# Patient Record
Sex: MALE | Race: BLACK OR AFRICAN AMERICAN | NOT HISPANIC OR LATINO
[De-identification: names, ages, dates, MRNs, and addresses within clinical notes are randomized per-mention and may not be internally consistent; named-entity substitution may affect disease eponyms.]

---

## 2022-01-01 ENCOUNTER — NON-APPOINTMENT (OUTPATIENT)
Age: 72
End: 2022-01-01

## 2022-01-01 ENCOUNTER — TRANSCRIPTION ENCOUNTER (OUTPATIENT)
Age: 72
End: 2022-01-01

## 2022-01-01 ENCOUNTER — APPOINTMENT (OUTPATIENT)
Dept: HEMATOLOGY ONCOLOGY | Facility: CLINIC | Age: 72
End: 2022-01-01

## 2022-01-01 ENCOUNTER — INPATIENT (INPATIENT)
Facility: HOSPITAL | Age: 72
LOS: 1 days | Discharge: HOME | DRG: 812 | End: 2022-11-30
Attending: INTERNAL MEDICINE | Admitting: HOSPITALIST
Payer: COMMERCIAL

## 2022-01-01 ENCOUNTER — RESULT REVIEW (OUTPATIENT)
Age: 72
End: 2022-01-01

## 2022-01-01 ENCOUNTER — APPOINTMENT (OUTPATIENT)
Dept: INFUSION THERAPY | Facility: HOSPITAL | Age: 72
End: 2022-01-01

## 2022-01-01 ENCOUNTER — APPOINTMENT (OUTPATIENT)
Dept: HEMATOLOGY ONCOLOGY | Facility: CLINIC | Age: 72
End: 2022-01-01
Payer: MEDICARE

## 2022-01-01 ENCOUNTER — OUTPATIENT (OUTPATIENT)
Dept: OUTPATIENT SERVICES | Facility: HOSPITAL | Age: 72
LOS: 1 days | End: 2022-01-01
Payer: COMMERCIAL

## 2022-01-01 ENCOUNTER — OUTPATIENT (OUTPATIENT)
Dept: OUTPATIENT SERVICES | Facility: HOSPITAL | Age: 72
LOS: 1 days | Discharge: HOME | End: 2022-01-01

## 2022-01-01 ENCOUNTER — INPATIENT (INPATIENT)
Facility: HOSPITAL | Age: 72
LOS: 5 days | Discharge: HOME | DRG: 846 | End: 2022-12-22
Attending: INTERNAL MEDICINE | Admitting: INTERNAL MEDICINE
Payer: COMMERCIAL

## 2022-01-01 VITALS
OXYGEN SATURATION: 100 % | DIASTOLIC BLOOD PRESSURE: 68 MMHG | SYSTOLIC BLOOD PRESSURE: 124 MMHG | WEIGHT: 181.22 LBS | BODY MASS INDEX: 27.15 KG/M2 | RESPIRATION RATE: 16 BRPM | HEART RATE: 112 BPM | TEMPERATURE: 97.7 F | HEIGHT: 68.5 IN

## 2022-01-01 VITALS
TEMPERATURE: 99 F | RESPIRATION RATE: 17 BRPM | DIASTOLIC BLOOD PRESSURE: 73 MMHG | HEIGHT: 68.11 IN | HEART RATE: 99 BPM | OXYGEN SATURATION: 100 % | SYSTOLIC BLOOD PRESSURE: 121 MMHG | WEIGHT: 181.44 LBS

## 2022-01-01 VITALS
OXYGEN SATURATION: 98 % | TEMPERATURE: 99 F | DIASTOLIC BLOOD PRESSURE: 65 MMHG | SYSTOLIC BLOOD PRESSURE: 102 MMHG | RESPIRATION RATE: 16 BRPM | HEART RATE: 83 BPM

## 2022-01-01 VITALS
OXYGEN SATURATION: 95 % | HEART RATE: 102 BPM | TEMPERATURE: 98.7 F | SYSTOLIC BLOOD PRESSURE: 131 MMHG | DIASTOLIC BLOOD PRESSURE: 84 MMHG

## 2022-01-01 VITALS
SYSTOLIC BLOOD PRESSURE: 118 MMHG | HEART RATE: 92 BPM | TEMPERATURE: 98.3 F | OXYGEN SATURATION: 98 % | RESPIRATION RATE: 18 BRPM | DIASTOLIC BLOOD PRESSURE: 81 MMHG

## 2022-01-01 VITALS
SYSTOLIC BLOOD PRESSURE: 129 MMHG | TEMPERATURE: 98 F | DIASTOLIC BLOOD PRESSURE: 80 MMHG | RESPIRATION RATE: 18 BRPM | HEART RATE: 78 BPM | OXYGEN SATURATION: 99 %

## 2022-01-01 VITALS
DIASTOLIC BLOOD PRESSURE: 82 MMHG | HEART RATE: 90 BPM | OXYGEN SATURATION: 99 % | HEIGHT: 70 IN | TEMPERATURE: 98 F | SYSTOLIC BLOOD PRESSURE: 131 MMHG | WEIGHT: 186.95 LBS | RESPIRATION RATE: 20 BRPM

## 2022-01-01 VITALS
TEMPERATURE: 98.4 F | SYSTOLIC BLOOD PRESSURE: 126 MMHG | HEART RATE: 90 BPM | RESPIRATION RATE: 18 BRPM | DIASTOLIC BLOOD PRESSURE: 81 MMHG | OXYGEN SATURATION: 98 %

## 2022-01-01 DIAGNOSIS — Z98.890 OTHER SPECIFIED POSTPROCEDURAL STATES: Chronic | ICD-10-CM

## 2022-01-01 DIAGNOSIS — E78.5 HYPERLIPIDEMIA, UNSPECIFIED: ICD-10-CM

## 2022-01-01 DIAGNOSIS — D46.9 MYELODYSPLASTIC SYNDROME, UNSPECIFIED: ICD-10-CM

## 2022-01-01 DIAGNOSIS — D61.818 OTHER PANCYTOPENIA: ICD-10-CM

## 2022-01-01 DIAGNOSIS — Z78.9 OTHER SPECIFIED HEALTH STATUS: ICD-10-CM

## 2022-01-01 DIAGNOSIS — Z29.9 ENCOUNTER FOR PROPHYLACTIC MEASURES, UNSPECIFIED: ICD-10-CM

## 2022-01-01 DIAGNOSIS — E11.9 TYPE 2 DIABETES MELLITUS WITHOUT COMPLICATIONS: ICD-10-CM

## 2022-01-01 DIAGNOSIS — R09.89 OTHER SPECIFIED SYMPTOMS AND SIGNS INVOLVING THE CIRCULATORY AND RESPIRATORY SYSTEMS: ICD-10-CM

## 2022-01-01 DIAGNOSIS — Z86.39 PERSONAL HISTORY OF OTHER ENDOCRINE, NUTRITIONAL AND METABOLIC DISEASE: ICD-10-CM

## 2022-01-01 DIAGNOSIS — I10 ESSENTIAL (PRIMARY) HYPERTENSION: ICD-10-CM

## 2022-01-01 DIAGNOSIS — D64.9 ANEMIA, UNSPECIFIED: ICD-10-CM

## 2022-01-01 DIAGNOSIS — R17 UNSPECIFIED JAUNDICE: ICD-10-CM

## 2022-01-01 DIAGNOSIS — Z51.89 ENCOUNTER FOR OTHER SPECIFIED AFTERCARE: ICD-10-CM

## 2022-01-01 DIAGNOSIS — Z82.49 FAMILY HISTORY OF ISCHEMIC HEART DISEASE AND OTHER DISEASES OF THE CIRCULATORY SYSTEM: ICD-10-CM

## 2022-01-01 DIAGNOSIS — R06.09 OTHER FORMS OF DYSPNEA: ICD-10-CM

## 2022-01-01 DIAGNOSIS — B99.9 UNSPECIFIED INFECTIOUS DISEASE: ICD-10-CM

## 2022-01-01 DIAGNOSIS — R11.2 NAUSEA WITH VOMITING, UNSPECIFIED: ICD-10-CM

## 2022-01-01 LAB
A1C WITH ESTIMATED AVERAGE GLUCOSE RESULT: 7.4 % — HIGH (ref 4–5.6)
ALBUMIN SERPL ELPH-MCNC: 3.5 G/DL — SIGNIFICANT CHANGE UP (ref 3.3–5)
ALBUMIN SERPL ELPH-MCNC: 3.7 G/DL — SIGNIFICANT CHANGE UP (ref 3.3–5)
ALBUMIN SERPL ELPH-MCNC: 3.9 G/DL — SIGNIFICANT CHANGE UP (ref 3.3–5)
ALBUMIN SERPL ELPH-MCNC: 4 G/DL — SIGNIFICANT CHANGE UP (ref 3.3–5)
ALBUMIN SERPL ELPH-MCNC: 4.1 G/DL — SIGNIFICANT CHANGE UP (ref 3.3–5)
ALBUMIN SERPL ELPH-MCNC: 4.2 G/DL — SIGNIFICANT CHANGE UP (ref 3.3–5)
ALBUMIN SERPL ELPH-MCNC: 4.3 G/DL
ALBUMIN SERPL ELPH-MCNC: 4.4 G/DL — SIGNIFICANT CHANGE UP (ref 3.3–5)
ALBUMIN SERPL ELPH-MCNC: 4.6 G/DL — SIGNIFICANT CHANGE UP (ref 3.3–5)
ALBUMIN SERPL ELPH-MCNC: 4.9 G/DL — SIGNIFICANT CHANGE UP (ref 3.3–5)
ALP BLD-CCNC: 82 U/L
ALP SERPL-CCNC: 104 U/L — SIGNIFICANT CHANGE UP (ref 40–120)
ALP SERPL-CCNC: 74 U/L — SIGNIFICANT CHANGE UP (ref 40–120)
ALP SERPL-CCNC: 74 U/L — SIGNIFICANT CHANGE UP (ref 40–120)
ALP SERPL-CCNC: 75 U/L — SIGNIFICANT CHANGE UP (ref 40–120)
ALP SERPL-CCNC: 75 U/L — SIGNIFICANT CHANGE UP (ref 40–120)
ALP SERPL-CCNC: 76 U/L — SIGNIFICANT CHANGE UP (ref 40–120)
ALP SERPL-CCNC: 80 U/L — SIGNIFICANT CHANGE UP (ref 40–120)
ALP SERPL-CCNC: 80 U/L — SIGNIFICANT CHANGE UP (ref 40–120)
ALP SERPL-CCNC: 86 U/L — SIGNIFICANT CHANGE UP (ref 40–120)
ALP SERPL-CCNC: 90 U/L — SIGNIFICANT CHANGE UP (ref 40–120)
ALP SERPL-CCNC: 95 U/L — SIGNIFICANT CHANGE UP (ref 40–120)
ALT FLD-CCNC: 23 U/L — SIGNIFICANT CHANGE UP (ref 10–45)
ALT FLD-CCNC: 27 U/L — SIGNIFICANT CHANGE UP (ref 10–45)
ALT FLD-CCNC: 30 U/L — SIGNIFICANT CHANGE UP (ref 10–45)
ALT FLD-CCNC: 34 U/L — SIGNIFICANT CHANGE UP (ref 10–45)
ALT FLD-CCNC: 36 U/L — SIGNIFICANT CHANGE UP (ref 10–45)
ALT FLD-CCNC: 38 U/L — SIGNIFICANT CHANGE UP (ref 10–45)
ALT FLD-CCNC: 39 U/L — SIGNIFICANT CHANGE UP (ref 10–45)
ALT FLD-CCNC: 40 U/L — SIGNIFICANT CHANGE UP (ref 10–45)
ALT FLD-CCNC: 40 U/L — SIGNIFICANT CHANGE UP (ref 10–45)
ALT FLD-CCNC: 42 U/L — SIGNIFICANT CHANGE UP (ref 10–45)
ALT FLD-CCNC: 43 U/L — SIGNIFICANT CHANGE UP (ref 10–45)
ALT SERPL-CCNC: 32 U/L
ANION GAP SERPL CALC-SCNC: 10 MMOL/L — SIGNIFICANT CHANGE UP (ref 5–17)
ANION GAP SERPL CALC-SCNC: 11 MMOL/L — SIGNIFICANT CHANGE UP (ref 5–17)
ANION GAP SERPL CALC-SCNC: 11 MMOL/L — SIGNIFICANT CHANGE UP (ref 5–17)
ANION GAP SERPL CALC-SCNC: 12 MMOL/L — SIGNIFICANT CHANGE UP (ref 5–17)
ANION GAP SERPL CALC-SCNC: 14 MMOL/L
ANION GAP SERPL CALC-SCNC: 14 MMOL/L — SIGNIFICANT CHANGE UP (ref 5–17)
ANION GAP SERPL CALC-SCNC: 15 MMOL/L — SIGNIFICANT CHANGE UP (ref 5–17)
ANION GAP SERPL CALC-SCNC: 7 MMOL/L — SIGNIFICANT CHANGE UP (ref 5–17)
ANION GAP SERPL CALC-SCNC: 8 MMOL/L — SIGNIFICANT CHANGE UP (ref 5–17)
ANION GAP SERPL CALC-SCNC: 9 MMOL/L — SIGNIFICANT CHANGE UP (ref 5–17)
ANISOCYTOSIS BLD QL: SLIGHT — SIGNIFICANT CHANGE UP
APPEARANCE UR: CLEAR — SIGNIFICANT CHANGE UP
APTT BLD: 26.4 SEC — LOW (ref 27.5–35.5)
APTT BLD: 29.2 SEC — SIGNIFICANT CHANGE UP (ref 27.5–35.5)
AST SERPL-CCNC: 17 U/L — SIGNIFICANT CHANGE UP (ref 10–40)
AST SERPL-CCNC: 20 U/L — SIGNIFICANT CHANGE UP (ref 10–40)
AST SERPL-CCNC: 22 U/L — SIGNIFICANT CHANGE UP (ref 10–40)
AST SERPL-CCNC: 25 U/L — SIGNIFICANT CHANGE UP (ref 10–40)
AST SERPL-CCNC: 26 U/L — SIGNIFICANT CHANGE UP (ref 10–40)
AST SERPL-CCNC: 26 U/L — SIGNIFICANT CHANGE UP (ref 10–40)
AST SERPL-CCNC: 27 U/L
AST SERPL-CCNC: 27 U/L — SIGNIFICANT CHANGE UP (ref 10–40)
AST SERPL-CCNC: 27 U/L — SIGNIFICANT CHANGE UP (ref 10–40)
AST SERPL-CCNC: 29 U/L — SIGNIFICANT CHANGE UP (ref 10–40)
BACTERIA # UR AUTO: NEGATIVE — SIGNIFICANT CHANGE UP
BASOPHILS # BLD AUTO: 0 K/UL — SIGNIFICANT CHANGE UP (ref 0–0.2)
BASOPHILS # BLD AUTO: 0.01 K/UL — SIGNIFICANT CHANGE UP (ref 0–0.2)
BASOPHILS NFR BLD AUTO: 0 % — SIGNIFICANT CHANGE UP (ref 0–2)
BASOPHILS NFR BLD AUTO: 0.2 % — SIGNIFICANT CHANGE UP (ref 0–2)
BILIRUB DIRECT SERPL-MCNC: 0.3 MG/DL — SIGNIFICANT CHANGE UP (ref 0–0.3)
BILIRUB SERPL-MCNC: 0.6 MG/DL — SIGNIFICANT CHANGE UP (ref 0.2–1.2)
BILIRUB SERPL-MCNC: 0.8 MG/DL — SIGNIFICANT CHANGE UP (ref 0.2–1.2)
BILIRUB SERPL-MCNC: 0.8 MG/DL — SIGNIFICANT CHANGE UP (ref 0.2–1.2)
BILIRUB SERPL-MCNC: 0.9 MG/DL — SIGNIFICANT CHANGE UP (ref 0.2–1.2)
BILIRUB SERPL-MCNC: 1.1 MG/DL — SIGNIFICANT CHANGE UP (ref 0.2–1.2)
BILIRUB SERPL-MCNC: 1.1 MG/DL — SIGNIFICANT CHANGE UP (ref 0.2–1.2)
BILIRUB SERPL-MCNC: 1.2 MG/DL
BILIRUB SERPL-MCNC: 1.3 MG/DL — HIGH (ref 0.2–1.2)
BILIRUB SERPL-MCNC: 1.5 MG/DL — HIGH (ref 0.2–1.2)
BILIRUB UR-MCNC: NEGATIVE — SIGNIFICANT CHANGE UP
BLASTS # FLD: 1 % — HIGH (ref 0–0)
BLD GP AB SCN SERPL QL: NEGATIVE — SIGNIFICANT CHANGE UP
BLD GP AB SCN SERPL QL: POSITIVE — SIGNIFICANT CHANGE UP
BLD GP AB SCN SERPL QL: POSITIVE — SIGNIFICANT CHANGE UP
BUN SERPL-MCNC: 13 MG/DL — SIGNIFICANT CHANGE UP (ref 7–23)
BUN SERPL-MCNC: 13 MG/DL — SIGNIFICANT CHANGE UP (ref 7–23)
BUN SERPL-MCNC: 14 MG/DL — SIGNIFICANT CHANGE UP (ref 7–23)
BUN SERPL-MCNC: 15 MG/DL
BUN SERPL-MCNC: 15 MG/DL — SIGNIFICANT CHANGE UP (ref 7–23)
BUN SERPL-MCNC: 16 MG/DL — SIGNIFICANT CHANGE UP (ref 7–23)
BUN SERPL-MCNC: 17 MG/DL — SIGNIFICANT CHANGE UP (ref 7–23)
CALCIUM SERPL-MCNC: 10 MG/DL — SIGNIFICANT CHANGE UP (ref 8.4–10.5)
CALCIUM SERPL-MCNC: 9 MG/DL — SIGNIFICANT CHANGE UP (ref 8.4–10.5)
CALCIUM SERPL-MCNC: 9.2 MG/DL
CALCIUM SERPL-MCNC: 9.2 MG/DL — SIGNIFICANT CHANGE UP (ref 8.4–10.5)
CALCIUM SERPL-MCNC: 9.3 MG/DL — SIGNIFICANT CHANGE UP (ref 8.4–10.5)
CALCIUM SERPL-MCNC: 9.3 MG/DL — SIGNIFICANT CHANGE UP (ref 8.4–10.5)
CALCIUM SERPL-MCNC: 9.5 MG/DL — SIGNIFICANT CHANGE UP (ref 8.4–10.5)
CALCIUM SERPL-MCNC: 9.5 MG/DL — SIGNIFICANT CHANGE UP (ref 8.4–10.5)
CALCIUM SERPL-MCNC: 9.6 MG/DL — SIGNIFICANT CHANGE UP (ref 8.4–10.5)
CALCIUM SERPL-MCNC: 9.8 MG/DL — SIGNIFICANT CHANGE UP (ref 8.4–10.5)
CHLORIDE SERPL-SCNC: 100 MMOL/L — SIGNIFICANT CHANGE UP (ref 96–108)
CHLORIDE SERPL-SCNC: 101 MMOL/L — SIGNIFICANT CHANGE UP (ref 96–108)
CHLORIDE SERPL-SCNC: 103 MMOL/L — SIGNIFICANT CHANGE UP (ref 96–108)
CHLORIDE SERPL-SCNC: 104 MMOL/L — SIGNIFICANT CHANGE UP (ref 96–108)
CHLORIDE SERPL-SCNC: 105 MMOL/L — SIGNIFICANT CHANGE UP (ref 96–108)
CHLORIDE SERPL-SCNC: 105 MMOL/L — SIGNIFICANT CHANGE UP (ref 96–108)
CHLORIDE SERPL-SCNC: 98 MMOL/L
CHLORIDE SERPL-SCNC: 98 MMOL/L — SIGNIFICANT CHANGE UP (ref 96–108)
CHLORIDE SERPL-SCNC: 98 MMOL/L — SIGNIFICANT CHANGE UP (ref 96–108)
CHLORIDE SERPL-SCNC: 99 MMOL/L — SIGNIFICANT CHANGE UP (ref 96–108)
CHROM ANALY INTERPHASE BLD FISH-IMP: SIGNIFICANT CHANGE UP
CHROM ANALY OVERALL INTERP SPEC-IMP: SIGNIFICANT CHANGE UP
CO2 SERPL-SCNC: 24 MMOL/L — SIGNIFICANT CHANGE UP (ref 22–31)
CO2 SERPL-SCNC: 24 MMOL/L — SIGNIFICANT CHANGE UP (ref 22–31)
CO2 SERPL-SCNC: 25 MMOL/L — SIGNIFICANT CHANGE UP (ref 22–31)
CO2 SERPL-SCNC: 26 MMOL/L
CO2 SERPL-SCNC: 26 MMOL/L — SIGNIFICANT CHANGE UP (ref 22–31)
CO2 SERPL-SCNC: 26 MMOL/L — SIGNIFICANT CHANGE UP (ref 22–31)
CO2 SERPL-SCNC: 27 MMOL/L — SIGNIFICANT CHANGE UP (ref 22–31)
COLOR SPEC: YELLOW — SIGNIFICANT CHANGE UP
CREAT SERPL-MCNC: 0.94 MG/DL — SIGNIFICANT CHANGE UP (ref 0.5–1.3)
CREAT SERPL-MCNC: 0.97 MG/DL — SIGNIFICANT CHANGE UP (ref 0.5–1.3)
CREAT SERPL-MCNC: 1.05 MG/DL — SIGNIFICANT CHANGE UP (ref 0.5–1.3)
CREAT SERPL-MCNC: 1.08 MG/DL — SIGNIFICANT CHANGE UP (ref 0.5–1.3)
CREAT SERPL-MCNC: 1.09 MG/DL — SIGNIFICANT CHANGE UP (ref 0.5–1.3)
CREAT SERPL-MCNC: 1.13 MG/DL — SIGNIFICANT CHANGE UP (ref 0.5–1.3)
CREAT SERPL-MCNC: 1.15 MG/DL
CREAT SERPL-MCNC: 1.16 MG/DL — SIGNIFICANT CHANGE UP (ref 0.5–1.3)
CREAT SERPL-MCNC: 1.17 MG/DL — SIGNIFICANT CHANGE UP (ref 0.5–1.3)
CREAT SERPL-MCNC: 1.21 MG/DL — SIGNIFICANT CHANGE UP (ref 0.5–1.3)
CREAT SERPL-MCNC: 1.21 MG/DL — SIGNIFICANT CHANGE UP (ref 0.5–1.3)
CREAT SERPL-MCNC: 1.27 MG/DL — SIGNIFICANT CHANGE UP (ref 0.5–1.3)
DACRYOCYTES BLD QL SMEAR: SLIGHT — SIGNIFICANT CHANGE UP
DIFF PNL FLD: NEGATIVE — SIGNIFICANT CHANGE UP
EGFR: 60 ML/MIN/1.73M2 — SIGNIFICANT CHANGE UP
EGFR: 64 ML/MIN/1.73M2 — SIGNIFICANT CHANGE UP
EGFR: 64 ML/MIN/1.73M2 — SIGNIFICANT CHANGE UP
EGFR: 66 ML/MIN/1.73M2 — SIGNIFICANT CHANGE UP
EGFR: 67 ML/MIN/1.73M2 — SIGNIFICANT CHANGE UP
EGFR: 68 ML/MIN/1.73M2
EGFR: 69 ML/MIN/1.73M2 — SIGNIFICANT CHANGE UP
EGFR: 72 ML/MIN/1.73M2 — SIGNIFICANT CHANGE UP
EGFR: 73 ML/MIN/1.73M2 — SIGNIFICANT CHANGE UP
EGFR: 75 ML/MIN/1.73M2 — SIGNIFICANT CHANGE UP
EGFR: 83 ML/MIN/1.73M2 — SIGNIFICANT CHANGE UP
EGFR: 86 ML/MIN/1.73M2 — SIGNIFICANT CHANGE UP
ELLIPTOCYTES BLD QL SMEAR: SLIGHT — SIGNIFICANT CHANGE UP
EOSINOPHIL # BLD AUTO: 0 K/UL — SIGNIFICANT CHANGE UP (ref 0–0.5)
EOSINOPHIL # BLD AUTO: 0.01 K/UL — SIGNIFICANT CHANGE UP (ref 0–0.5)
EOSINOPHIL # BLD AUTO: 0.02 K/UL — SIGNIFICANT CHANGE UP (ref 0–0.5)
EOSINOPHIL # BLD AUTO: 0.03 K/UL — SIGNIFICANT CHANGE UP (ref 0–0.5)
EOSINOPHIL # BLD AUTO: 0.04 K/UL — SIGNIFICANT CHANGE UP (ref 0–0.5)
EOSINOPHIL # BLD AUTO: 0.1 K/UL — SIGNIFICANT CHANGE UP (ref 0–0.5)
EOSINOPHIL NFR BLD AUTO: 0 % — SIGNIFICANT CHANGE UP (ref 0–6)
EOSINOPHIL NFR BLD AUTO: 0.2 % — SIGNIFICANT CHANGE UP (ref 0–6)
EOSINOPHIL NFR BLD AUTO: 0.9 % — SIGNIFICANT CHANGE UP (ref 0–6)
EOSINOPHIL NFR BLD AUTO: 1 % — SIGNIFICANT CHANGE UP (ref 0–6)
EOSINOPHIL NFR BLD AUTO: 1 % — SIGNIFICANT CHANGE UP (ref 0–6)
EOSINOPHIL NFR BLD AUTO: 2 % — SIGNIFICANT CHANGE UP (ref 0–6)
EPI CELLS # UR: 0 /HPF — SIGNIFICANT CHANGE UP
ESTIMATED AVERAGE GLUCOSE: 166 MG/DL — HIGH (ref 68–114)
FERRITIN SERPL-MCNC: 1218 NG/ML — HIGH (ref 30–400)
FLUAV AG NPH QL: SIGNIFICANT CHANGE UP
FLUBV AG NPH QL: SIGNIFICANT CHANGE UP
FOLATE SERPL-MCNC: >20 NG/ML — SIGNIFICANT CHANGE UP
FOLATE SERPL-MCNC: >20 NG/ML — SIGNIFICANT CHANGE UP
G6PD RBC-CCNC: 8.9 U/G HGB — SIGNIFICANT CHANGE UP (ref 7–20.5)
GLUCOSE BLDC GLUCOMTR-MCNC: 106 MG/DL — HIGH (ref 70–99)
GLUCOSE BLDC GLUCOMTR-MCNC: 115 MG/DL — HIGH (ref 70–99)
GLUCOSE BLDC GLUCOMTR-MCNC: 119 MG/DL — HIGH (ref 70–99)
GLUCOSE BLDC GLUCOMTR-MCNC: 124 MG/DL — HIGH (ref 70–99)
GLUCOSE BLDC GLUCOMTR-MCNC: 129 MG/DL — HIGH (ref 70–99)
GLUCOSE BLDC GLUCOMTR-MCNC: 130 MG/DL — HIGH (ref 70–99)
GLUCOSE BLDC GLUCOMTR-MCNC: 133 MG/DL — HIGH (ref 70–99)
GLUCOSE BLDC GLUCOMTR-MCNC: 143 MG/DL — HIGH (ref 70–99)
GLUCOSE BLDC GLUCOMTR-MCNC: 144 MG/DL — HIGH (ref 70–99)
GLUCOSE BLDC GLUCOMTR-MCNC: 145 MG/DL — HIGH (ref 70–99)
GLUCOSE BLDC GLUCOMTR-MCNC: 150 MG/DL — HIGH (ref 70–99)
GLUCOSE BLDC GLUCOMTR-MCNC: 152 MG/DL — HIGH (ref 70–99)
GLUCOSE BLDC GLUCOMTR-MCNC: 154 MG/DL — HIGH (ref 70–99)
GLUCOSE BLDC GLUCOMTR-MCNC: 154 MG/DL — HIGH (ref 70–99)
GLUCOSE BLDC GLUCOMTR-MCNC: 155 MG/DL — HIGH (ref 70–99)
GLUCOSE BLDC GLUCOMTR-MCNC: 159 MG/DL — HIGH (ref 70–99)
GLUCOSE BLDC GLUCOMTR-MCNC: 163 MG/DL — HIGH (ref 70–99)
GLUCOSE BLDC GLUCOMTR-MCNC: 173 MG/DL — HIGH (ref 70–99)
GLUCOSE BLDC GLUCOMTR-MCNC: 175 MG/DL — HIGH (ref 70–99)
GLUCOSE BLDC GLUCOMTR-MCNC: 176 MG/DL — HIGH (ref 70–99)
GLUCOSE BLDC GLUCOMTR-MCNC: 179 MG/DL — HIGH (ref 70–99)
GLUCOSE BLDC GLUCOMTR-MCNC: 187 MG/DL — HIGH (ref 70–99)
GLUCOSE BLDC GLUCOMTR-MCNC: 190 MG/DL — HIGH (ref 70–99)
GLUCOSE BLDC GLUCOMTR-MCNC: 191 MG/DL — HIGH (ref 70–99)
GLUCOSE BLDC GLUCOMTR-MCNC: 196 MG/DL — HIGH (ref 70–99)
GLUCOSE BLDC GLUCOMTR-MCNC: 239 MG/DL — HIGH (ref 70–99)
GLUCOSE BLDC GLUCOMTR-MCNC: 258 MG/DL — HIGH (ref 70–99)
GLUCOSE SERPL-MCNC: 104 MG/DL — HIGH (ref 70–99)
GLUCOSE SERPL-MCNC: 118 MG/DL — HIGH (ref 70–99)
GLUCOSE SERPL-MCNC: 119 MG/DL — HIGH (ref 70–99)
GLUCOSE SERPL-MCNC: 127 MG/DL — HIGH (ref 70–99)
GLUCOSE SERPL-MCNC: 146 MG/DL — HIGH (ref 70–99)
GLUCOSE SERPL-MCNC: 149 MG/DL — HIGH (ref 70–99)
GLUCOSE SERPL-MCNC: 151 MG/DL — HIGH (ref 70–99)
GLUCOSE SERPL-MCNC: 151 MG/DL — HIGH (ref 70–99)
GLUCOSE SERPL-MCNC: 162 MG/DL — HIGH (ref 70–99)
GLUCOSE SERPL-MCNC: 168 MG/DL — HIGH (ref 70–99)
GLUCOSE SERPL-MCNC: 205 MG/DL
GLUCOSE SERPL-MCNC: 261 MG/DL — HIGH (ref 70–99)
GLUCOSE UR QL: NEGATIVE — SIGNIFICANT CHANGE UP
HAPTOGLOB SERPL-MCNC: 109 MG/DL — SIGNIFICANT CHANGE UP (ref 34–200)
HAPTOGLOB SERPL-MCNC: 109 MG/DL — SIGNIFICANT CHANGE UP (ref 34–200)
HAV IGM SER-ACNC: SIGNIFICANT CHANGE UP
HBV CORE AB SER-ACNC: SIGNIFICANT CHANGE UP
HBV CORE IGM SER-ACNC: SIGNIFICANT CHANGE UP
HBV SURFACE AB SER-ACNC: <3 MIU/ML — LOW
HBV SURFACE AG SER-ACNC: SIGNIFICANT CHANGE UP
HCT VFR BLD CALC: 20.4 % — CRITICAL LOW (ref 39–50)
HCT VFR BLD CALC: 21 % — CRITICAL LOW (ref 39–50)
HCT VFR BLD CALC: 21.1 % — LOW (ref 39–50)
HCT VFR BLD CALC: 22.2 % — LOW (ref 39–50)
HCT VFR BLD CALC: 22.8 % — LOW (ref 39–50)
HCT VFR BLD CALC: 23.1 % — LOW (ref 39–50)
HCT VFR BLD CALC: 23.4 % — LOW (ref 39–50)
HCT VFR BLD CALC: 23.5 % — LOW (ref 39–50)
HCT VFR BLD CALC: 23.6 % — LOW (ref 39–50)
HCT VFR BLD CALC: 24.1 % — LOW (ref 39–50)
HCT VFR BLD CALC: 24.7 % — LOW (ref 39–50)
HCT VFR BLD CALC: 25.6 % — LOW (ref 39–50)
HCT VFR BLD CALC: 26.3 % — LOW (ref 39–50)
HCT VFR BLD CALC: 27.2 % — LOW (ref 39–50)
HCT VFR BLD CALC: 30.1 % — LOW (ref 39–50)
HCT VFR BLD CALC: 31.6 % — LOW (ref 39–50)
HCV AB S/CO SERPL IA: 0.1 S/CO — SIGNIFICANT CHANGE UP (ref 0–0.99)
HCV AB S/CO SERPL IA: 0.11 S/CO — SIGNIFICANT CHANGE UP (ref 0–0.99)
HCV AB SERPL-IMP: SIGNIFICANT CHANGE UP
HCV AB SERPL-IMP: SIGNIFICANT CHANGE UP
HEMATOPATHOLOGY REPORT: SIGNIFICANT CHANGE UP
HGB BLD-MCNC: 10.4 G/DL — LOW (ref 13–17)
HGB BLD-MCNC: 6.7 G/DL — CRITICAL LOW (ref 13–17)
HGB BLD-MCNC: 6.8 G/DL — CRITICAL LOW (ref 13–17)
HGB BLD-MCNC: 6.8 G/DL — CRITICAL LOW (ref 13–17)
HGB BLD-MCNC: 7.1 G/DL — LOW (ref 13–17)
HGB BLD-MCNC: 7.4 G/DL — LOW (ref 13–17)
HGB BLD-MCNC: 7.6 G/DL — LOW (ref 13–17)
HGB BLD-MCNC: 7.7 G/DL — LOW (ref 13–17)
HGB BLD-MCNC: 7.9 G/DL — LOW (ref 13–17)
HGB BLD-MCNC: 7.9 G/DL — LOW (ref 13–17)
HGB BLD-MCNC: 8.1 G/DL — LOW (ref 13–17)
HGB BLD-MCNC: 8.3 G/DL — LOW (ref 13–17)
HGB BLD-MCNC: 8.3 G/DL — LOW (ref 13–17)
HGB BLD-MCNC: 8.7 G/DL — LOW (ref 13–17)
HGB BLD-MCNC: 9.4 G/DL — LOW (ref 13–17)
HGB BLD-MCNC: 9.9 G/DL — LOW (ref 13–17)
HIV 1+2 AB+HIV1 P24 AG SERPL QL IA: SIGNIFICANT CHANGE UP
HYALINE CASTS # UR AUTO: 1 /LPF — SIGNIFICANT CHANGE UP (ref 0–2)
HYPOCHROMIA BLD QL: SLIGHT — SIGNIFICANT CHANGE UP
IMM GRANULOCYTES NFR BLD AUTO: 1.6 % — HIGH (ref 0–0.9)
INR BLD: 1.23 RATIO — HIGH (ref 0.88–1.16)
INR BLD: 1.26 RATIO — HIGH (ref 0.88–1.16)
IRON SATN MFR SERPL: 149 UG/DL — SIGNIFICANT CHANGE UP (ref 45–165)
IRON SATN MFR SERPL: 46 % — SIGNIFICANT CHANGE UP (ref 16–55)
KETONES UR-MCNC: NEGATIVE — SIGNIFICANT CHANGE UP
LDH SERPL L TO P-CCNC: 177 U/L — SIGNIFICANT CHANGE UP (ref 50–242)
LDH SERPL L TO P-CCNC: 327 U/L — HIGH (ref 50–242)
LDH SERPL L TO P-CCNC: 348 U/L — HIGH (ref 50–242)
LDH SERPL L TO P-CCNC: 365 U/L — HIGH (ref 50–242)
LDH SERPL L TO P-CCNC: 368 U/L — HIGH (ref 50–242)
LDH SERPL L TO P-CCNC: 392 U/L — HIGH (ref 50–242)
LDH SERPL L TO P-CCNC: 421 U/L — HIGH (ref 50–242)
LDH SERPL-CCNC: 321 U/L
LEUKOCYTE ESTERASE UR-ACNC: NEGATIVE — SIGNIFICANT CHANGE UP
LYMPHOCYTES # BLD AUTO: 0.4 K/UL — LOW (ref 1–3.3)
LYMPHOCYTES # BLD AUTO: 0.41 K/UL — LOW (ref 1–3.3)
LYMPHOCYTES # BLD AUTO: 0.43 K/UL — LOW (ref 1–3.3)
LYMPHOCYTES # BLD AUTO: 0.47 K/UL — LOW (ref 1–3.3)
LYMPHOCYTES # BLD AUTO: 0.51 K/UL — LOW (ref 1–3.3)
LYMPHOCYTES # BLD AUTO: 0.57 K/UL — LOW (ref 1–3.3)
LYMPHOCYTES # BLD AUTO: 0.58 K/UL — LOW (ref 1–3.3)
LYMPHOCYTES # BLD AUTO: 0.64 K/UL — LOW (ref 1–3.3)
LYMPHOCYTES # BLD AUTO: 0.66 K/UL — LOW (ref 1–3.3)
LYMPHOCYTES # BLD AUTO: 0.66 K/UL — LOW (ref 1–3.3)
LYMPHOCYTES # BLD AUTO: 0.68 K/UL — LOW (ref 1–3.3)
LYMPHOCYTES # BLD AUTO: 0.71 K/UL — LOW (ref 1–3.3)
LYMPHOCYTES # BLD AUTO: 0.72 K/UL — LOW (ref 1–3.3)
LYMPHOCYTES # BLD AUTO: 0.73 K/UL — LOW (ref 1–3.3)
LYMPHOCYTES # BLD AUTO: 0.94 K/UL — LOW (ref 1–3.3)
LYMPHOCYTES # BLD AUTO: 11 % — LOW (ref 13–44)
LYMPHOCYTES # BLD AUTO: 11.2 % — LOW (ref 13–44)
LYMPHOCYTES # BLD AUTO: 12.5 % — LOW (ref 13–44)
LYMPHOCYTES # BLD AUTO: 12.5 % — LOW (ref 13–44)
LYMPHOCYTES # BLD AUTO: 13.4 % — SIGNIFICANT CHANGE UP (ref 13–44)
LYMPHOCYTES # BLD AUTO: 14 % — SIGNIFICANT CHANGE UP (ref 13–44)
LYMPHOCYTES # BLD AUTO: 14.8 % — SIGNIFICANT CHANGE UP (ref 13–44)
LYMPHOCYTES # BLD AUTO: 15.5 % — SIGNIFICANT CHANGE UP (ref 13–44)
LYMPHOCYTES # BLD AUTO: 16.7 % — SIGNIFICANT CHANGE UP (ref 13–44)
LYMPHOCYTES # BLD AUTO: 17 % — SIGNIFICANT CHANGE UP (ref 13–44)
LYMPHOCYTES # BLD AUTO: 17.4 % — SIGNIFICANT CHANGE UP (ref 13–44)
LYMPHOCYTES # BLD AUTO: 20 % — SIGNIFICANT CHANGE UP (ref 13–44)
LYMPHOCYTES # BLD AUTO: 23.7 % — SIGNIFICANT CHANGE UP (ref 13–44)
LYMPHOCYTES # BLD AUTO: 24 % — SIGNIFICANT CHANGE UP (ref 13–44)
LYMPHOCYTES # BLD AUTO: 28 % — SIGNIFICANT CHANGE UP (ref 13–44)
MAGNESIUM SERPL-MCNC: 1.4 MG/DL — LOW (ref 1.6–2.6)
MAGNESIUM SERPL-MCNC: 1.5 MG/DL — LOW (ref 1.6–2.6)
MAGNESIUM SERPL-MCNC: 1.6 MG/DL — SIGNIFICANT CHANGE UP (ref 1.6–2.6)
MAGNESIUM SERPL-MCNC: 1.7 MG/DL — SIGNIFICANT CHANGE UP (ref 1.6–2.6)
MAGNESIUM SERPL-MCNC: 1.7 MG/DL — SIGNIFICANT CHANGE UP (ref 1.6–2.6)
MAGNESIUM SERPL-MCNC: 1.8 MG/DL — SIGNIFICANT CHANGE UP (ref 1.6–2.6)
MANUAL SMEAR VERIFICATION: SIGNIFICANT CHANGE UP
MCHC RBC-ENTMCNC: 28.3 PG — SIGNIFICANT CHANGE UP (ref 27–34)
MCHC RBC-ENTMCNC: 28.5 PG — SIGNIFICANT CHANGE UP (ref 27–34)
MCHC RBC-ENTMCNC: 28.6 PG — SIGNIFICANT CHANGE UP (ref 27–34)
MCHC RBC-ENTMCNC: 28.8 PG — SIGNIFICANT CHANGE UP (ref 27–34)
MCHC RBC-ENTMCNC: 28.8 PG — SIGNIFICANT CHANGE UP (ref 27–34)
MCHC RBC-ENTMCNC: 28.9 PG — SIGNIFICANT CHANGE UP (ref 27–34)
MCHC RBC-ENTMCNC: 28.9 PG — SIGNIFICANT CHANGE UP (ref 27–34)
MCHC RBC-ENTMCNC: 29 PG — SIGNIFICANT CHANGE UP (ref 27–34)
MCHC RBC-ENTMCNC: 29 PG — SIGNIFICANT CHANGE UP (ref 27–34)
MCHC RBC-ENTMCNC: 29.2 PG — SIGNIFICANT CHANGE UP (ref 27–34)
MCHC RBC-ENTMCNC: 29.2 PG — SIGNIFICANT CHANGE UP (ref 27–34)
MCHC RBC-ENTMCNC: 29.4 PG — SIGNIFICANT CHANGE UP (ref 27–34)
MCHC RBC-ENTMCNC: 29.8 PG — SIGNIFICANT CHANGE UP (ref 27–34)
MCHC RBC-ENTMCNC: 32 GM/DL — SIGNIFICANT CHANGE UP (ref 32–36)
MCHC RBC-ENTMCNC: 32.2 GM/DL — SIGNIFICANT CHANGE UP (ref 32–36)
MCHC RBC-ENTMCNC: 32.4 G/DL — SIGNIFICANT CHANGE UP (ref 32–36)
MCHC RBC-ENTMCNC: 32.4 GM/DL — SIGNIFICANT CHANGE UP (ref 32–36)
MCHC RBC-ENTMCNC: 32.5 GM/DL — SIGNIFICANT CHANGE UP (ref 32–36)
MCHC RBC-ENTMCNC: 32.6 GM/DL — SIGNIFICANT CHANGE UP (ref 32–36)
MCHC RBC-ENTMCNC: 32.8 GM/DL — SIGNIFICANT CHANGE UP (ref 32–36)
MCHC RBC-ENTMCNC: 32.9 G/DL — SIGNIFICANT CHANGE UP (ref 32–36)
MCHC RBC-ENTMCNC: 32.9 GM/DL — SIGNIFICANT CHANGE UP (ref 32–36)
MCHC RBC-ENTMCNC: 33 G/DL — SIGNIFICANT CHANGE UP (ref 32–36)
MCHC RBC-ENTMCNC: 33.1 GM/DL — SIGNIFICANT CHANGE UP (ref 32–36)
MCHC RBC-ENTMCNC: 33.6 G/DL — SIGNIFICANT CHANGE UP (ref 32–36)
MCHC RBC-ENTMCNC: 33.6 GM/DL — SIGNIFICANT CHANGE UP (ref 32–36)
MCHC RBC-ENTMCNC: 33.6 GM/DL — SIGNIFICANT CHANGE UP (ref 32–36)
MCHC RBC-ENTMCNC: 33.8 G/DL — SIGNIFICANT CHANGE UP (ref 32–36)
MCHC RBC-ENTMCNC: 34.6 G/DL — SIGNIFICANT CHANGE UP (ref 32–36)
MCV RBC AUTO: 82.4 FL — SIGNIFICANT CHANGE UP (ref 80–100)
MCV RBC AUTO: 84.9 FL — SIGNIFICANT CHANGE UP (ref 80–100)
MCV RBC AUTO: 86 FL — SIGNIFICANT CHANGE UP (ref 80–100)
MCV RBC AUTO: 86.4 FL — SIGNIFICANT CHANGE UP (ref 80–100)
MCV RBC AUTO: 86.7 FL — SIGNIFICANT CHANGE UP (ref 80–100)
MCV RBC AUTO: 87.3 FL — SIGNIFICANT CHANGE UP (ref 80–100)
MCV RBC AUTO: 87.9 FL — SIGNIFICANT CHANGE UP (ref 80–100)
MCV RBC AUTO: 87.9 FL — SIGNIFICANT CHANGE UP (ref 80–100)
MCV RBC AUTO: 88.3 FL — SIGNIFICANT CHANGE UP (ref 80–100)
MCV RBC AUTO: 88.4 FL — SIGNIFICANT CHANGE UP (ref 80–100)
MCV RBC AUTO: 88.7 FL — SIGNIFICANT CHANGE UP (ref 80–100)
MCV RBC AUTO: 88.8 FL — SIGNIFICANT CHANGE UP (ref 80–100)
MCV RBC AUTO: 88.9 FL — SIGNIFICANT CHANGE UP (ref 80–100)
MCV RBC AUTO: 89.1 FL — SIGNIFICANT CHANGE UP (ref 80–100)
MCV RBC AUTO: 89.4 FL — SIGNIFICANT CHANGE UP (ref 80–100)
MCV RBC AUTO: 89.4 FL — SIGNIFICANT CHANGE UP (ref 80–100)
METAMYELOCYTES # FLD: 0.9 % — HIGH (ref 0–0)
METAMYELOCYTES # FLD: 1 % — HIGH (ref 0–0)
METAMYELOCYTES # FLD: 1.7 % — HIGH (ref 0–0)
METAMYELOCYTES # FLD: 1.8 % — HIGH (ref 0–0)
METAMYELOCYTES # FLD: 2.5 % — HIGH (ref 0–0)
METAMYELOCYTES # FLD: 2.8 % — HIGH (ref 0–0)
MISCELLANEOUS TEST NAME: SIGNIFICANT CHANGE UP
MONOCYTES # BLD AUTO: 0 K/UL — SIGNIFICANT CHANGE UP (ref 0–0.9)
MONOCYTES # BLD AUTO: 0 K/UL — SIGNIFICANT CHANGE UP (ref 0–0.9)
MONOCYTES # BLD AUTO: 0.04 K/UL — SIGNIFICANT CHANGE UP (ref 0–0.9)
MONOCYTES # BLD AUTO: 0.08 K/UL — SIGNIFICANT CHANGE UP (ref 0–0.9)
MONOCYTES # BLD AUTO: 0.1 K/UL — SIGNIFICANT CHANGE UP (ref 0–0.9)
MONOCYTES # BLD AUTO: 0.12 K/UL — SIGNIFICANT CHANGE UP (ref 0–0.9)
MONOCYTES # BLD AUTO: 0.13 K/UL — SIGNIFICANT CHANGE UP (ref 0–0.9)
MONOCYTES # BLD AUTO: 0.13 K/UL — SIGNIFICANT CHANGE UP (ref 0–0.9)
MONOCYTES # BLD AUTO: 0.14 K/UL — SIGNIFICANT CHANGE UP (ref 0–0.9)
MONOCYTES # BLD AUTO: 0.17 K/UL — SIGNIFICANT CHANGE UP (ref 0–0.9)
MONOCYTES # BLD AUTO: 0.18 K/UL — SIGNIFICANT CHANGE UP (ref 0–0.9)
MONOCYTES # BLD AUTO: 0.19 K/UL — SIGNIFICANT CHANGE UP (ref 0–0.9)
MONOCYTES # BLD AUTO: 0.19 K/UL — SIGNIFICANT CHANGE UP (ref 0–0.9)
MONOCYTES # BLD AUTO: 0.21 K/UL — SIGNIFICANT CHANGE UP (ref 0–0.9)
MONOCYTES # BLD AUTO: 0.24 K/UL — SIGNIFICANT CHANGE UP (ref 0–0.9)
MONOCYTES NFR BLD AUTO: 0 % — LOW (ref 2–14)
MONOCYTES NFR BLD AUTO: 0 % — LOW (ref 2–14)
MONOCYTES NFR BLD AUTO: 1 % — LOW (ref 2–14)
MONOCYTES NFR BLD AUTO: 2.6 % — SIGNIFICANT CHANGE UP (ref 2–14)
MONOCYTES NFR BLD AUTO: 2.7 % — SIGNIFICANT CHANGE UP (ref 2–14)
MONOCYTES NFR BLD AUTO: 3.5 % — SIGNIFICANT CHANGE UP (ref 2–14)
MONOCYTES NFR BLD AUTO: 4 % — SIGNIFICANT CHANGE UP (ref 2–14)
MONOCYTES NFR BLD AUTO: 4.1 % — SIGNIFICANT CHANGE UP (ref 2–14)
MONOCYTES NFR BLD AUTO: 4.4 % — SIGNIFICANT CHANGE UP (ref 2–14)
MONOCYTES NFR BLD AUTO: 4.6 % — SIGNIFICANT CHANGE UP (ref 2–14)
MONOCYTES NFR BLD AUTO: 5.3 % — SIGNIFICANT CHANGE UP (ref 2–14)
MONOCYTES NFR BLD AUTO: 9 % — SIGNIFICANT CHANGE UP (ref 2–14)
MYELOCYTES NFR BLD: 0.9 % — HIGH (ref 0–0)
MYELOCYTES NFR BLD: 1 % — HIGH (ref 0–0)
MYELOCYTES NFR BLD: 3 % — HIGH (ref 0–0)
MYELOCYTES NFR BLD: 3.5 % — HIGH (ref 0–0)
MYELOCYTES NFR BLD: 3.5 % — HIGH (ref 0–0)
MYELOCYTES NFR BLD: 4.3 % — HIGH (ref 0–0)
MYELOCYTES NFR BLD: 4.6 % — HIGH (ref 0–0)
MYELOCYTES NFR BLD: 5.4 % — HIGH (ref 0–0)
NEUTROPHILS # BLD AUTO: 1.4 K/UL — LOW (ref 1.8–7.4)
NEUTROPHILS # BLD AUTO: 1.49 K/UL — LOW (ref 1.8–7.4)
NEUTROPHILS # BLD AUTO: 2.15 K/UL — SIGNIFICANT CHANGE UP (ref 1.8–7.4)
NEUTROPHILS # BLD AUTO: 2.29 K/UL — SIGNIFICANT CHANGE UP (ref 1.8–7.4)
NEUTROPHILS # BLD AUTO: 2.68 K/UL — SIGNIFICANT CHANGE UP (ref 1.8–7.4)
NEUTROPHILS # BLD AUTO: 2.87 K/UL — SIGNIFICANT CHANGE UP (ref 1.8–7.4)
NEUTROPHILS # BLD AUTO: 3.05 K/UL — SIGNIFICANT CHANGE UP (ref 1.8–7.4)
NEUTROPHILS # BLD AUTO: 3.05 K/UL — SIGNIFICANT CHANGE UP (ref 1.8–7.4)
NEUTROPHILS # BLD AUTO: 3.07 K/UL — SIGNIFICANT CHANGE UP (ref 1.8–7.4)
NEUTROPHILS # BLD AUTO: 3.32 K/UL — SIGNIFICANT CHANGE UP (ref 1.8–7.4)
NEUTROPHILS # BLD AUTO: 3.41 K/UL — SIGNIFICANT CHANGE UP (ref 1.8–7.4)
NEUTROPHILS # BLD AUTO: 3.44 K/UL — SIGNIFICANT CHANGE UP (ref 1.8–7.4)
NEUTROPHILS # BLD AUTO: 3.69 K/UL — SIGNIFICANT CHANGE UP (ref 1.8–7.4)
NEUTROPHILS # BLD AUTO: 3.76 K/UL — SIGNIFICANT CHANGE UP (ref 1.8–7.4)
NEUTROPHILS # BLD AUTO: 3.96 K/UL — SIGNIFICANT CHANGE UP (ref 1.8–7.4)
NEUTROPHILS NFR BLD AUTO: 68 % — SIGNIFICANT CHANGE UP (ref 43–77)
NEUTROPHILS NFR BLD AUTO: 68.4 % — SIGNIFICANT CHANGE UP (ref 43–77)
NEUTROPHILS NFR BLD AUTO: 68.8 % — SIGNIFICANT CHANGE UP (ref 43–77)
NEUTROPHILS NFR BLD AUTO: 69 % — SIGNIFICANT CHANGE UP (ref 43–77)
NEUTROPHILS NFR BLD AUTO: 70.5 % — SIGNIFICANT CHANGE UP (ref 43–77)
NEUTROPHILS NFR BLD AUTO: 72 % — SIGNIFICANT CHANGE UP (ref 43–77)
NEUTROPHILS NFR BLD AUTO: 75 % — SIGNIFICANT CHANGE UP (ref 43–77)
NEUTROPHILS NFR BLD AUTO: 78 % — HIGH (ref 43–77)
NEUTROPHILS NFR BLD AUTO: 78.2 % — HIGH (ref 43–77)
NEUTROPHILS NFR BLD AUTO: 78.4 % — HIGH (ref 43–77)
NEUTROPHILS NFR BLD AUTO: 78.9 % — HIGH (ref 43–77)
NEUTROPHILS NFR BLD AUTO: 80.7 % — HIGH (ref 43–77)
NEUTROPHILS NFR BLD AUTO: 81 % — HIGH (ref 43–77)
NEUTROPHILS NFR BLD AUTO: 83 % — HIGH (ref 43–77)
NEUTROPHILS NFR BLD AUTO: 83 % — HIGH (ref 43–77)
NEUTS BAND # BLD: 0.5 % — SIGNIFICANT CHANGE UP (ref 0–8)
NEUTS BAND # BLD: 0.9 % — SIGNIFICANT CHANGE UP (ref 0–8)
NEUTS BAND # BLD: 0.9 % — SIGNIFICANT CHANGE UP (ref 0–8)
NEUTS BAND # BLD: 1.7 % — SIGNIFICANT CHANGE UP (ref 0–8)
NEUTS BAND # BLD: 1.8 % — SIGNIFICANT CHANGE UP (ref 0–8)
NEUTS BAND # BLD: 5.3 % — SIGNIFICANT CHANGE UP (ref 0–8)
NITRITE UR-MCNC: NEGATIVE — SIGNIFICANT CHANGE UP
NRBC # BLD: 0 /100 WBCS — SIGNIFICANT CHANGE UP (ref 0–0)
NRBC # BLD: 0 /100 — SIGNIFICANT CHANGE UP (ref 0–0)
NRBC # BLD: 1 /100 WBCS — HIGH (ref 0–0)
NRBC # BLD: 1 /100 — HIGH (ref 0–0)
NRBC # BLD: 2 /100 — HIGH (ref 0–0)
NRBC # BLD: 3 /100 — HIGH (ref 0–0)
NRBC # BLD: 4 /100 — HIGH (ref 0–0)
NRBC # BLD: 4 /100 — HIGH (ref 0–0)
NRBC # BLD: 9 /100 — HIGH (ref 0–0)
NRBC # BLD: SIGNIFICANT CHANGE UP /100 WBCS (ref 0–0)
NT-PROBNP SERPL-SCNC: 29 PG/ML — SIGNIFICANT CHANGE UP (ref 0–300)
OB PNL STL: NEGATIVE — SIGNIFICANT CHANGE UP
ONKOSIGHT MYELOID SEQUENCE: SIGNIFICANT CHANGE UP
OVALOCYTES BLD QL SMEAR: SLIGHT — SIGNIFICANT CHANGE UP
PH UR: 6 — SIGNIFICANT CHANGE UP (ref 5–8)
PHOSPHATE SERPL-MCNC: 3 MG/DL — SIGNIFICANT CHANGE UP (ref 2.5–4.5)
PHOSPHATE SERPL-MCNC: 3.2 MG/DL — SIGNIFICANT CHANGE UP (ref 2.5–4.5)
PHOSPHATE SERPL-MCNC: 3.3 MG/DL — SIGNIFICANT CHANGE UP (ref 2.5–4.5)
PHOSPHATE SERPL-MCNC: 3.4 MG/DL — SIGNIFICANT CHANGE UP (ref 2.5–4.5)
PHOSPHATE SERPL-MCNC: 3.5 MG/DL — SIGNIFICANT CHANGE UP (ref 2.5–4.5)
PHOSPHATE SERPL-MCNC: 3.5 MG/DL — SIGNIFICANT CHANGE UP (ref 2.5–4.5)
PHOSPHATE SERPL-MCNC: 3.8 MG/DL — SIGNIFICANT CHANGE UP (ref 2.5–4.5)
PHOSPHATE SERPL-MCNC: 4.1 MG/DL — SIGNIFICANT CHANGE UP (ref 2.5–4.5)
PLAT MORPH BLD: ABNORMAL
PLAT MORPH BLD: ABNORMAL
PLAT MORPH BLD: NORMAL — SIGNIFICANT CHANGE UP
PLATELET # BLD AUTO: 41 K/UL — LOW (ref 150–400)
PLATELET # BLD AUTO: 42 K/UL — LOW (ref 150–400)
PLATELET # BLD AUTO: 43 K/UL — LOW (ref 150–400)
PLATELET # BLD AUTO: 47 K/UL — LOW (ref 150–400)
PLATELET # BLD AUTO: 47 K/UL — LOW (ref 150–400)
PLATELET # BLD AUTO: 49 K/UL — LOW (ref 150–400)
PLATELET # BLD AUTO: 50 K/UL — LOW (ref 150–400)
PLATELET # BLD AUTO: 53 K/UL — LOW (ref 150–400)
PLATELET # BLD AUTO: 54 K/UL — LOW (ref 150–400)
PLATELET # BLD AUTO: 55 K/UL — LOW (ref 150–400)
PLATELET # BLD AUTO: 55 K/UL — LOW (ref 150–400)
PLATELET # BLD AUTO: 57 K/UL — LOW (ref 150–400)
PLATELET # BLD AUTO: 59 K/UL — LOW (ref 150–400)
PLATELET # BLD AUTO: 61 K/UL — LOW (ref 150–400)
PLATELET # BLD AUTO: 62 K/UL — LOW (ref 150–400)
PLATELET # BLD AUTO: 82 K/UL — LOW (ref 150–400)
POIKILOCYTOSIS BLD QL AUTO: SLIGHT — SIGNIFICANT CHANGE UP
POLYCHROMASIA BLD QL SMEAR: SLIGHT — SIGNIFICANT CHANGE UP
POTASSIUM SERPL-MCNC: 3.9 MMOL/L — SIGNIFICANT CHANGE UP (ref 3.5–5.3)
POTASSIUM SERPL-MCNC: 4.1 MMOL/L — SIGNIFICANT CHANGE UP (ref 3.5–5.3)
POTASSIUM SERPL-MCNC: 4.2 MMOL/L — SIGNIFICANT CHANGE UP (ref 3.5–5.3)
POTASSIUM SERPL-MCNC: 4.2 MMOL/L — SIGNIFICANT CHANGE UP (ref 3.5–5.3)
POTASSIUM SERPL-MCNC: 4.3 MMOL/L — SIGNIFICANT CHANGE UP (ref 3.5–5.3)
POTASSIUM SERPL-MCNC: 4.3 MMOL/L — SIGNIFICANT CHANGE UP (ref 3.5–5.3)
POTASSIUM SERPL-MCNC: 4.4 MMOL/L — SIGNIFICANT CHANGE UP (ref 3.5–5.3)
POTASSIUM SERPL-MCNC: 4.6 MMOL/L — SIGNIFICANT CHANGE UP (ref 3.5–5.3)
POTASSIUM SERPL-MCNC: 4.6 MMOL/L — SIGNIFICANT CHANGE UP (ref 3.5–5.3)
POTASSIUM SERPL-SCNC: 3.9 MMOL/L — SIGNIFICANT CHANGE UP (ref 3.5–5.3)
POTASSIUM SERPL-SCNC: 4.1 MMOL/L — SIGNIFICANT CHANGE UP (ref 3.5–5.3)
POTASSIUM SERPL-SCNC: 4.2 MMOL/L — SIGNIFICANT CHANGE UP (ref 3.5–5.3)
POTASSIUM SERPL-SCNC: 4.2 MMOL/L — SIGNIFICANT CHANGE UP (ref 3.5–5.3)
POTASSIUM SERPL-SCNC: 4.3 MMOL/L — SIGNIFICANT CHANGE UP (ref 3.5–5.3)
POTASSIUM SERPL-SCNC: 4.3 MMOL/L — SIGNIFICANT CHANGE UP (ref 3.5–5.3)
POTASSIUM SERPL-SCNC: 4.4 MMOL/L — SIGNIFICANT CHANGE UP (ref 3.5–5.3)
POTASSIUM SERPL-SCNC: 4.5 MMOL/L
POTASSIUM SERPL-SCNC: 4.6 MMOL/L — SIGNIFICANT CHANGE UP (ref 3.5–5.3)
POTASSIUM SERPL-SCNC: 4.6 MMOL/L — SIGNIFICANT CHANGE UP (ref 3.5–5.3)
PROMYELOCYTES # FLD: 0.9 % — HIGH (ref 0–0)
PROMYELOCYTES # FLD: 1.8 % — HIGH (ref 0–0)
PROT SERPL-MCNC: 6.6 G/DL — SIGNIFICANT CHANGE UP (ref 6–8.3)
PROT SERPL-MCNC: 6.8 G/DL — SIGNIFICANT CHANGE UP (ref 6–8.3)
PROT SERPL-MCNC: 7 G/DL — SIGNIFICANT CHANGE UP (ref 6–8.3)
PROT SERPL-MCNC: 7.1 G/DL
PROT SERPL-MCNC: 7.1 G/DL — SIGNIFICANT CHANGE UP (ref 6–8.3)
PROT SERPL-MCNC: 7.2 G/DL — SIGNIFICANT CHANGE UP (ref 6–8.3)
PROT SERPL-MCNC: 7.3 G/DL — SIGNIFICANT CHANGE UP (ref 6–8.3)
PROT SERPL-MCNC: 7.3 G/DL — SIGNIFICANT CHANGE UP (ref 6–8.3)
PROT SERPL-MCNC: 7.7 G/DL — SIGNIFICANT CHANGE UP (ref 6–8.3)
PROT SERPL-MCNC: 7.9 G/DL — SIGNIFICANT CHANGE UP (ref 6–8.3)
PROT SERPL-MCNC: 8 G/DL — SIGNIFICANT CHANGE UP (ref 6–8.3)
PROT SERPL-MCNC: 8.1 G/DL — SIGNIFICANT CHANGE UP (ref 6–8.3)
PROT UR-MCNC: SIGNIFICANT CHANGE UP
PROTHROM AB SERPL-ACNC: 14.2 SEC — HIGH (ref 10.5–13.4)
PROTHROM AB SERPL-ACNC: 14.6 SEC — HIGH (ref 10.5–13.4)
RBC # BLD: 2.32 M/UL — LOW (ref 4.2–5.8)
RBC # BLD: 2.36 M/UL — LOW (ref 4.2–5.8)
RBC # BLD: 2.39 M/UL — LOW (ref 4.2–5.8)
RBC # BLD: 2.47 M/UL — LOW (ref 4.2–5.8)
RBC # BLD: 2.51 M/UL — LOW (ref 4.2–5.8)
RBC # BLD: 2.56 M/UL — LOW (ref 4.2–5.8)
RBC # BLD: 2.6 M/UL — LOW (ref 4.2–5.8)
RBC # BLD: 2.64 M/UL — LOW (ref 4.2–5.8)
RBC # BLD: 2.65 M/UL — LOW (ref 4.2–5.8)
RBC # BLD: 2.72 M/UL — LOW (ref 4.2–5.8)
RBC # BLD: 2.84 M/UL — LOW (ref 4.2–5.8)
RBC # BLD: 2.86 M/UL — LOW (ref 4.2–5.8)
RBC # BLD: 2.9 M/UL — LOW (ref 4.2–5.8)
RBC # BLD: 2.96 M/UL — LOW (ref 4.2–5.8)
RBC # BLD: 3.3 M/UL — LOW (ref 4.2–5.8)
RBC # BLD: 3.47 M/UL — LOW (ref 4.2–5.8)
RBC # BLD: 3.61 M/UL — LOW (ref 4.2–5.8)
RBC # FLD: 12.3 % — SIGNIFICANT CHANGE UP (ref 10.3–14.5)
RBC # FLD: 12.6 % — SIGNIFICANT CHANGE UP (ref 10.3–14.5)
RBC # FLD: 12.7 % — SIGNIFICANT CHANGE UP (ref 10.3–14.5)
RBC # FLD: 12.8 % — SIGNIFICANT CHANGE UP (ref 10.3–14.5)
RBC # FLD: 12.9 % — SIGNIFICANT CHANGE UP (ref 10.3–14.5)
RBC # FLD: 13.1 % — SIGNIFICANT CHANGE UP (ref 10.3–14.5)
RBC # FLD: 13.1 % — SIGNIFICANT CHANGE UP (ref 10.3–14.5)
RBC # FLD: 13.2 % — SIGNIFICANT CHANGE UP (ref 10.3–14.5)
RBC # FLD: 13.3 % — SIGNIFICANT CHANGE UP (ref 10.3–14.5)
RBC BLD AUTO: ABNORMAL
RBC BLD AUTO: SIGNIFICANT CHANGE UP
RBC CASTS # UR COMP ASSIST: 2 /HPF — SIGNIFICANT CHANGE UP (ref 0–4)
RETICS #: 44 K/UL — SIGNIFICANT CHANGE UP (ref 25–125)
RETICS/RBC NFR: 1.8 % — SIGNIFICANT CHANGE UP (ref 0.5–2.5)
RH IG SCN BLD-IMP: POSITIVE — SIGNIFICANT CHANGE UP
RSV RNA NPH QL NAA+NON-PROBE: SIGNIFICANT CHANGE UP
SARS-COV-2 RNA SPEC QL NAA+PROBE: SIGNIFICANT CHANGE UP
SCHISTOCYTES BLD QL AUTO: SLIGHT — SIGNIFICANT CHANGE UP
SCHISTOCYTES BLD QL AUTO: SLIGHT — SIGNIFICANT CHANGE UP
SODIUM SERPL-SCNC: 136 MMOL/L — SIGNIFICANT CHANGE UP (ref 135–145)
SODIUM SERPL-SCNC: 137 MMOL/L — SIGNIFICANT CHANGE UP (ref 135–145)
SODIUM SERPL-SCNC: 138 MMOL/L
SODIUM SERPL-SCNC: 138 MMOL/L — SIGNIFICANT CHANGE UP (ref 135–145)
SODIUM SERPL-SCNC: 139 MMOL/L — SIGNIFICANT CHANGE UP (ref 135–145)
SODIUM SERPL-SCNC: 140 MMOL/L — SIGNIFICANT CHANGE UP (ref 135–145)
SODIUM SERPL-SCNC: 141 MMOL/L — SIGNIFICANT CHANGE UP (ref 135–145)
SP GR SPEC: 1.03 — HIGH (ref 1.01–1.02)
TIBC SERPL-MCNC: 325 UG/DL — SIGNIFICANT CHANGE UP (ref 220–430)
TM INTERPRETATION: SIGNIFICANT CHANGE UP
TROPONIN T, HIGH SENSITIVITY RESULT: 10 NG/L — SIGNIFICANT CHANGE UP (ref 0–51)
TROPONIN T, HIGH SENSITIVITY RESULT: 10 NG/L — SIGNIFICANT CHANGE UP (ref 0–51)
UIBC SERPL-MCNC: 176 UG/DL — SIGNIFICANT CHANGE UP (ref 110–370)
URATE SERPL-MCNC: 3.2 MG/DL — LOW (ref 3.4–8.8)
URATE SERPL-MCNC: 3.4 MG/DL — SIGNIFICANT CHANGE UP (ref 3.4–8.8)
URATE SERPL-MCNC: 3.4 MG/DL — SIGNIFICANT CHANGE UP (ref 3.4–8.8)
URATE SERPL-MCNC: 3.5 MG/DL — SIGNIFICANT CHANGE UP (ref 3.4–8.8)
UROBILINOGEN FLD QL: ABNORMAL
VIT B12 SERPL-MCNC: 1518 PG/ML — HIGH (ref 232–1245)
VIT B12 SERPL-MCNC: 1623 PG/ML — HIGH (ref 232–1245)
WBC # BLD: 1.94 K/UL — LOW (ref 3.8–10.5)
WBC # BLD: 2.16 K/UL — LOW (ref 3.8–10.5)
WBC # BLD: 3.1 K/UL — LOW (ref 3.8–10.5)
WBC # BLD: 3.37 K/UL — LOW (ref 3.8–10.5)
WBC # BLD: 3.4 K/UL — LOW (ref 3.8–10.5)
WBC # BLD: 3.67 K/UL — LOW (ref 3.8–10.5)
WBC # BLD: 3.69 K/UL — LOW (ref 3.8–10.5)
WBC # BLD: 3.92 K/UL — SIGNIFICANT CHANGE UP (ref 3.8–10.5)
WBC # BLD: 4.07 K/UL — SIGNIFICANT CHANGE UP (ref 3.8–10.5)
WBC # BLD: 4.07 K/UL — SIGNIFICANT CHANGE UP (ref 3.8–10.5)
WBC # BLD: 4.26 K/UL — SIGNIFICANT CHANGE UP (ref 3.8–10.5)
WBC # BLD: 4.39 K/UL — SIGNIFICANT CHANGE UP (ref 3.8–10.5)
WBC # BLD: 4.57 K/UL — SIGNIFICANT CHANGE UP (ref 3.8–10.5)
WBC # BLD: 4.96 K/UL — SIGNIFICANT CHANGE UP (ref 3.8–10.5)
WBC # BLD: 5.24 K/UL — SIGNIFICANT CHANGE UP (ref 3.8–10.5)
WBC # BLD: 6.51 K/UL — SIGNIFICANT CHANGE UP (ref 3.8–10.5)
WBC # FLD AUTO: 1.94 K/UL — LOW (ref 3.8–10.5)
WBC # FLD AUTO: 2.16 K/UL — LOW (ref 3.8–10.5)
WBC # FLD AUTO: 3.1 K/UL — LOW (ref 3.8–10.5)
WBC # FLD AUTO: 3.37 K/UL — LOW (ref 3.8–10.5)
WBC # FLD AUTO: 3.4 K/UL — LOW (ref 3.8–10.5)
WBC # FLD AUTO: 3.67 K/UL — LOW (ref 3.8–10.5)
WBC # FLD AUTO: 3.69 K/UL — LOW (ref 3.8–10.5)
WBC # FLD AUTO: 3.92 K/UL — SIGNIFICANT CHANGE UP (ref 3.8–10.5)
WBC # FLD AUTO: 4.07 K/UL — SIGNIFICANT CHANGE UP (ref 3.8–10.5)
WBC # FLD AUTO: 4.07 K/UL — SIGNIFICANT CHANGE UP (ref 3.8–10.5)
WBC # FLD AUTO: 4.26 K/UL — SIGNIFICANT CHANGE UP (ref 3.8–10.5)
WBC # FLD AUTO: 4.39 K/UL — SIGNIFICANT CHANGE UP (ref 3.8–10.5)
WBC # FLD AUTO: 4.57 K/UL — SIGNIFICANT CHANGE UP (ref 3.8–10.5)
WBC # FLD AUTO: 4.96 K/UL — SIGNIFICANT CHANGE UP (ref 3.8–10.5)
WBC # FLD AUTO: 5.24 K/UL — SIGNIFICANT CHANGE UP (ref 3.8–10.5)
WBC # FLD AUTO: 6.51 K/UL — SIGNIFICANT CHANGE UP (ref 3.8–10.5)
WBC UR QL: 1 /HPF — SIGNIFICANT CHANGE UP (ref 0–5)

## 2022-01-01 PROCEDURE — 99220: CPT

## 2022-01-01 PROCEDURE — P9016: CPT

## 2022-01-01 PROCEDURE — 82746 ASSAY OF FOLIC ACID SERUM: CPT

## 2022-01-01 PROCEDURE — 86900 BLOOD TYPING SEROLOGIC ABO: CPT

## 2022-01-01 PROCEDURE — 83880 ASSAY OF NATRIURETIC PEPTIDE: CPT

## 2022-01-01 PROCEDURE — P9040: CPT

## 2022-01-01 PROCEDURE — 82955 ASSAY OF G6PD ENZYME: CPT

## 2022-01-01 PROCEDURE — 94640 AIRWAY INHALATION TREATMENT: CPT

## 2022-01-01 PROCEDURE — 88342 IMHCHEM/IMCYTCHM 1ST ANTB: CPT | Mod: 26,59

## 2022-01-01 PROCEDURE — 99239 HOSP IP/OBS DSCHRG MGMT >30: CPT

## 2022-01-01 PROCEDURE — 99285 EMERGENCY DEPT VISIT HI MDM: CPT

## 2022-01-01 PROCEDURE — 80074 ACUTE HEPATITIS PANEL: CPT

## 2022-01-01 PROCEDURE — 86905 BLOOD TYPING RBC ANTIGENS: CPT

## 2022-01-01 PROCEDURE — 93005 ELECTROCARDIOGRAM TRACING: CPT

## 2022-01-01 PROCEDURE — 82607 VITAMIN B-12: CPT

## 2022-01-01 PROCEDURE — 86077 PHYS BLOOD BANK SERV XMATCH: CPT

## 2022-01-01 PROCEDURE — P9011: CPT

## 2022-01-01 PROCEDURE — 85045 AUTOMATED RETICULOCYTE COUNT: CPT

## 2022-01-01 PROCEDURE — 88313 SPECIAL STAINS GROUP 2: CPT | Mod: 26

## 2022-01-01 PROCEDURE — 99238 HOSP IP/OBS DSCHRG MGMT 30/<: CPT | Mod: GC

## 2022-01-01 PROCEDURE — 88271 CYTOGENETICS DNA PROBE: CPT

## 2022-01-01 PROCEDURE — 99233 SBSQ HOSP IP/OBS HIGH 50: CPT

## 2022-01-01 PROCEDURE — 93306 TTE W/DOPPLER COMPLETE: CPT

## 2022-01-01 PROCEDURE — 88305 TISSUE EXAM BY PATHOLOGIST: CPT | Mod: 26

## 2022-01-01 PROCEDURE — 86985 SPLIT BLOOD OR PRODUCTS: CPT

## 2022-01-01 PROCEDURE — 87637 SARSCOV2&INF A&B&RSV AMP PRB: CPT

## 2022-01-01 PROCEDURE — 82962 GLUCOSE BLOOD TEST: CPT

## 2022-01-01 PROCEDURE — 86902 BLOOD TYPE ANTIGEN DONOR EA: CPT

## 2022-01-01 PROCEDURE — 84550 ASSAY OF BLOOD/URIC ACID: CPT

## 2022-01-01 PROCEDURE — 86870 RBC ANTIBODY IDENTIFICATION: CPT

## 2022-01-01 PROCEDURE — 85610 PROTHROMBIN TIME: CPT

## 2022-01-01 PROCEDURE — 84484 ASSAY OF TROPONIN QUANT: CPT

## 2022-01-01 PROCEDURE — 83615 LACTATE (LD) (LDH) ENZYME: CPT

## 2022-01-01 PROCEDURE — 86923 COMPATIBILITY TEST ELECTRIC: CPT

## 2022-01-01 PROCEDURE — 83735 ASSAY OF MAGNESIUM: CPT

## 2022-01-01 PROCEDURE — 85027 COMPLETE CBC AUTOMATED: CPT

## 2022-01-01 PROCEDURE — 88264 CHROMOSOME ANALYSIS 20-25: CPT

## 2022-01-01 PROCEDURE — 71045 X-RAY EXAM CHEST 1 VIEW: CPT

## 2022-01-01 PROCEDURE — 99215 OFFICE O/P EST HI 40 MIN: CPT

## 2022-01-01 PROCEDURE — 83010 ASSAY OF HAPTOGLOBIN QUANT: CPT

## 2022-01-01 PROCEDURE — 83540 ASSAY OF IRON: CPT

## 2022-01-01 PROCEDURE — 87205 SMEAR GRAM STAIN: CPT

## 2022-01-01 PROCEDURE — 86922 COMPATIBILITY TEST ANTIGLOB: CPT

## 2022-01-01 PROCEDURE — 82247 BILIRUBIN TOTAL: CPT

## 2022-01-01 PROCEDURE — 88291 CYTO/MOLECULAR REPORT: CPT

## 2022-01-01 PROCEDURE — 83036 HEMOGLOBIN GLYCOSYLATED A1C: CPT

## 2022-01-01 PROCEDURE — 86706 HEP B SURFACE ANTIBODY: CPT

## 2022-01-01 PROCEDURE — 82272 OCCULT BLD FECES 1-3 TESTS: CPT

## 2022-01-01 PROCEDURE — 87389 HIV-1 AG W/HIV-1&-2 AB AG IA: CPT

## 2022-01-01 PROCEDURE — 93010 ELECTROCARDIOGRAM REPORT: CPT

## 2022-01-01 PROCEDURE — 36415 COLL VENOUS BLD VENIPUNCTURE: CPT

## 2022-01-01 PROCEDURE — 99223 1ST HOSP IP/OBS HIGH 75: CPT | Mod: GC

## 2022-01-01 PROCEDURE — 80053 COMPREHEN METABOLIC PANEL: CPT

## 2022-01-01 PROCEDURE — 86850 RBC ANTIBODY SCREEN: CPT

## 2022-01-01 PROCEDURE — 12345: CPT | Mod: NC,GC

## 2022-01-01 PROCEDURE — 88341 IMHCHEM/IMCYTCHM EA ADD ANTB: CPT

## 2022-01-01 PROCEDURE — 97161 PT EVAL LOW COMPLEX 20 MIN: CPT

## 2022-01-01 PROCEDURE — 81450 HL NEO GSAP 5-50DNA/DNA&RNA: CPT

## 2022-01-01 PROCEDURE — 76700 US EXAM ABDOM COMPLETE: CPT | Mod: 26

## 2022-01-01 PROCEDURE — 83550 IRON BINDING TEST: CPT

## 2022-01-01 PROCEDURE — 86901 BLOOD TYPING SEROLOGIC RH(D): CPT

## 2022-01-01 PROCEDURE — 82248 BILIRUBIN DIRECT: CPT

## 2022-01-01 PROCEDURE — 88280 CHROMOSOME KARYOTYPE STUDY: CPT

## 2022-01-01 PROCEDURE — 86803 HEPATITIS C AB TEST: CPT

## 2022-01-01 PROCEDURE — 86704 HEP B CORE ANTIBODY TOTAL: CPT

## 2022-01-01 PROCEDURE — 93306 TTE W/DOPPLER COMPLETE: CPT | Mod: 26

## 2022-01-01 PROCEDURE — 86880 COOMBS TEST DIRECT: CPT

## 2022-01-01 PROCEDURE — 88313 SPECIAL STAINS GROUP 2: CPT

## 2022-01-01 PROCEDURE — 76700 US EXAM ABDOM COMPLETE: CPT

## 2022-01-01 PROCEDURE — 99223 1ST HOSP IP/OBS HIGH 75: CPT

## 2022-01-01 PROCEDURE — 85384 FIBRINOGEN ACTIVITY: CPT

## 2022-01-01 PROCEDURE — 84100 ASSAY OF PHOSPHORUS: CPT

## 2022-01-01 PROCEDURE — 36430 TRANSFUSION BLD/BLD COMPNT: CPT

## 2022-01-01 PROCEDURE — 81382 HLA II TYPING 1 LOC HR: CPT

## 2022-01-01 PROCEDURE — 71045 X-RAY EXAM CHEST 1 VIEW: CPT | Mod: 26

## 2022-01-01 PROCEDURE — 85097 BONE MARROW INTERPRETATION: CPT

## 2022-01-01 PROCEDURE — 88185 FLOWCYTOMETRY/TC ADD-ON: CPT

## 2022-01-01 PROCEDURE — 81001 URINALYSIS AUTO W/SCOPE: CPT

## 2022-01-01 PROCEDURE — 82728 ASSAY OF FERRITIN: CPT

## 2022-01-01 PROCEDURE — 88305 TISSUE EXAM BY PATHOLOGIST: CPT

## 2022-01-01 PROCEDURE — 88275 CYTOGENETICS 100-300: CPT

## 2022-01-01 PROCEDURE — 86078 PHYS BLOOD BANK SERV REACTJ: CPT

## 2022-01-01 PROCEDURE — 99217: CPT

## 2022-01-01 PROCEDURE — 85730 THROMBOPLASTIN TIME PARTIAL: CPT

## 2022-01-01 PROCEDURE — G0378: CPT

## 2022-01-01 PROCEDURE — 81378 HLA I & II TYPING HR: CPT

## 2022-01-01 PROCEDURE — 88189 FLOWCYTOMETRY/READ 16 & >: CPT

## 2022-01-01 PROCEDURE — 88342 IMHCHEM/IMCYTCHM 1ST ANTB: CPT

## 2022-01-01 PROCEDURE — 96374 THER/PROPH/DIAG INJ IV PUSH: CPT

## 2022-01-01 PROCEDURE — 85025 COMPLETE CBC W/AUTO DIFF WBC: CPT

## 2022-01-01 PROCEDURE — 88237 TISSUE CULTURE BONE MARROW: CPT

## 2022-01-01 PROCEDURE — 88341 IMHCHEM/IMCYTCHM EA ADD ANTB: CPT | Mod: 26,59

## 2022-01-01 RX ORDER — VENETOCLAX 100 MG/1
200 TABLET, FILM COATED ORAL ONCE
Refills: 0 | Status: COMPLETED | OUTPATIENT
Start: 2022-01-01 | End: 2022-01-01

## 2022-01-01 RX ORDER — INSULIN LISPRO 100/ML
VIAL (ML) SUBCUTANEOUS
Refills: 0 | Status: DISCONTINUED | OUTPATIENT
Start: 2022-01-01 | End: 2022-01-01

## 2022-01-01 RX ORDER — VENETOCLAX 100 MG/1
100 TABLET, FILM COATED ORAL ONCE
Refills: 0 | Status: COMPLETED | OUTPATIENT
Start: 2022-01-01 | End: 2022-01-01

## 2022-01-01 RX ORDER — GLUCAGON INJECTION, SOLUTION 0.5 MG/.1ML
1 INJECTION, SOLUTION SUBCUTANEOUS ONCE
Refills: 0 | Status: DISCONTINUED | OUTPATIENT
Start: 2022-01-01 | End: 2022-01-01

## 2022-01-01 RX ORDER — ACETAMINOPHEN 500 MG
650 TABLET ORAL EVERY 6 HOURS
Refills: 0 | Status: DISCONTINUED | OUTPATIENT
Start: 2022-01-01 | End: 2022-01-01

## 2022-01-01 RX ORDER — ALLOPURINOL 300 MG
300 TABLET ORAL DAILY
Refills: 0 | Status: DISCONTINUED | OUTPATIENT
Start: 2022-01-01 | End: 2022-01-01

## 2022-01-01 RX ORDER — IPRATROPIUM/ALBUTEROL SULFATE 18-103MCG
3 AEROSOL WITH ADAPTER (GRAM) INHALATION ONCE
Refills: 0 | Status: COMPLETED | OUTPATIENT
Start: 2022-01-01 | End: 2022-01-01

## 2022-01-01 RX ORDER — DEXTROSE 50 % IN WATER 50 %
25 SYRINGE (ML) INTRAVENOUS ONCE
Refills: 0 | Status: DISCONTINUED | OUTPATIENT
Start: 2022-01-01 | End: 2022-01-01

## 2022-01-01 RX ORDER — MAGNESIUM SULFATE 500 MG/ML
2 VIAL (ML) INJECTION ONCE
Refills: 0 | Status: COMPLETED | OUTPATIENT
Start: 2022-01-01 | End: 2022-01-01

## 2022-01-01 RX ORDER — AZACITIDINE FOR 100 MG/1
49.33 INJECTION, POWDER, LYOPHILIZED, FOR SOLUTION INTRAVENOUS; SUBCUTANEOUS EVERY 24 HOURS
Refills: 0 | Status: COMPLETED | OUTPATIENT
Start: 2022-01-01 | End: 2022-01-01

## 2022-01-01 RX ORDER — VALACYCLOVIR 500 MG/1
1 TABLET, FILM COATED ORAL
Qty: 60 | Refills: 2
Start: 2022-01-01 | End: 2023-01-01

## 2022-01-01 RX ORDER — BLOOD SUGAR DIAGNOSTIC
STRIP MISCELLANEOUS
Qty: 100 | Refills: 0 | Status: ACTIVE | COMMUNITY
Start: 2022-01-01

## 2022-01-01 RX ORDER — POSACONAZOLE 100 MG/1
3 TABLET, DELAYED RELEASE ORAL
Qty: 90 | Refills: 0
Start: 2022-01-01 | End: 2023-01-01

## 2022-01-01 RX ORDER — DEXTROSE 50 % IN WATER 50 %
15 SYRINGE (ML) INTRAVENOUS ONCE
Refills: 0 | Status: DISCONTINUED | OUTPATIENT
Start: 2022-01-01 | End: 2022-01-01

## 2022-01-01 RX ORDER — METFORMIN ER 500 MG 500 MG/1
500 TABLET ORAL
Qty: 360 | Refills: 0 | Status: ACTIVE | COMMUNITY
Start: 2022-01-01

## 2022-01-01 RX ORDER — HEPARIN SODIUM 5000 [USP'U]/ML
5000 INJECTION INTRAVENOUS; SUBCUTANEOUS ONCE
Refills: 0 | Status: COMPLETED | OUTPATIENT
Start: 2022-01-01 | End: 2022-01-01

## 2022-01-01 RX ORDER — SODIUM CHLORIDE 9 MG/ML
3 INJECTION INTRAMUSCULAR; INTRAVENOUS; SUBCUTANEOUS EVERY 8 HOURS
Refills: 0 | Status: DISCONTINUED | OUTPATIENT
Start: 2022-01-01 | End: 2022-01-01

## 2022-01-01 RX ORDER — MAGNESIUM SULFATE 500 MG/ML
2 VIAL (ML) INJECTION ONCE
Refills: 0 | Status: DISCONTINUED | OUTPATIENT
Start: 2022-01-01 | End: 2022-01-01

## 2022-01-01 RX ORDER — INSULIN LISPRO 100/ML
VIAL (ML) SUBCUTANEOUS AT BEDTIME
Refills: 0 | Status: DISCONTINUED | OUTPATIENT
Start: 2022-01-01 | End: 2022-01-01

## 2022-01-01 RX ORDER — DICLOFENAC SODIUM 75 MG/1
75 TABLET, DELAYED RELEASE ORAL
Qty: 30 | Refills: 0 | Status: ACTIVE | COMMUNITY
Start: 2022-01-01

## 2022-01-01 RX ORDER — SODIUM CHLORIDE 9 MG/ML
1000 INJECTION INTRAMUSCULAR; INTRAVENOUS; SUBCUTANEOUS
Refills: 0 | Status: DISCONTINUED | OUTPATIENT
Start: 2022-01-01 | End: 2022-01-01

## 2022-01-01 RX ORDER — VENETOCLAX 100 MG/1
400 TABLET, FILM COATED ORAL
Refills: 0 | Status: DISCONTINUED | OUTPATIENT
Start: 2022-01-01 | End: 2022-01-01

## 2022-01-01 RX ORDER — LIDOCAINE HCL 20 MG/ML
20 VIAL (ML) INJECTION ONCE
Refills: 0 | Status: COMPLETED | OUTPATIENT
Start: 2022-01-01 | End: 2022-01-01

## 2022-01-01 RX ORDER — LANOLIN ALCOHOL/MO/W.PET/CERES
3 CREAM (GRAM) TOPICAL AT BEDTIME
Refills: 0 | Status: DISCONTINUED | OUTPATIENT
Start: 2022-01-01 | End: 2022-01-01

## 2022-01-01 RX ORDER — BACLOFEN 100 %
5 POWDER (GRAM) MISCELLANEOUS EVERY 6 HOURS
Refills: 0 | Status: DISCONTINUED | OUTPATIENT
Start: 2022-01-01 | End: 2022-01-01

## 2022-01-01 RX ORDER — LEVOFLOXACIN 5 MG/ML
1 INJECTION, SOLUTION INTRAVENOUS
Qty: 14 | Refills: 1
Start: 2022-01-01 | End: 2023-01-01

## 2022-01-01 RX ORDER — SODIUM CHLORIDE 9 MG/ML
1000 INJECTION, SOLUTION INTRAVENOUS
Refills: 0 | Status: DISCONTINUED | OUTPATIENT
Start: 2022-01-01 | End: 2022-01-01

## 2022-01-01 RX ORDER — ONDANSETRON 8 MG/1
8 TABLET, FILM COATED ORAL EVERY 24 HOURS
Refills: 0 | Status: COMPLETED | OUTPATIENT
Start: 2022-01-01 | End: 2022-01-01

## 2022-01-01 RX ORDER — SALIVA SUBSTITUTE COMB NO.11 351 MG
15 POWDER IN PACKET (EA) MUCOUS MEMBRANE THREE TIMES A DAY
Refills: 0 | Status: DISCONTINUED | OUTPATIENT
Start: 2022-01-01 | End: 2022-01-01

## 2022-01-01 RX ORDER — DIPHENHYDRAMINE HYDROCHLORIDE AND LIDOCAINE HYDROCHLORIDE AND ALUMINUM HYDROXIDE AND MAGNESIUM HYDRO
30 KIT
Refills: 0 | Status: DISCONTINUED | OUTPATIENT
Start: 2022-01-01 | End: 2022-01-01

## 2022-01-01 RX ORDER — VENETOCLAX 100 MG/1
4 TABLET, FILM COATED ORAL
Qty: 0 | Refills: 0 | DISCHARGE
Start: 2022-01-01

## 2022-01-01 RX ORDER — CHLORHEXIDINE GLUCONATE 213 G/1000ML
1 SOLUTION TOPICAL DAILY
Refills: 0 | Status: DISCONTINUED | OUTPATIENT
Start: 2022-01-01 | End: 2022-01-01

## 2022-01-01 RX ORDER — VENETOCLAX 100 MG/1
100 TABLET, FILM COATED ORAL
Qty: 120 | Refills: 5 | Status: ACTIVE | COMMUNITY
Start: 2022-01-01 | End: 1900-01-01

## 2022-01-01 RX ORDER — IRON SUCROSE 20 MG/ML
300 INJECTION, SOLUTION INTRAVENOUS ONCE
Refills: 0 | Status: COMPLETED | OUTPATIENT
Start: 2022-01-01 | End: 2022-01-01

## 2022-01-01 RX ORDER — FERROUS SULFATE TAB EC 325 MG (65 MG FE EQUIVALENT) 325 (65 FE) MG
325 (65 FE) TABLET DELAYED RESPONSE ORAL
Qty: 30 | Refills: 0 | Status: ACTIVE | COMMUNITY
Start: 2022-01-01

## 2022-01-01 RX ORDER — DEXTROSE 50 % IN WATER 50 %
12.5 SYRINGE (ML) INTRAVENOUS ONCE
Refills: 0 | Status: DISCONTINUED | OUTPATIENT
Start: 2022-01-01 | End: 2022-01-01

## 2022-01-01 RX ORDER — ATORVASTATIN CALCIUM 80 MG/1
40 TABLET, FILM COATED ORAL AT BEDTIME
Refills: 0 | Status: DISCONTINUED | OUTPATIENT
Start: 2022-01-01 | End: 2022-01-01

## 2022-01-01 RX ADMIN — Medication 2: at 08:08

## 2022-01-01 RX ADMIN — Medication 15 MILLILITER(S): at 06:33

## 2022-01-01 RX ADMIN — DIPHENHYDRAMINE HYDROCHLORIDE AND LIDOCAINE HYDROCHLORIDE AND ALUMINUM HYDROXIDE AND MAGNESIUM HYDRO 30 MILLILITER(S): KIT at 17:29

## 2022-01-01 RX ADMIN — Medication 300 MILLIGRAM(S): at 11:09

## 2022-01-01 RX ADMIN — VENETOCLAX 200 MILLIGRAM(S): 100 TABLET, FILM COATED ORAL at 17:28

## 2022-01-01 RX ADMIN — Medication 6: at 17:04

## 2022-01-01 RX ADMIN — Medication 15 MILLILITER(S): at 14:43

## 2022-01-01 RX ADMIN — SODIUM CHLORIDE 3 MILLILITER(S): 9 INJECTION INTRAMUSCULAR; INTRAVENOUS; SUBCUTANEOUS at 14:00

## 2022-01-01 RX ADMIN — VENETOCLAX 400 MILLIGRAM(S): 100 TABLET, FILM COATED ORAL at 17:06

## 2022-01-01 RX ADMIN — ONDANSETRON 8 MILLIGRAM(S): 8 TABLET, FILM COATED ORAL at 16:32

## 2022-01-01 RX ADMIN — Medication 15 MILLILITER(S): at 21:52

## 2022-01-01 RX ADMIN — SODIUM CHLORIDE 3 MILLILITER(S): 9 INJECTION INTRAMUSCULAR; INTRAVENOUS; SUBCUTANEOUS at 14:33

## 2022-01-01 RX ADMIN — Medication 2: at 11:45

## 2022-01-01 RX ADMIN — CHLORHEXIDINE GLUCONATE 1 APPLICATION(S): 213 SOLUTION TOPICAL at 17:20

## 2022-01-01 RX ADMIN — Medication 15 MILLILITER(S): at 15:01

## 2022-01-01 RX ADMIN — CHLORHEXIDINE GLUCONATE 1 APPLICATION(S): 213 SOLUTION TOPICAL at 12:19

## 2022-01-01 RX ADMIN — SODIUM CHLORIDE 3 MILLILITER(S): 9 INJECTION INTRAMUSCULAR; INTRAVENOUS; SUBCUTANEOUS at 05:43

## 2022-01-01 RX ADMIN — ONDANSETRON 8 MILLIGRAM(S): 8 TABLET, FILM COATED ORAL at 15:37

## 2022-01-01 RX ADMIN — Medication 650 MILLIGRAM(S): at 09:30

## 2022-01-01 RX ADMIN — Medication 300 MILLIGRAM(S): at 12:09

## 2022-01-01 RX ADMIN — SODIUM CHLORIDE 75 MILLILITER(S): 9 INJECTION INTRAMUSCULAR; INTRAVENOUS; SUBCUTANEOUS at 21:52

## 2022-01-01 RX ADMIN — DIPHENHYDRAMINE HYDROCHLORIDE AND LIDOCAINE HYDROCHLORIDE AND ALUMINUM HYDROXIDE AND MAGNESIUM HYDRO 30 MILLILITER(S): KIT at 17:19

## 2022-01-01 RX ADMIN — AZACITIDINE FOR 49.33 MILLIGRAM(S): 100 INJECTION, POWDER, LYOPHILIZED, FOR SOLUTION INTRAVENOUS; SUBCUTANEOUS at 15:51

## 2022-01-01 RX ADMIN — AZACITIDINE FOR 49.33 MILLIGRAM(S): 100 INJECTION, POWDER, LYOPHILIZED, FOR SOLUTION INTRAVENOUS; SUBCUTANEOUS at 16:01

## 2022-01-01 RX ADMIN — Medication 20 MILLIGRAM(S): at 17:30

## 2022-01-01 RX ADMIN — ATORVASTATIN CALCIUM 40 MILLIGRAM(S): 80 TABLET, FILM COATED ORAL at 23:11

## 2022-01-01 RX ADMIN — Medication 650 MILLIGRAM(S): at 10:42

## 2022-01-01 RX ADMIN — Medication 15 MILLILITER(S): at 14:35

## 2022-01-01 RX ADMIN — VENETOCLAX 400 MILLIGRAM(S): 100 TABLET, FILM COATED ORAL at 17:36

## 2022-01-01 RX ADMIN — DIPHENHYDRAMINE HYDROCHLORIDE AND LIDOCAINE HYDROCHLORIDE AND ALUMINUM HYDROXIDE AND MAGNESIUM HYDRO 30 MILLILITER(S): KIT at 06:17

## 2022-01-01 RX ADMIN — CHLORHEXIDINE GLUCONATE 1 APPLICATION(S): 213 SOLUTION TOPICAL at 12:04

## 2022-01-01 RX ADMIN — Medication 15 MILLILITER(S): at 05:17

## 2022-01-01 RX ADMIN — SODIUM CHLORIDE 75 MILLILITER(S): 9 INJECTION INTRAMUSCULAR; INTRAVENOUS; SUBCUTANEOUS at 17:29

## 2022-01-01 RX ADMIN — AZACITIDINE FOR 49.33 MILLIGRAM(S): 100 INJECTION, POWDER, LYOPHILIZED, FOR SOLUTION INTRAVENOUS; SUBCUTANEOUS at 16:16

## 2022-01-01 RX ADMIN — ONDANSETRON 8 MILLIGRAM(S): 8 TABLET, FILM COATED ORAL at 14:34

## 2022-01-01 RX ADMIN — Medication 15 MILLILITER(S): at 21:19

## 2022-01-01 RX ADMIN — CHLORHEXIDINE GLUCONATE 1 APPLICATION(S): 213 SOLUTION TOPICAL at 12:16

## 2022-01-01 RX ADMIN — Medication 300 MILLIGRAM(S): at 12:13

## 2022-01-01 RX ADMIN — Medication 15 MILLILITER(S): at 05:49

## 2022-01-01 RX ADMIN — VENETOCLAX 400 MILLIGRAM(S): 100 TABLET, FILM COATED ORAL at 17:45

## 2022-01-01 RX ADMIN — Medication 650 MILLIGRAM(S): at 17:49

## 2022-01-01 RX ADMIN — DIPHENHYDRAMINE HYDROCHLORIDE AND LIDOCAINE HYDROCHLORIDE AND ALUMINUM HYDROXIDE AND MAGNESIUM HYDRO 30 MILLILITER(S): KIT at 06:33

## 2022-01-01 RX ADMIN — Medication 2: at 12:13

## 2022-01-01 RX ADMIN — DIPHENHYDRAMINE HYDROCHLORIDE AND LIDOCAINE HYDROCHLORIDE AND ALUMINUM HYDROXIDE AND MAGNESIUM HYDRO 30 MILLILITER(S): KIT at 00:27

## 2022-01-01 RX ADMIN — DIPHENHYDRAMINE HYDROCHLORIDE AND LIDOCAINE HYDROCHLORIDE AND ALUMINUM HYDROXIDE AND MAGNESIUM HYDRO 30 MILLILITER(S): KIT at 17:07

## 2022-01-01 RX ADMIN — Medication 15 MILLILITER(S): at 15:06

## 2022-01-01 RX ADMIN — Medication 2: at 12:09

## 2022-01-01 RX ADMIN — Medication 3 MILLILITER(S): at 19:02

## 2022-01-01 RX ADMIN — Medication 300 MILLIGRAM(S): at 11:06

## 2022-01-01 RX ADMIN — Medication 2: at 17:12

## 2022-01-01 RX ADMIN — DIPHENHYDRAMINE HYDROCHLORIDE AND LIDOCAINE HYDROCHLORIDE AND ALUMINUM HYDROXIDE AND MAGNESIUM HYDRO 30 MILLILITER(S): KIT at 12:18

## 2022-01-01 RX ADMIN — SODIUM CHLORIDE 75 MILLILITER(S): 9 INJECTION INTRAMUSCULAR; INTRAVENOUS; SUBCUTANEOUS at 06:17

## 2022-01-01 RX ADMIN — Medication 15 MILLILITER(S): at 21:51

## 2022-01-01 RX ADMIN — VENETOCLAX 100 MILLIGRAM(S): 100 TABLET, FILM COATED ORAL at 17:29

## 2022-01-01 RX ADMIN — SODIUM CHLORIDE 75 MILLILITER(S): 9 INJECTION INTRAMUSCULAR; INTRAVENOUS; SUBCUTANEOUS at 05:54

## 2022-01-01 RX ADMIN — ONDANSETRON 8 MILLIGRAM(S): 8 TABLET, FILM COATED ORAL at 15:59

## 2022-01-01 RX ADMIN — ONDANSETRON 8 MILLIGRAM(S): 8 TABLET, FILM COATED ORAL at 15:01

## 2022-01-01 RX ADMIN — SODIUM CHLORIDE 3 MILLILITER(S): 9 INJECTION INTRAMUSCULAR; INTRAVENOUS; SUBCUTANEOUS at 21:36

## 2022-01-01 RX ADMIN — DIPHENHYDRAMINE HYDROCHLORIDE AND LIDOCAINE HYDROCHLORIDE AND ALUMINUM HYDROXIDE AND MAGNESIUM HYDRO 30 MILLILITER(S): KIT at 12:10

## 2022-01-01 RX ADMIN — DIPHENHYDRAMINE HYDROCHLORIDE AND LIDOCAINE HYDROCHLORIDE AND ALUMINUM HYDROXIDE AND MAGNESIUM HYDRO 30 MILLILITER(S): KIT at 06:21

## 2022-01-01 RX ADMIN — SODIUM CHLORIDE 3 MILLILITER(S): 9 INJECTION INTRAMUSCULAR; INTRAVENOUS; SUBCUTANEOUS at 13:50

## 2022-01-01 RX ADMIN — Medication 25 GRAM(S): at 08:09

## 2022-01-01 RX ADMIN — VENETOCLAX 400 MILLIGRAM(S): 100 TABLET, FILM COATED ORAL at 17:17

## 2022-01-01 RX ADMIN — AZACITIDINE FOR 49.33 MILLIGRAM(S): 100 INJECTION, POWDER, LYOPHILIZED, FOR SOLUTION INTRAVENOUS; SUBCUTANEOUS at 15:28

## 2022-01-01 RX ADMIN — Medication 2: at 07:57

## 2022-01-01 RX ADMIN — SODIUM CHLORIDE 75 MILLILITER(S): 9 INJECTION INTRAMUSCULAR; INTRAVENOUS; SUBCUTANEOUS at 21:44

## 2022-01-01 RX ADMIN — Medication 20 MILLILITER(S): at 16:50

## 2022-01-01 RX ADMIN — Medication 15 MILLILITER(S): at 14:00

## 2022-01-01 RX ADMIN — HEPARIN SODIUM 5000 UNIT(S): 5000 INJECTION INTRAVENOUS; SUBCUTANEOUS at 16:49

## 2022-01-01 RX ADMIN — DIPHENHYDRAMINE HYDROCHLORIDE AND LIDOCAINE HYDROCHLORIDE AND ALUMINUM HYDROXIDE AND MAGNESIUM HYDRO 30 MILLILITER(S): KIT at 05:54

## 2022-01-01 RX ADMIN — Medication 300 MILLIGRAM(S): at 12:04

## 2022-01-01 RX ADMIN — Medication 20 MILLIGRAM(S): at 06:21

## 2022-01-01 RX ADMIN — DIPHENHYDRAMINE HYDROCHLORIDE AND LIDOCAINE HYDROCHLORIDE AND ALUMINUM HYDROXIDE AND MAGNESIUM HYDRO 30 MILLILITER(S): KIT at 12:04

## 2022-01-01 RX ADMIN — Medication 1: at 09:02

## 2022-01-01 RX ADMIN — Medication 2: at 08:28

## 2022-01-01 RX ADMIN — AZACITIDINE FOR 49.33 MILLIGRAM(S): 100 INJECTION, POWDER, LYOPHILIZED, FOR SOLUTION INTRAVENOUS; SUBCUTANEOUS at 15:52

## 2022-01-01 RX ADMIN — AZACITIDINE FOR 49.33 MILLIGRAM(S): 100 INJECTION, POWDER, LYOPHILIZED, FOR SOLUTION INTRAVENOUS; SUBCUTANEOUS at 16:35

## 2022-01-01 RX ADMIN — SODIUM CHLORIDE 75 MILLILITER(S): 9 INJECTION INTRAMUSCULAR; INTRAVENOUS; SUBCUTANEOUS at 13:30

## 2022-01-01 RX ADMIN — AZACITIDINE FOR 49.33 MILLIGRAM(S): 100 INJECTION, POWDER, LYOPHILIZED, FOR SOLUTION INTRAVENOUS; SUBCUTANEOUS at 16:34

## 2022-01-01 RX ADMIN — AZACITIDINE FOR 49.33 MILLIGRAM(S): 100 INJECTION, POWDER, LYOPHILIZED, FOR SOLUTION INTRAVENOUS; SUBCUTANEOUS at 15:57

## 2022-01-01 RX ADMIN — Medication 2: at 12:17

## 2022-01-01 RX ADMIN — SODIUM CHLORIDE 3 MILLILITER(S): 9 INJECTION INTRAMUSCULAR; INTRAVENOUS; SUBCUTANEOUS at 21:50

## 2022-01-01 RX ADMIN — DIPHENHYDRAMINE HYDROCHLORIDE AND LIDOCAINE HYDROCHLORIDE AND ALUMINUM HYDROXIDE AND MAGNESIUM HYDRO 30 MILLILITER(S): KIT at 11:06

## 2022-01-01 RX ADMIN — DIPHENHYDRAMINE HYDROCHLORIDE AND LIDOCAINE HYDROCHLORIDE AND ALUMINUM HYDROXIDE AND MAGNESIUM HYDRO 30 MILLILITER(S): KIT at 17:14

## 2022-01-01 RX ADMIN — Medication 20 MILLIGRAM(S): at 05:17

## 2022-01-01 RX ADMIN — ONDANSETRON 8 MILLIGRAM(S): 8 TABLET, FILM COATED ORAL at 15:49

## 2022-01-01 RX ADMIN — AZACITIDINE FOR 49.33 MILLIGRAM(S): 100 INJECTION, POWDER, LYOPHILIZED, FOR SOLUTION INTRAVENOUS; SUBCUTANEOUS at 16:36

## 2022-01-01 RX ADMIN — AZACITIDINE FOR 49.33 MILLIGRAM(S): 100 INJECTION, POWDER, LYOPHILIZED, FOR SOLUTION INTRAVENOUS; SUBCUTANEOUS at 15:50

## 2022-01-01 RX ADMIN — Medication 300 MILLIGRAM(S): at 12:18

## 2022-01-01 RX ADMIN — Medication 20 MILLIGRAM(S): at 06:04

## 2022-01-01 RX ADMIN — DIPHENHYDRAMINE HYDROCHLORIDE AND LIDOCAINE HYDROCHLORIDE AND ALUMINUM HYDROXIDE AND MAGNESIUM HYDRO 30 MILLILITER(S): KIT at 05:49

## 2022-01-01 RX ADMIN — Medication 20 MILLIGRAM(S): at 09:23

## 2022-01-01 RX ADMIN — Medication 650 MILLIGRAM(S): at 08:33

## 2022-01-01 RX ADMIN — DIPHENHYDRAMINE HYDROCHLORIDE AND LIDOCAINE HYDROCHLORIDE AND ALUMINUM HYDROXIDE AND MAGNESIUM HYDRO 30 MILLILITER(S): KIT at 12:14

## 2022-01-01 RX ADMIN — VENETOCLAX 400 MILLIGRAM(S): 100 TABLET, FILM COATED ORAL at 17:12

## 2022-01-01 RX ADMIN — DIPHENHYDRAMINE HYDROCHLORIDE AND LIDOCAINE HYDROCHLORIDE AND ALUMINUM HYDROXIDE AND MAGNESIUM HYDRO 30 MILLILITER(S): KIT at 11:50

## 2022-01-01 RX ADMIN — SODIUM CHLORIDE 3 MILLILITER(S): 9 INJECTION INTRAMUSCULAR; INTRAVENOUS; SUBCUTANEOUS at 06:32

## 2022-01-01 RX ADMIN — Medication 2: at 07:59

## 2022-01-01 RX ADMIN — ONDANSETRON 8 MILLIGRAM(S): 8 TABLET, FILM COATED ORAL at 15:44

## 2022-01-01 RX ADMIN — DIPHENHYDRAMINE HYDROCHLORIDE AND LIDOCAINE HYDROCHLORIDE AND ALUMINUM HYDROXIDE AND MAGNESIUM HYDRO 30 MILLILITER(S): KIT at 23:44

## 2022-01-01 RX ADMIN — Medication 15 MILLILITER(S): at 21:27

## 2022-01-01 RX ADMIN — Medication 2: at 17:35

## 2022-01-01 RX ADMIN — Medication 1: at 13:34

## 2022-01-01 RX ADMIN — Medication 15 MILLILITER(S): at 22:13

## 2022-01-01 RX ADMIN — Medication 300 MILLIGRAM(S): at 11:49

## 2022-01-01 RX ADMIN — SODIUM CHLORIDE 3 MILLILITER(S): 9 INJECTION INTRAMUSCULAR; INTRAVENOUS; SUBCUTANEOUS at 21:25

## 2022-01-01 RX ADMIN — Medication 15 MILLILITER(S): at 06:52

## 2022-01-01 RX ADMIN — AZACITIDINE FOR 49.33 MILLIGRAM(S): 100 INJECTION, POWDER, LYOPHILIZED, FOR SOLUTION INTRAVENOUS; SUBCUTANEOUS at 15:47

## 2022-01-01 RX ADMIN — Medication 15 MILLILITER(S): at 14:11

## 2022-01-01 RX ADMIN — DIPHENHYDRAMINE HYDROCHLORIDE AND LIDOCAINE HYDROCHLORIDE AND ALUMINUM HYDROXIDE AND MAGNESIUM HYDRO 30 MILLILITER(S): KIT at 11:09

## 2022-01-01 RX ADMIN — AZACITIDINE FOR 49.33 MILLIGRAM(S): 100 INJECTION, POWDER, LYOPHILIZED, FOR SOLUTION INTRAVENOUS; SUBCUTANEOUS at 15:45

## 2022-01-01 RX ADMIN — Medication 15 MILLILITER(S): at 21:44

## 2022-01-01 RX ADMIN — Medication 15 MILLILITER(S): at 13:27

## 2022-01-01 RX ADMIN — AZACITIDINE FOR 49.33 MILLIGRAM(S): 100 INJECTION, POWDER, LYOPHILIZED, FOR SOLUTION INTRAVENOUS; SUBCUTANEOUS at 16:15

## 2022-01-01 RX ADMIN — CHLORHEXIDINE GLUCONATE 1 APPLICATION(S): 213 SOLUTION TOPICAL at 12:10

## 2022-01-01 RX ADMIN — IRON SUCROSE 265 MILLIGRAM(S): 20 INJECTION, SOLUTION INTRAVENOUS at 19:51

## 2022-01-01 RX ADMIN — SODIUM CHLORIDE 3 MILLILITER(S): 9 INJECTION INTRAMUSCULAR; INTRAVENOUS; SUBCUTANEOUS at 06:52

## 2022-01-01 RX ADMIN — SODIUM CHLORIDE 75 MILLILITER(S): 9 INJECTION INTRAMUSCULAR; INTRAVENOUS; SUBCUTANEOUS at 05:49

## 2022-01-01 RX ADMIN — AZACITIDINE FOR 49.33 MILLIGRAM(S): 100 INJECTION, POWDER, LYOPHILIZED, FOR SOLUTION INTRAVENOUS; SUBCUTANEOUS at 15:48

## 2022-01-01 RX ADMIN — DIPHENHYDRAMINE HYDROCHLORIDE AND LIDOCAINE HYDROCHLORIDE AND ALUMINUM HYDROXIDE AND MAGNESIUM HYDRO 30 MILLILITER(S): KIT at 17:36

## 2022-01-01 RX ADMIN — Medication 15 MILLILITER(S): at 06:21

## 2022-01-01 RX ADMIN — AZACITIDINE FOR 49.33 MILLIGRAM(S): 100 INJECTION, POWDER, LYOPHILIZED, FOR SOLUTION INTRAVENOUS; SUBCUTANEOUS at 16:00

## 2022-01-01 RX ADMIN — AZACITIDINE FOR 49.33 MILLIGRAM(S): 100 INJECTION, POWDER, LYOPHILIZED, FOR SOLUTION INTRAVENOUS; SUBCUTANEOUS at 15:53

## 2022-01-01 RX ADMIN — Medication 25 GRAM(S): at 08:18

## 2022-01-01 RX ADMIN — AZACITIDINE FOR 49.33 MILLIGRAM(S): 100 INJECTION, POWDER, LYOPHILIZED, FOR SOLUTION INTRAVENOUS; SUBCUTANEOUS at 15:27

## 2022-01-01 RX ADMIN — Medication 650 MILLIGRAM(S): at 16:48

## 2022-01-01 RX ADMIN — SODIUM CHLORIDE 75 MILLILITER(S): 9 INJECTION INTRAMUSCULAR; INTRAVENOUS; SUBCUTANEOUS at 05:19

## 2022-01-01 RX ADMIN — Medication 2: at 12:15

## 2022-01-01 RX ADMIN — DIPHENHYDRAMINE HYDROCHLORIDE AND LIDOCAINE HYDROCHLORIDE AND ALUMINUM HYDROXIDE AND MAGNESIUM HYDRO 30 MILLILITER(S): KIT at 05:16

## 2022-01-01 RX ADMIN — Medication 2: at 17:29

## 2022-01-01 RX ADMIN — Medication 15 MILLILITER(S): at 06:16

## 2022-01-01 RX ADMIN — DIPHENHYDRAMINE HYDROCHLORIDE AND LIDOCAINE HYDROCHLORIDE AND ALUMINUM HYDROXIDE AND MAGNESIUM HYDRO 30 MILLILITER(S): KIT at 23:50

## 2022-01-01 RX ADMIN — SODIUM CHLORIDE 3 MILLILITER(S): 9 INJECTION INTRAMUSCULAR; INTRAVENOUS; SUBCUTANEOUS at 06:14

## 2022-01-01 RX ADMIN — SODIUM CHLORIDE 3 MILLILITER(S): 9 INJECTION INTRAMUSCULAR; INTRAVENOUS; SUBCUTANEOUS at 21:41

## 2022-11-28 NOTE — ED PROVIDER NOTE - NSICDXPASTMEDICALHX_GEN_ALL_CORE_FT
PAST MEDICAL HISTORY:  DM (diabetes mellitus)     HLD (hyperlipidemia)     HTN (hypertension)     Iron deficiency anemia

## 2022-11-28 NOTE — ED PROVIDER NOTE - OBJECTIVE STATEMENT
72 year old male with PMH DM, HTN, HLD, iron deficiency anemia presents with 3 weeks of dyspnea on exertion, fatigue, loss of appetite. Denies chest pain, shortness of breath at rest, fever, chills, cough, congestion, abdominal pain, nausea, vomiting, diarrhea, blood in stools. Had colonoscopy 1 year ago that was normal. Recent trip to [facility]. Has been taking iron supplements prescribed by PCP. Last hemoglobin 1 month ago was 8.0 at PCP office.

## 2022-11-28 NOTE — ED CDU PROVIDER INITIAL DAY NOTE - NSCAREINITIATED _GEN_ER
Alert-The patient is alert, awake and responds to voice. The patient is oriented to time, place, and person. The triage nurse is able to obtain subjective information.
Zoran Tillman(Attending)

## 2022-11-28 NOTE — ED PROVIDER NOTE - ATTENDING CONTRIBUTION TO CARE
------------ATTENDING NOTE------------  pt c/o 2 wks of gradually increasing dyspnea on exertion, describing increased fatigue, sob, occasional mild dull diffuse chest ache w/ exertion, no cough, has asthma but no wheezing, no recent fevers/illness, no edema/calf tenderness, comfortable at rest, will trial duoneb, plan CDU for cardiac monitoring, TTE, close reassessments, optimize medical mgmt and outpt needs w/ pt's primary team.  - Kaveh Grande MD   -------------------------------------------------

## 2022-11-28 NOTE — ED PROVIDER NOTE - CLINICAL SUMMARY MEDICAL DECISION MAKING FREE TEXT BOX
Magy Nassar DO PGY-2  72 year old male with PMH DM, HTN, HLD, iron deficiency anemia presents with 3 weeks of dyspnea on exertion, fatigue, loss of appetite. Had colonoscopy 1 year ago that was normal. Recent trip to [facility]. Has been taking iron supplements prescribed by PCP. Last hemoglobin 1 month ago was 8.0 at PCP office. Will obtain labs, CXR, EKG, and re-evaluate for dispo. Magy Nassar DO PGY-2  72 year old male with PMH DM, HTN, HLD, iron deficiency anemia presents with 3 weeks of dyspnea on exertion, fatigue, loss of appetite. Had colonoscopy 1 year ago that was normal. Recent trip to [facility]. Has been taking iron supplements prescribed by PCP. Last hemoglobin 1 month ago was 8.0 at PCP office. Concern for infection, ACS, hematologic/electrolyte abnormalities. Will obtain labs, CXR, EKG, and re-evaluate for dispo.

## 2022-11-28 NOTE — ED PROVIDER NOTE - PHYSICAL EXAMINATION
PHYSICAL EXAM:  CONSTITUTIONAL: Well appearing, awake, alert, oriented to person, place, time/situation and in no apparent distress.  HEAD: Atraumatic  EYES: Clear bilaterally, pupils equal, round and reactive to light.  ENMT: Airway patent, Nasal mucosa clear. Mouth with normal mucosa. Uvula is midline.   CARDIAC: Normal rate, regular rhythm. +S1/S2. No murmurs, rubs or gallops.  RESPIRATORY: Breathing unlabored. Breath sounds clear and equal bilaterally.  ABDOMEN:  Soft, nontender, nondistended. No rebound tenderness or guarding.  NEUROLOGICAL: Alert and oriented, no focal deficits, no motor or sensory deficits.   MSK: No clubbing, cyanosis, or edema. Full range of motion of all extremities. No tenderness to palpation. No midline or paraspinal tenderness. No spinal step-offs.  SKIN: Skin warm and dry. No evidence of rashes or lesions.

## 2022-11-28 NOTE — ED CDU PROVIDER INITIAL DAY NOTE - PROGRESS NOTE DETAILS
CDU PROGRESS NOTE PALLAVI VICTOR: c/d/w Hematology fellow, recommend additional labs for anemia workup, team will see patient for formal consult.

## 2022-11-28 NOTE — ED CDU PROVIDER INITIAL DAY NOTE - DETAILS
72 year old male with PMH DM, HTN, HLD, iron deficiency anemia presents with 3 weeks of dyspnea on exertion, fatigue. Pt found to be anemic and thrombocytopenic on lab  Patient was placed in Observation for their abnormal labs.   Treatment plan   Patient reassessments and vitals q 4hrs   Medical management of co-morbidities using home medications   for continued monitoring and treatment of anemia/thrombocytopenia, Iron infusion, Hematology consult

## 2022-11-28 NOTE — ED CDU PROVIDER DISPOSITION NOTE - NS ED MD DISPO ADMITTING SERVICE
LAYLA 2/2 ATN 2/2 polymyxin and amikacin.  Renal function improving  Continue IVF NS @100ml/hr for now. LAYLA 2/2 ATN 2/2 polymyxin and amikacin.  Renal function improving.  Continue IVF NS @100ml/hr for now. MED

## 2022-11-28 NOTE — ED CDU PROVIDER DISPOSITION NOTE - CLINICAL COURSE
72 year old male with PMH DM, HTN, HLD, iron deficiency anemia presents with 3 weeks of dyspnea on exertion, fatigue, loss of appetite. Denies chest pain, shortness of breath at rest, fever, chills, cough, congestion, abdominal pain, nausea, vomiting, diarrhea, blood in stools. Had colonoscopy 1 year ago that was normal. Recent trip to [facility]. Has been taking iron supplements prescribed by PCP.  In ED, patient had laboratory significant for anemia (h/h 7.9/23.5) and thrombocytopenia (plt 57). Outpatient labs 08/2022 h/h 12.3/35.6 and plt 216. Outpatient labs 10/2022 h/h 10.7/31.2 and plt 193. Pt was started on Iron infusion by ED and sent to CDU for continued monitoring and treatment of anemia/thrombocytopenia, Iron infusion, Hematology consult. 72 year old male with PMH DM, HTN, HLD, iron deficiency anemia presents with 3 weeks of dyspnea on exertion, fatigue, loss of appetite. Denies chest pain, shortness of breath at rest, fever, chills, cough, congestion, abdominal pain, nausea, vomiting, diarrhea, blood in stools. Had colonoscopy 1 year ago that was normal. Recent trip to [facility]. Has been taking iron supplements prescribed by PCP.  In ED, patient had laboratory significant for anemia (h/h 7.9/23.5) and thrombocytopenia (plt 57). Outpatient labs 08/2022 h/h 12.3/35.6 and plt 216. Outpatient labs 10/2022 h/h 10.7/31.2 and plt 193. Pt was started on Iron infusion by ED and sent to CDU for continued monitoring and treatment of anemia/thrombocytopenia, Iron infusion, Hematology consult.  In CDU, patient w/ downtrending H/H from 7.9/23.5 to 6.7/20.4. c/d/w Dr. Brito, Pt consented for one unit blood transfusion. Plan to admit to Medicine for further evaluation of unspecified anemia/thrombocytopenia and Hematology recs.

## 2022-11-28 NOTE — ED PROVIDER NOTE - RAPID ASSESSMENT
Attending (Zoran Tillman D.O.):   72M hx of iron def anemia, here for 3 weeks of ayon, fatigue, loss of appetite. Came back from [facility] 3 days ago (was there for 10 days). Denies infectious sxs. Denies abd pain, chest pain, shortness of breath. Not on antiplt or anticoags. Denies bright red or dark stools. Denies weight gain or leg swelling.    *** I, Zoran Tillman D.O., performed an initial face to face bedside interview with this patient regarding history of present illness and determined that the patient should be evaluated in the main ED. A physical exam was not performed due to private space availability. This patient's evaluation is NOT COMPLETE and only preliminary. The full assessment, management, and reassessment of this patient, including but not limited to the follow up of ordered laboratory and radiologic testing, is deferred to the main ED provider. ***

## 2022-11-28 NOTE — ED PROVIDER NOTE - CARE PLAN
1 Principal Discharge DX:	Fatigue   Principal Discharge DX:	Dyspnea on exertion   Principal Discharge DX:	Dyspnea on exertion  Secondary Diagnosis:	Chest pain of uncertain etiology

## 2022-11-28 NOTE — ED ADULT TRIAGE NOTE - CHIEF COMPLAINT QUOTE
sob, fatigue 1 week. Known having low hemoglobin. Currently on Iron supplements. Denies signs of bleeding, fever, dizziness.

## 2022-11-28 NOTE — ED CDU PROVIDER DISPOSITION NOTE - NSFOLLOWUPINSTRUCTIONS_ED_ALL_ED_FT
(1) You will need to follow-up with your PMD () in 2-3 days for your anemia. A copy of your results were given to you to bring to your appt.  (2) Read attached discharge paperwork.  (3) Drink plenty of fluids to stay hydrated.  (4) Return to ER for lightheaded/dizziness, chest pain, palpitations, shortness of breath, active bleeding, or any other concerns.

## 2022-11-28 NOTE — ED CDU PROVIDER INITIAL DAY NOTE - OBJECTIVE STATEMENT
72 year old male with PMH DM, HTN, HLD, iron deficiency anemia presents with 3 weeks of dyspnea on exertion, fatigue, loss of appetite. Denies chest pain, shortness of breath at rest, fever, chills, cough, congestion, abdominal pain, nausea, vomiting, diarrhea, blood in stools. Had colonoscopy 1 year ago that was normal. Recent trip to [facility]. Has been taking iron supplements prescribed by PCP.  In ED, patient had laboratory significant for anemia (h/h 7.9/23.5) and thrombocytopenia (plt 57). Outpatient labs 08/2022 h/h 12.3/35.6 and plt 216. Outpatient labs 10/2022 h/h 10.7/31.2 and plt 193. Pt was started on Iron infusion by ED and sent to CDU for continued monitoring and treatment of anemia/thrombocytopenia, Iron infusion, Hematology consult.

## 2022-11-28 NOTE — CHART NOTE - NSCHARTNOTEFT_GEN_A_CORE
Called by ED given anemia and thrombocytopenia.  Patient reportedly presenting with 3 weeks of dyspnea on exertion, not dyspneic at rest.  Per ED, last colonoscopy was about a year ago, and last fecal occult about 2-3 months ago was negative.  Patient is on PO iron supplementation.  Per ED, in August 2022, H/H was 12.3/35.6 and platelet was 216 --> October 2022 H/H was 10.7/31.2 and plt 193 --> now H/H 7.9/23.5 with plt 57.  LDH is normal, along with non-elevated reticulocyte count (in fact, inappropriately normal) and normal bilirubin. Haptoglobin pending. Therefore, low suspicion of hemolysis, including TTP. No fever, renal failure, or mental status changes. Minimally elevated INR/PT with normal fibrinogen, also lower suspicion of DIC.  However, sending peripheral smear and will review first thing in AM.  Also checking repeat CBC tonight to follow trend.  Patient being given IV iron x1, but checking iron studies, including ferritin.  Check B12 and folate.  Also discussed with ED to check Dax.  Would check abdominal ultrasound to better evaluate for hepatic disease and hepatosplenomegaly.  Will write full consult note in AM.      Aryles Hedjar, MD, PGY-5  Hematology/Oncology Fellow  [facility]  Pager: [phone]  After 5PM and on weekends and holidays, please call the inpatient fellow on call.

## 2022-11-28 NOTE — ED PROVIDER NOTE - DISPOSITION TYPE
Impression: Type 2 diabetes mellitus with mild nonproliferative diabetic retinopathy with macular edema, bilateral: V91.1835. Plan: Exam/photos/OCT show DBH c/w mild/mod NPDR with non-central CSDME OU. FA with sweeps 6/28/21 show staining of macular MAs with late leakage, good peripheral flow without ischemia/NVE. Discussed the Dx and NHx of NPDR and DME at length. Reviewed options of observation, anti-VEGF, steroids, and laser; recommend focal laser OD/OS. The importance of blood sugar, blood pressure, and cholesterol control and their relationship to progression of diabetic retinopathy were reviewed. 

1-2 weeks, OCT/focal laser OD; then 1-2 weeks, OCT/focal laser OS (PHX-B) OBSERVATION

## 2022-11-28 NOTE — ED ADULT NURSE NOTE - OBJECTIVE STATEMENT
71y/o M coming to the ED c.o SOB. Pt states that over the past x2 weeks hes been feeling more fatigued, decreased appetite, & dizziness when walking. Pt states had blood work on x1 month ago and was told hemoglobin was low, never needed blood transfusion. 71y/o M coming to the ED c.o SOB. Pt states that over the past x2 weeks hes been feeling more fatigued, decreased appetite, & dizziness when walking. Pt states had blood work on x1 month ago and was told hemoglobin was low, never needed blood transfusion. Pt denies any blood in urine/stool. On exam, pt is breathing spontaneously, able to speak full sentences w/o difficulty, saturating 100% RA. ABdomen soft, nontender, nondistended. IV placed by QDOC RN. JIMI.

## 2022-11-29 NOTE — H&P ADULT - PROBLEM SELECTOR PLAN 1
Presenting with fatigue, poor po intake and exertional dyspnea found to be grossly pancytopenic. Hemolysis labs are neg(retic wnl, LDH wnl, pending retic, but tbili wnl as well). Normal fibrinogen. Dax neg    -US abd revealed no indications for hepatic dz  -f/u outpatient records from pcp  -heme following recs appreciated  -transfuse hemoglobin<7 and platelets<10(febrile<20, bleeding<50)  -pending blood smear  -may need imaging  -f/u Ua regarding hematuria Presenting with fatigue, poor po intake and exertional dyspnea found to be grossly pancytopenic. Hemolysis labs are neg(retic wnl, LDH wnl, pending retic, but tbili wnl as well). Normal fibrinogen. Dax neg    -long standing etoh(2 beers per day use) with elevated coags and low platelets but normal albumin  -recent travel to [facility]   -US abd revealed no indications for hepatic dz  -f/u outpatient records from pcp  -heme following recs appreciated  -transfuse hemoglobin<7 and platelets<10(febrile<20, bleeding<50)  -pending blood smear  -may need imaging  -f/u Ua regarding hematuria Presenting with fatigue, poor po intake and exertional dyspnea found to be grossly pancytopenic. Hemolysis labs are neg(retic wnl, LDH wnl, pending retic, but tbili wnl as well). Normal fibrinogen. Dax neg    -long standing etoh(2 beers per day use) with elevated coags and low platelets but normal albumin  -recent travel to [facility]   -no new drugs, no recent infections, no LAD  -US abd revealed no indications for hepatic dz  -f/u outpatient records from pcp  -heme following recs appreciated  -transfuse hemoglobin<7 and platelets<10(febrile<20, bleeding<50)  -pending blood smear  -may need imaging  -f/u Ua regarding hematuria, folate, b12, and HIV Presenting with fatigue, poor po intake and exertional dyspnea found to be grossly pancytopenic. Hemolysis labs are neg(retic wnl, LDH wnl, pending retic, but tbili wnl as well). Normal fibrinogen. Dax neg. May be 2/2 decr production    -long standing etoh(2 beers per day use) with elevated coags and low platelets but normal albumin  -recent travel to [facility]   -no new drugs, no recent infections, no LAD  -US abd revealed no indications for hepatic dz  -f/u outpatient records from pcp  -heme following recs appreciated  -transfuse hemoglobin<7 and platelets<10(febrile<20, bleeding<50)  -pending blood smear  -f/u Ua regarding hematuria, folate, b12, and HIV Presenting with fatigue, poor po intake and exertional dyspnea found to be grossly pancytopenic. Hemolysis labs are neg(retic wnl, LDH wnl, pending retic, but tbili wnl as well). Normal fibrinogen. Dax neg    -long standing etoh(2 beers per day use) with elevated coags and low platelets but normal albumin  -recent travel to [facility]   -no new drugs, no recent infections, no LAD  -US abd revealed no indications for hepatic dz  -heme following recs appreciated  -transfuse hemoglobin<7 and platelets<10 (febrile<20, bleeding<50)  -pending blood smear  -f/u Ua regarding hematuria, folate, b12, and HIV

## 2022-11-29 NOTE — H&P ADULT - PROBLEM SELECTOR PLAN 3
at home on enalapril 20  -hold for now given ok pressures at home on enalapril 20, continue with home medications

## 2022-11-29 NOTE — ED CDU PROVIDER SUBSEQUENT DAY NOTE - HISTORY
CDU PROGRESS NOTE PALLAVI VICTOR: Pt placed in CDU for further workup and management of anemia/thrombocytopenia. In CDU, patient w/ downtrending H/H from 7.9/23.5 to 6.7/20.4. c/d/w Dr. Brito, Pt consented for one unit blood transfusion. Plan to admit to Medicine for further evaluation of unspecified anemia/thrombocytopenia and Hematology recs. CDU PROGRESS NOTE PALLAVI VICTOR: Pt placed in CDU for further workup and management of anemia/thrombocytopenia. Pt was transfused Iron, but heme labs were sent prior to infusion. In CDU, patient w/ downtrending H/H from 7.9/23.5 to 6.7/20.4. c/d/w Dr. Brito, Pt consented for one unit blood transfusion. Plan to admit to Medicine for further evaluation of unspecified anemia/thrombocytopenia and Hematology recs.

## 2022-11-29 NOTE — PROCEDURE NOTE - ADDITIONAL PROCEDURE DETAILS
Sterile preparation of site with iodine, draped to expose aspirate/biopsy area, local anesthesia with 2% lidocaine (approximately 10ml), frequent pressure application on incision to maintain hemostasis.      Tissue obtained: bone marrow aspirate and biopsy were successfully obtained in sterile manner.

## 2022-11-29 NOTE — H&P ADULT - ASSESSMENT
72M w/PMHx of T2DM, HTN, HLD presenting with 3 weeks of fatigue, poor po intake and exertional dyspnea found to be pancytopenic 72M w/PMHx of T2DM, HTN, HLD, iron deficiency anemia  presenting with 3 weeks of fatigue, poor po intake and exertional dyspnea found to be pancytopenic

## 2022-11-29 NOTE — PROGRESS NOTE ADULT - PROBLEM SELECTOR PLAN 1
Presenting with fatigue, poor po intake and exertional dyspnea found to be grossly pancytopenic. Hemolysis labs are neg(retic wnl, LDH wnl, pending retic, but tbili wnl as well). Normal fibrinogen. Dax neg  - long standing etoh(2 beers per day use) with elevated coags and low platelets but normal albumin  - recent travel to [facility], no new meds, no recent infections  - US abd revealed no hepatic or splenic pathology  - transfuse hemoglobin<7 and platelets<10 (febrile<20, bleeding<50)  - s/p 1u pRBC overnight for Hb 6.7  - f/u peripheral smear  - f/u UA, folate, b12, HIV  - heme following Presenting with fatigue, poor po intake and exertional dyspnea found to be grossly pancytopenic. Hemolysis labs are neg(retic wnl, LDH wnl, pending retic, but tbili wnl as well). Normal fibrinogen. Dax neg  - long standing etoh(2 beers per day use) with elevated coags and low platelets but normal albumin  - recent travel to [facility], no new meds, no recent infections  - US abd revealed no hepatic or splenic pathology  - transfuse hemoglobin<7 and platelets<10 (febrile<20, bleeding<50)  - s/p 1u pRBC overnight for Hb 6.7 -> 8.7  - f/u peripheral smear  - f/u UA, folate, b12, HIV  - heme following

## 2022-11-29 NOTE — CONSULT NOTE ADULT - ASSESSMENT
71 yo M with a PMH of DM2, HTN, and HLD who presents with 3 weeks of fatigue, poor PO intake and exertional dyspnea, found to have bi-cytopenia.    #Bi-Cytopenia  - Patient developing ZAPATA and new anemia/thrombocytopenia over the past couple months  - Hb 12.3 (08/2022) --> 10.7 (10/2022) --> 7.9 (admission)  - Plts 216 (08/2022) --> 193 (10/2022) --> 57 (admission)  - Patient has been on PO iron outpatient and received iron sucrose in ED  - Iron studies show normal iron with TIBC (pre-transfusion) but ferritin markedly elevated at 1218  - B12 and folate normal, and labs not c/w hemolysis. Reticulocyte count inappropriately normal  - Patient uses alcohol but no evidence of liver disease, s/p US showing no HSM  - Peripheral smear personally reviewed and interpreted, showing slightly microcytic and hypochromic cells, occasional teardrop cells, rare platelet clumps, but no schistocytes  - S/p 1U PRBCs in ED  - Will plan for bone marrow biopsy today to r/o intrinsic marrow process. Patient is in agreement  - Transfuse to Hb > 7 (8 if bleeding) and platelets > 10 (20 if febrile and 50 if bleeding)    Aryles Hedjar, MD, PGY-5  Hematology/Oncology Fellow  [facility]  Pager: [phone]  After 5PM and on weekends and holidays, please call the inpatient fellow on call.

## 2022-11-29 NOTE — PROGRESS NOTE ADULT - PROBLEM SELECTOR PLAN 2
Recent exertional dyspnea likely 2/2 anemia but will r/o other etiologies   -trops neg, EKG NSR w/ no ST or T wave abnormalities  -CXR appear clear  -f/u TTE Recent exertional dyspnea likely 2/2 anemia but will r/o other etiologies   - trops neg, EKG NSR w/ no ST or T wave abnormalities  - CXR appear clear  - f/u TTE

## 2022-11-29 NOTE — ED ADULT NURSE REASSESSMENT NOTE - NS ED NURSE REASSESS COMMENT FT1
1 unit PRBC initiated. Consent in chart. Risks and benefits explained to patient. Patient verbalized understanding of risks and benefits. Patient aware of possible side effects, call bell within reach. Vital signs stable. Second RN at bedside for confirmation.

## 2022-11-29 NOTE — H&P ADULT - NSHPLABSRESULTS_GEN_ALL_CORE
6.7    3.40  )-----------( 61       ( 29 Nov 2022 00:53 )             20.4   11-28    139  |  99  |  17  ----------------------------<  118<H>  4.6   |  25  |  0.97    Ca    9.6      28 Nov 2022 15:43  Mg     1.5     11-28    TPro  7.9  /  Alb  4.4  /  TBili  0.8  /  DBili  x   /  AST  22  /  ALT  30  /  AlkPhos  86  11-28 Personally reviewed labs, imaging and EKG    6.7    3.40  )-----------( 61       ( 29 Nov 2022 00:53 )             20.4   11-28    139  |  99  |  17  ----------------------------<  118<H>  4.6   |  25  |  0.97    Ca    9.6      28 Nov 2022 15:43  Mg     1.5     11-28    TPro  7.9  /  Alb  4.4  /  TBili  0.8  /  DBili  x   /  AST  22  /  ALT  30  /  AlkPhos  86  11-28

## 2022-11-29 NOTE — ED CDU PROVIDER SUBSEQUENT DAY NOTE - PROGRESS NOTE DETAILS
CDU PROGRESS NOTE PALLAVI VICTOR: Pt returned from US abdomen. Admitted to Medicine. Hematology was paged again to discuss repeat CBC, but no call back. Medicine to f/u.

## 2022-11-29 NOTE — ED CDU PROVIDER SUBSEQUENT DAY NOTE - MEDICAL DECISION MAKING DETAILS
Dr. Brito (Attending Physician)  Pt. pw new anemia dropped h/h while in CDU. Will give 1 unit RBC and admit for anemia work-up. Iron studies sent.

## 2022-11-29 NOTE — PROGRESS NOTE ADULT - PROBLEM SELECTOR PLAN 5
-at home on metformin 1g bid  -hold metformin  -ISS  -f/u a1c - at home on metformin 1g bid  - hold metformin  - ISS  - f/u a1c

## 2022-11-29 NOTE — H&P ADULT - ATTENDING COMMENTS
71yo M presents w/ dyspnea and fatigue, found to be pancytopenic, according to prior notes pt Hg/Hct and Plt have been downtrending since 8/2022, unclear etiology at this point, he has known Fe deficiency anemia but iron studies today were within normal limits, other lab work obtained so far has been unrevealing, appreciate hematology recommendations, obtain fecal occult and additional lab work, abdominal ultrasound was unremarkable, if no obvious cause identified may need to consider bone marrow biopsy if recommended by hematology, rest of plan as outlined above

## 2022-11-29 NOTE — H&P ADULT - NSHPPHYSICALEXAM_GEN_ALL_CORE
T(C): 37.1 (11-29-22 @ 03:15), Max: 37.2 (11-28-22 @ 20:03)  T(F): 98.8 (11-29-22 @ 03:15), Max: 99 (11-28-22 @ 20:03)  HR: 90 (11-29-22 @ 03:15) (77 - 100)  BP: 135/83 (11-29-22 @ 03:15) (112/69 - 135/83)  RR: 19 (11-29-22 @ 03:15) (17 - 20)  SpO2: 100% (11-29-22 @ 03:15) (99% - 100%)  Wt(kg): --    GENERAL: NAD, well-developed  HEENT:  Atraumatic, Normocephalic, EOMI, PERRLA, conjunctiva and sclera clear, oral mucosa moist, clear w/o any exudate   NECK: Supple, No JVD, no LAD  CHEST/LUNG: Clear to auscultation bilaterally; No wheeze  HEART: RRR; No murmurs, rubs, or gallops  ABDOMEN: Soft, Nontender, Nondistended; Bowel sounds present  EXTREMITIES:  2+ Peripheral Pulses, No clubbing, cyanosis, or edema  PSYCH: AAOx3, normal affect   NEUROLOGY: non-focal, moving all extremities  SKIN: No rashes or lesions T(C): 37.1 (11-29-22 @ 03:15), Max: 37.2 (11-28-22 @ 20:03)  T(F): 98.8 (11-29-22 @ 03:15), Max: 99 (11-28-22 @ 20:03)  HR: 90 (11-29-22 @ 03:15) (77 - 100)  BP: 135/83 (11-29-22 @ 03:15) (112/69 - 135/83)  RR: 19 (11-29-22 @ 03:15) (17 - 20)  SpO2: 100% (11-29-22 @ 03:15) (99% - 100%)  Wt(kg): --    constitutional: NAD, well-developed  Eyes: EOMI, PERRLA, conjunctiva and sclera clear   ENMT: oral mucosa moist, clear w/o any exudate   NECK: Supple, No JVD, no LAD  resp: Clear to auscultation bilaterally; No wheeze  cv: RRR; No murmurs, rubs, or gallops  gi: Soft, Nontender, Nondistended; Bowel sounds present  EXTREMITIES:  2+ Peripheral Pulses, No clubbing, cyanosis, or edema  PSYCH: AAOx3, normal affect   NEUROLOGY: non-focal, moving all extremities  SKIN: No rashes or lesions

## 2022-11-29 NOTE — H&P ADULT - NSHPSOCIALHISTORY_GEN_ALL_CORE
LIves at home w/niece. NO smoke or recreational drug use hx. Drank 2 beers a day until 2 weeks ago. Lives at home w/niece. NO smoke or recreational drug use hx. Drank 2 beers a day until 2 weeks ago.

## 2022-11-29 NOTE — H&P ADULT - HISTORY OF PRESENT ILLNESS
72M w/PMHx of T2DM, HTN, HLD presenting with 3 weeks of fatigue, poor po intake and exertional dyspnea. Per patient the symptoms presented all of a sudden and the dyspnea has worsened. Otherwise no f/c, cough/congestion, cp, abd pain, diarrhea, burning w/urination/dysuria. Does not endorse having any nose bleeds, vomiting blood, hematuria or blood in stool.     ED: got 300mg iron sucrose, duoneb 1x and 1u pRBC 72M w/PMHx of T2DM, HTN, HLD, iron deficiency anemia presenting with 3 weeks of fatigue, poor po intake and exertional dyspnea. Per patient the symptoms presented all of a sudden and the dyspnea has worsened. Otherwise no f/c, cough/congestion, cp, abd pain, diarrhea, burning w/urination/dysuria. Does not endorse having any nose bleeds, vomiting blood, hematuria or blood in stool.     ED: got 300mg iron sucrose, duoneb 1x and 1u pRBC

## 2022-11-29 NOTE — CONSULT NOTE ADULT - SUBJECTIVE AND OBJECTIVE BOX
71 yo M with a PMH of DM2, HTN, and HLD who presents with 3 weeks of fatigue, poor PO intake and exertional dyspnea. Per patient the symptoms presented all of a sudden and the dyspnea has worsened. He denies symptoms at rest, CP, fevers/chills, cough/congestion, abdominal pain, diarrhea, dysuria, edema, or rash. He denies bleeding from the nose, stool, or urine. He denies a prior history of anemia or personal or family history of blood disorders. He acknowledges losing weight over the past few weeks but denies recent illness.        Allergies    No Known Allergies    Intolerances        MEDICATIONS  (STANDING):  atorvastatin 40 milliGRAM(s) Oral at bedtime  dextrose 5%. 1000 milliLiter(s) (50 mL/Hr) IV Continuous <Continuous>  dextrose 50% Injectable 25 Gram(s) IV Push once  enalapril 20 milliGRAM(s) Oral daily  glucagon  Injectable 1 milliGRAM(s) IntraMuscular once  insulin lispro (ADMELOG) corrective regimen sliding scale   SubCutaneous three times a day before meals    MEDICATIONS  (PRN):  acetaminophen     Tablet .. 650 milliGRAM(s) Oral every 6 hours PRN Temp greater or equal to 38C (100.4F), Mild Pain (1 - 3)  dextrose Oral Gel 15 Gram(s) Oral once PRN Blood Glucose LESS THAN 70 milliGRAM(s)/deciliter  melatonin 3 milliGRAM(s) Oral at bedtime PRN Insomnia      PAST MEDICAL & SURGICAL HISTORY:  DM (diabetes mellitus)      HTN (hypertension)      HLD (hyperlipidemia)      Iron deficiency anemia      History of hernia repair          FAMILY HISTORY:  FH: heart disease (Father)        SOCIAL HISTORY: No tobacco or drug use. he does drink 2 beers a day.    REVIEW OF SYSTEMS:  CONSTITUTIONAL: no fever  EYES/ENT: No visual changes; no throat pain  NECK: No pain or stiffness  RESPIRATORY: +ZAPATA. No cough  CARDIOVASCULAR: No chest pain or palpitations  GASTROINTESTINAL: No abdominal pain. No N/V/D/C  GENITOURINARY: No dysuria, change in frequency, or hematuria  NEUROLOGICAL: No numbness or focal weakness  SKIN: No itching, burning, rashes, or lesions  Psych: No depression  MSK: no joint pain  Allergy: no urticaria    Height (cm): 177.8 (11-29 @ 05:20)  Weight (kg): 84.1 (11-29 @ 05:20)  BMI (kg/m2): 26.6 (11-29 @ 05:20)  BSA (m2): 2.02 (11-29 @ 05:20)    T(F): 98.3 (11-29-22 @ 05:20), Max: 99 (11-28-22 @ 20:03)  HR: 88 (11-29-22 @ 05:20)  BP: 112/71 (11-29-22 @ 05:20)  RR: 18 (11-29-22 @ 05:20)  SpO2: 98% (11-29-22 @ 05:20)  Wt(kg): --    GENERAL: NAD  HEENT: EOMI, MMM, no oropharyngeal lesions or erythema appreciated  Pulm: no increased WOB, CTAB/L  CV: RRR, S1, S2, no m/g/r  ABDOMEN: soft, NT, ND, no masses felt, no HSM  MSK: nl ROM  EXTREMITIES: no appreciable edema in b/l LE  Neuro: A&Ox3, no focal deficits  SKIN: warm and dry, no visible rash                          8.7    6.51  )-----------( 62       ( 29 Nov 2022 10:47 )             26.3       11-29    138  |  100  |  17  ----------------------------<  151<H>  4.6   |  24  |  1.05    Ca    9.3      29 Nov 2022 10:47  Phos  3.5     11-29  Mg     1.6     11-29    TPro  7.7  /  Alb  4.2  /  TBili  1.3<H>  /  DBili  x   /  AST  20  /  ALT  27  /  AlkPhos  90  11-29      Magnesium, Serum: 1.6 mg/dL (11-29 @ 10:47)  Phosphorus Level, Serum: 3.5 mg/dL (11-29 @ 10:47)  Lactate Dehydrogenase, Serum: 177 U/L (11-28 @ 22:24)  Magnesium, Serum: 1.5 mg/dL (11-28 @ 15:43)

## 2022-11-29 NOTE — H&P ADULT - NSHPREVIEWOFSYSTEMS_GEN_ALL_CORE
REVIEW OF SYSTEMS:  CONSTITUTIONAL: (-) weakness, (-) fevers (-) chills  EYES/ENT: (- ) visual changes;  (-) vertigo (-) throat pain   NECK: (- )pain (-) stiffness  RESPIRATORY: (-)cough,  (-) wheezing, (-) hemoptysis; (-) shortness of breath  CARDIOVASCULAR: (-) chest pain (-) palpitations  GASTROINTESTINAL: (-) abdominal (-) epigastric pain. (-) nausea, vomiting, or hematemesis; (-) diarrhea or constipation.   GENITOURINARY: (-) dysuria, frequency or hematuria  NEUROLOGICAL: (-) numbness or weakness  SKIN: (-) itching, rashes  [x] neg except as above REVIEW OF SYSTEMS:  CONSTITUTIONAL: (+) fatigue (-) weakness, (-) fevers (-) chills  EYES: (-) visual changes;  (-) vertigo   ENMT: (-) throat pain (-) congestion    NECK: (- ) pain (-) stiffness  RESPIRATORY: (+) dyspnea (-)cough, (-) wheezing, (-) hemoptysis  CARDIOVASCULAR: (-) chest pain (-) palpitations  GASTROINTESTINAL: (-) abdominal (-) epigastric pain. (-) nausea, vomiting, or hematemesis; (-) diarrhea or constipation.   GENITOURINARY: (-) dysuria, frequency or hematuria  NEUROLOGICAL: (-) numbness or weakness  SKIN: (-) itching, rashes  PSYCH: (-) anxiety or depression   [x] neg except as above

## 2022-11-29 NOTE — PROGRESS NOTE ADULT - SUBJECTIVE AND OBJECTIVE BOX
DATE OF SERVICE: 11-29-22 @ 08:59    Patient is a 72y old  Male who presents with a chief complaint of fatigue and exertional dyspnea (29 Nov 2022 04:21)      SUBJECTIVE / OVERNIGHT EVENTS:  Transfused 1 unit pRBC overnight for Hb 6.7.  Afebrile, hemodynamically stable, sating well on RA. No tachycardia  ***    MEDICATIONS  (STANDING):  atorvastatin 40 milliGRAM(s) Oral at bedtime  dextrose 5%. 1000 milliLiter(s) (50 mL/Hr) IV Continuous <Continuous>  dextrose 50% Injectable 25 Gram(s) IV Push once  enalapril 20 milliGRAM(s) Oral daily  glucagon  Injectable 1 milliGRAM(s) IntraMuscular once  insulin lispro (ADMELOG) corrective regimen sliding scale   SubCutaneous three times a day before meals    MEDICATIONS  (PRN):  acetaminophen     Tablet .. 650 milliGRAM(s) Oral every 6 hours PRN Temp greater or equal to 38C (100.4F), Mild Pain (1 - 3)  dextrose Oral Gel 15 Gram(s) Oral once PRN Blood Glucose LESS THAN 70 milliGRAM(s)/deciliter  melatonin 3 milliGRAM(s) Oral at bedtime PRN Insomnia      Vital Signs Last 24 Hrs  T(C): 36.8 (29 Nov 2022 05:20), Max: 37.2 (28 Nov 2022 20:03)  T(F): 98.3 (29 Nov 2022 05:20), Max: 99 (28 Nov 2022 20:03)  HR: 88 (29 Nov 2022 05:20) (77 - 100)  BP: 112/71 (29 Nov 2022 05:20) (112/69 - 135/83)  BP(mean): 85 (28 Nov 2022 20:03) (85 - 85)  RR: 18 (29 Nov 2022 05:20) (17 - 20)  SpO2: 98% (29 Nov 2022 05:20) (98% - 100%)    Parameters below as of 29 Nov 2022 05:20  Patient On (Oxygen Delivery Method): room air      CAPILLARY BLOOD GLUCOSE      POCT Blood Glucose.: 163 mg/dL (29 Nov 2022 08:20)  POCT Blood Glucose.: 124 mg/dL (29 Nov 2022 00:33)    I&O's Summary    28 Nov 2022 07:01  -  29 Nov 2022 07:00  --------------------------------------------------------  IN: 350 mL / OUT: 0 mL / NET: 350 mL        PHYSICAL EXAM:  ***  GENERAL: NAD, well-developed  HEAD:  Atraumatic, Normocephalic  EYES: EOMI, PERRLA, conjunctiva and sclera clear  NECK: Supple, No JVD  CHEST/LUNG: Clear to auscultation bilaterally; No wheeze  HEART: Regular rate and rhythm; No murmurs, rubs, or gallops  ABDOMEN: Soft, Nontender, Nondistended; Bowel sounds present  EXTREMITIES:  2+ Peripheral Pulses, No clubbing, cyanosis, or edema  PSYCH: AAOx3  NEUROLOGY: non-focal  SKIN: No rashes or lesions    LABS:             ***          RADIOLOGY & ADDITIONAL TESTS:  Reviewed    < from: US Abdomen Complete (US Abdomen Complete .) (11.29.22 @ 02:38) >  FINDINGS:  Liver: Normal echotexture, and smooth hepatic capsule. Right hepatic lobe   measures 16.4 cm in long axis, within normal limits.  Bile ducts: Normal caliber. Common bile duct measures 2 mm.  Gallbladder: Within normal limits. Negative sonographic Portillo sign. No   wall thickening.  Pancreas: Visualized portions are sonographically unremarkable.  Spleen: 12.0 cm in length. Normal echotexture  Right kidney: 10.9 cm. No hydronephrosis. Normal echotexture.  Left kidney: 9.7 cm. No hydronephrosis. Normal echotexture  Ascites: None.  Aorta and IVC: Visualized portions are within normal limits.    IMPRESSION:  Normal abdominal ultrasound.    < end of copied text >      < from: Xray Chest 1 View- PORTABLE-Urgent (11.28.22 @ 15:25) >  FINDINGS:  The lungs are clear.  There is no pleural effusion or pneumothorax.  The heart is normal in size  The visualized osseous structures demonstrate no acute pathology.    IMPRESSION:  Clear lungs.    < end of copied text >   DATE OF SERVICE: 11-29-22 @ 08:59    Patient is a 72y old  Male who presents with a chief complaint of fatigue and exertional dyspnea (29 Nov 2022 04:21)      SUBJECTIVE / OVERNIGHT EVENTS:  Transfused 1 unit pRBC overnight for Hb 6.7.  Afebrile, hemodynamically stable, sating well on RA. No tachycardia  This am, denies chest pain, shortness of breath, dizziness, palpitations, LE swelling, joint pains, rash.    MEDICATIONS  (STANDING):  atorvastatin 40 milliGRAM(s) Oral at bedtime  dextrose 5%. 1000 milliLiter(s) (50 mL/Hr) IV Continuous <Continuous>  dextrose 50% Injectable 25 Gram(s) IV Push once  enalapril 20 milliGRAM(s) Oral daily  glucagon  Injectable 1 milliGRAM(s) IntraMuscular once  insulin lispro (ADMELOG) corrective regimen sliding scale   SubCutaneous three times a day before meals    MEDICATIONS  (PRN):  acetaminophen     Tablet .. 650 milliGRAM(s) Oral every 6 hours PRN Temp greater or equal to 38C (100.4F), Mild Pain (1 - 3)  dextrose Oral Gel 15 Gram(s) Oral once PRN Blood Glucose LESS THAN 70 milliGRAM(s)/deciliter  melatonin 3 milliGRAM(s) Oral at bedtime PRN Insomnia      Vital Signs Last 24 Hrs  T(C): 36.8 (29 Nov 2022 05:20), Max: 37.2 (28 Nov 2022 20:03)  T(F): 98.3 (29 Nov 2022 05:20), Max: 99 (28 Nov 2022 20:03)  HR: 88 (29 Nov 2022 05:20) (77 - 100)  BP: 112/71 (29 Nov 2022 05:20) (112/69 - 135/83)  BP(mean): 85 (28 Nov 2022 20:03) (85 - 85)  RR: 18 (29 Nov 2022 05:20) (17 - 20)  SpO2: 98% (29 Nov 2022 05:20) (98% - 100%)    Parameters below as of 29 Nov 2022 05:20  Patient On (Oxygen Delivery Method): room air      CAPILLARY BLOOD GLUCOSE      POCT Blood Glucose.: 163 mg/dL (29 Nov 2022 08:20)  POCT Blood Glucose.: 124 mg/dL (29 Nov 2022 00:33)    I&O's Summary    28 Nov 2022 07:01  -  29 Nov 2022 07:00  --------------------------------------------------------  IN: 350 mL / OUT: 0 mL / NET: 350 mL        PHYSICAL EXAM:  GENERAL: No apparent distress  HEAD:  Atraumatic, Normocephalic  EYES: EOMI, PERRLA, conjunctiva and sclera clear  NECK: Supple, No JVD  CHEST/LUNG: Clear to auscultation bilaterally; No wheeze  HEART: Regular rate and rhythm; No murmurs, rubs, or gallops  ABDOMEN: Soft, Nontender, Nondistended; Bowel sounds present  EXTREMITIES:  2+ Peripheral Pulses, No clubbing, cyanosis, or edema  PSYCH: AAOx3  NEUROLOGY: non-focal  SKIN: No rashes or lesions    LABS:                                   8.7    6.51  )-----------( 62       ( 29 Nov 2022 10:47 )             26.3     11-29    138  |  100  |  17  ----------------------------<  151<H>  4.6   |  24  |  1.05    Ca    9.3      29 Nov 2022 10:47  Phos  3.5     11-29  Mg     1.6     11-29    TPro  7.7  /  Alb  4.2  /  TBili  1.3<H>  /  DBili  x   /  AST  20  /  ALT  27  /  AlkPhos  90  11-29    PT/INR - ( 29 Nov 2022 10:48 )   PT: 14.6 sec;   INR: 1.26 ratio         PTT - ( 29 Nov 2022 10:48 )  PTT:29.2 sec          RADIOLOGY & ADDITIONAL TESTS:  Reviewed    < from: US Abdomen Complete (US Abdomen Complete .) (11.29.22 @ 02:38) >  FINDINGS:  Liver: Normal echotexture, and smooth hepatic capsule. Right hepatic lobe   measures 16.4 cm in long axis, within normal limits.  Bile ducts: Normal caliber. Common bile duct measures 2 mm.  Gallbladder: Within normal limits. Negative sonographic Portillo sign. No   wall thickening.  Pancreas: Visualized portions are sonographically unremarkable.  Spleen: 12.0 cm in length. Normal echotexture  Right kidney: 10.9 cm. No hydronephrosis. Normal echotexture.  Left kidney: 9.7 cm. No hydronephrosis. Normal echotexture  Ascites: None.  Aorta and IVC: Visualized portions are within normal limits.    IMPRESSION:  Normal abdominal ultrasound.    < end of copied text >      < from: Xray Chest 1 View- PORTABLE-Urgent (11.28.22 @ 15:25) >  FINDINGS:  The lungs are clear.  There is no pleural effusion or pneumothorax.  The heart is normal in size  The visualized osseous structures demonstrate no acute pathology.    IMPRESSION:  Clear lungs.    < end of copied text >

## 2022-11-29 NOTE — H&P ADULT - PROBLEM SELECTOR PLAN 2
Recent exertional dyspnea likely 2/2 anemia but will r/o other etiologies     -trops neg  -CXR appear clear  -f/u TTE   -EKG NSR, normal intervals, no ST or T wave abnormalities   -can consider ABG for A-a gradient Recent exertional dyspnea likely 2/2 anemia but will r/o other etiologies     -trops neg  -CXR appear clear  -f/u TTE   -EKG NSR, normal intervals, no ST or T wave abnormalities

## 2022-11-29 NOTE — CONSULT NOTE ADULT - ATTENDING COMMENTS
73 yo M with a PMH of DM2, HTN, and HLD who presents with 3 weeks of fatigue, poor PO intake and exertional dyspnea, found to have bi-cytopenia.    #Bi-Cytopenia  - Patient developing ZAPATA and new anemia/thrombocytopenia over the past couple months  - Hb 12.3 (08/2022) --> 10.7 (10/2022) --> 7.9 (admission)  - Plts 216 (08/2022) --> 193 (10/2022) --> 57 (admission)  - Iron studies show normal iron with TIBC (pre-transfusion) but ferritin markedly elevated at 1218  - B12 and folate normal, and labs not c/w hemolysis. Reticulocyte count inappropriately normal  - Peripheral smear personally reviewed and interpreted, showing slightly microcytic and hypochromic cells, occasional teardrop cells, rare platelet clumps, but no schistocytes  -- Will plan for bone marrow biopsy today to r/o intrinsic marrow process.

## 2022-11-30 NOTE — PHYSICAL THERAPY INITIAL EVALUATION ADULT - ADDITIONAL COMMENTS
Patient lives in pvt house with niece, 3 steps to enter. one flight inside.  Patient ambulated without AD independent. pt owns no DMEs.

## 2022-11-30 NOTE — PHYSICAL THERAPY INITIAL EVALUATION ADULT - PERTINENT HX OF CURRENT PROBLEM, REHAB EVAL
72M hx of iron def anemia, here for 3 weeks of ayon, fatigue, loss of appetite. Came back from [facility] 3 days ago (was there for 10 days). Denies infectious sxs. Denies abd pain, chest pain, shortness of breath. Not on antiplt or anticoags. Denies bright red or dark stools. Denies weight gain or leg swelling. trops neg, EKG NSR w/ no ST or T wave abnormalities. bi-cytopenia.. s/p bone marrow bx 11/29.

## 2022-11-30 NOTE — DISCHARGE NOTE NURSING/CASE MANAGEMENT/SOCIAL WORK - PATIENT PORTAL LINK FT
You can access the FollowMyHealth Patient Portal offered by [facility] by registering at the following website: [email or web address]. By joining [facility]’s FollowMyHealth portal, you will also be able to view your health information using other applications (apps) compatible with our system.

## 2022-11-30 NOTE — PROGRESS NOTE ADULT - ASSESSMENT
72M w/PMHx of T2DM, HTN, HLD, iron deficiency anemia  presenting with 3 weeks of fatigue, poor po intake and exertional dyspnea found to be pancytopenic
72M w/PMHx of T2DM, HTN, HLD, iron deficiency anemia  presenting with 3 weeks of fatigue, poor po intake and exertional dyspnea found to be pancytopenic

## 2022-11-30 NOTE — DISCHARGE NOTE PROVIDER - NSDCCPCAREPLAN_GEN_ALL_CORE_FT
PRINCIPAL DISCHARGE DIAGNOSIS  Diagnosis: Anemia  Assessment and Plan of Treatment: You were found to have a low hemoglobin, a marker of red blood cell count, on admission. You were transfused 1 unit of red blood cells, after which your hemoglobin remained stable. The cause of the anemia was unclear; initial workup was unrevealing, so you underwent a bone marrow biopsy performed by the hematology team (the blood specialists).  Please expect a phone call from hematology to schedule a follow-up appointment within the next few days. You will be able to review the results of your biopsy at that time. If you develop worsening shortness of breath or dizziness, or new symptoms such as chest pain or palpitations, please go to the emergency room.   In addition, you will be given a prescription for outpatient physical therapy because of your symptoms when walking.      SECONDARY DISCHARGE DIAGNOSES  Diagnosis: Thrombocytopenia  Assessment and Plan of Treatment: In addition to your anemia, you were found to have thrombocytopenia - a low platelet count. Platelets are important for clotting and preventing bleeding. It is possible that the anemia and thrombocytopenia are both caused by the same process in the bone marrow. Your platelet count was low but stable at time of discharge. If you develop significant bleeding or bruising, please go to the emergency room.    Diagnosis: Left ventricular diastolic dysfunction  Assessment and Plan of Treatment: You had an echocardiogram, or cardiac sonogram, to rule out other causes of shortness of breath. This showed normal systolic function and normal heart valves, however it did demonstrate abnormal diastolic function. Although this is an abnormal finding, it does not appear that your heart is the cause of your shortness of breath, as your symptoms improved following the blood transfusion and you did not appear to have fluid on your lungs. Please continue to take your blood pressure medication as prescribed and follow up with your PCP for further management.

## 2022-11-30 NOTE — PHYSICAL THERAPY INITIAL EVALUATION ADULT - PLANNED THERAPY INTERVENTIONS, PT EVAL
GOAL: Pt will negotiate 5 steps  up and down using HR support, independent  within 2-3 weeks./balance training/gait training/strengthening

## 2022-11-30 NOTE — DISCHARGE NOTE PROVIDER - NSDCCPTREATMENT_GEN_ALL_CORE_FT
PRINCIPAL PROCEDURE  Procedure: Bone marrow biopsy  Findings and Treatment: 11/29/22  · General Procedure Details  see below  · Tolerance  Patient tolerated procedure well.  · Complications  no complications  · Estimated Blood Loss  Minimal  · Post-Procedure Care Guidelines  Verbal/written post procedure instructions were given to patient/caregiver  · Additional Procedure Details  Sterile preparation of site with iodine, draped to expose aspirate/biopsy area, local anesthesia with 2% lidocaine (approximately 10ml), frequent pressure application on incision to maintain hemostasis.    Tissue obtained: bone marrow aspirate and biopsy were successfully obtained in sterile manner.      SECONDARY PROCEDURE  Procedure: Transthoracic echocardiography (TTE)  Findings and Treatment: 11/29/22  Dimensions:    Normal Values:  LA:     3.2    2.0 - 4.0 cm  Ao:     3.2    2.0 - 3.8 cm  SEPTUM: 0.7    0.6 - 1.2 cm  PWT:    0.7    0.6 - 1.1 cm  LVIDd:  4.2    3.0 - 5.6 cm  LVIDs:  2.6    1.8 - 4.0 cm  Derived variables:  LVMI: 38 g/m2  RWT: 0.33  Fractional short: 38 %  EF (Ely Rule): 53 %Doppler Peak Velocity (m/sec):  AoV=1.6  Conclusions:  1. Normal mitral valve. Minimal mitral regurgitation.  2. Normal left atrium.  LA volume index = 26 cc/m2.  3. Normal left ventricular internal dimensions and wall  thicknesses.  4. Normal left ventricular systolic function. No segmental  wall motion abnormalities. LVEF calculated using biplane  Ely's method is 53%.  5. Moderate diastolic dysfunction (Stage II).  6. Normal right atrium.  7. Normal right ventricular size and function.  8. Normal tricuspid valve. Mild tricuspid regurgitation.  9. Estimated pulmonary artery systolic pressure equals 28  mm Hg, assuming right atrial pressure equals 3  mm Hg,  consistent with normal pulmonary pressures.  10.No pericardial effusion seen.      Procedure: Complete abdominal ultrasound  Findings and Treatment: 11/29/22  FINDINGS:  Liver: Normal echotexture, and smooth hepatic capsule. Right hepatic lobe   measures 16.4 cm in long axis, within normal limits.  Bile ducts: Normal caliber. Common bile duct measures 2 mm.  Gallbladder: Within normal limits. Negative sonographic Portillo sign. No   wall thickening.  Pancreas: Visualized portions are sonographically unremarkable.  Spleen: 12.0 cm in length. Normal echotexture  Right kidney: 10.9 cm. No hydronephrosis. Normal echotexture.  Left kidney: 9.7 cm. No hydronephrosis. Normal echotexture  Ascites: None.  Aorta and IVC: Visualized portions are within normal limits.  IMPRESSION:  Normal abdominal ultrasound.      Procedure: X-ray, chest, 1 view  Findings and Treatment: 11/28/22  FINDINGS:  The lungs are clear.  There is no pleural effusion or pneumothorax.  The heart is normal in size  The visualized osseous structures demonstrate no acute pathology.  IMPRESSION:  Clear lungs.

## 2022-11-30 NOTE — PHYSICAL THERAPY INITIAL EVALUATION ADULT - NSPTDISCHREC_GEN_A_CORE
Outpatient PT per pt. doron available to assist as needed and transport him to and from Outpatient PT./Outpatient PT

## 2022-11-30 NOTE — PROGRESS NOTE ADULT - PROBLEM SELECTOR PLAN 6
Code status: full  DVT: scds given anemia, thrombocytopenia  Diet: carb consist, dash  Dispo: likely home

## 2022-11-30 NOTE — DISCHARGE NOTE PROVIDER - CARE PROVIDER_API CALL
Tim John)  Family Medicine  [address] [place] [address]  Phone: [phone]  Fax: [phone]  Established Patient  Follow Up Time: 1 week

## 2022-11-30 NOTE — DISCHARGE NOTE PROVIDER - NSDCMRMEDTOKEN_GEN_ALL_CORE_FT
enalapril 20 mg oral tablet: 1 tab(s) orally once a day  ferrous sulfate 324 mg (65 mg elemental iron) oral delayed release tablet: 1 tab(s) orally once a day  metFORMIN 500 mg oral tablet, extended release: 2 tab(s) orally 2 times a day  rosuvastatin 10 mg oral tablet: 1 tab(s) orally once a day

## 2022-11-30 NOTE — DISCHARGE NOTE PROVIDER - NSDCFUADDAPPT_GEN_ALL_CORE_FT
Please arrange a hospital follow-up appointment with your PCP, Dr. John, within 1-2 weeks of leaving the hospital.    In addition, please expect a phone call from the Hematology department within the next day or two to schedule a follow-up appointment. You will be able to review the results of your bone marrow biopsy at this follow-up appointment.

## 2022-11-30 NOTE — PHYSICAL THERAPY INITIAL EVALUATION ADULT - STAIR PATTERN, REHAB EVAL
step over step pt declined further stairs 2/2 feeling tired.This PT advised to take rest break in between during stair negotiation.Pt verbalized understanding./step over step

## 2022-11-30 NOTE — PROGRESS NOTE ADULT - PROBLEM SELECTOR PLAN 3
at home on enalapril 20, continue with home medications
at home on enalapril 20, continue with home medications

## 2022-11-30 NOTE — DISCHARGE NOTE PROVIDER - HOSPITAL COURSE
Discharge Summary     Admission diagnoses:   ***    Discharge diagnoses:   ***    Hospital Course:   For full details, please see H&P, progress notes, consult notes and ancillary notes. Briefly, Mr. Jose Calderón is a 73 yo Male with a history of T2DM, HTN, HLD, JACKELIN, admitted with 3 weeks of exertional dyspnea & fatigue, found to be pancytopenic. The patient's hospital course will be summarized in a problem based format.    #pancytopenia    # ***    On day of discharge, patient is clinically stable with no new exam findings or acute symptoms compared to prior. The patient was seen by the attending physician on the date of discharge and deemed stable and acceptable for discharge. The patient's chronic medical conditions were treated accordingly per the patient's home medication regimen. The patient's medication reconciliation (with changes made to chronic medications), follow up appointments, discharge orders, instructions, and significant lab and diagnostic studies are as noted.     Discharge follow up action items:     1. Follow up with PCP, hematology in 1-2 weeks.   2. Follow up labs, path, & imaging:  	- Bone marrow biopsy (f/u w/ hematology)  3. Medication changes:***    Patient's ordered code status: Full    Patient disposition: Home w/ outpatient PT     Discharge Summary     Admission & discharge diagnoses:   Bicytopenia (anemia, thrombocytopenia)    Hospital Course:   For full details, please see H&P, progress notes, consult notes and ancillary notes. Briefly, Mr. Jose Calderón is a 73 yo Male with a history of T2DM, HTN, HLD, JACKELIN, admitted with 3 weeks of exertional dyspnea & fatigue, found to have new anemia & thrombocytopenia. The patient's hospital course will be summarized in a problem based format.    #bicytopenia  On admission was found to have bicytopenia (Hb 7.9, Plt 61) which represented significant decline from previous evaluation in October.       On day of discharge, patient is clinically stable with no new exam findings or acute symptoms compared to prior. The patient was seen by the attending physician on the date of discharge and deemed stable and acceptable for discharge. The patient's chronic medical conditions were treated accordingly per the patient's home medication regimen. The patient's medication reconciliation (with changes made to chronic medications), follow up appointments, discharge orders, instructions, and significant lab and diagnostic studies are as noted.     Discharge follow up action items:     1. Follow up with PCP, hematology in 1-2 weeks.   2. Follow up labs, path, & imaging:  	- Bone marrow biopsy (f/u w/ hematology)  3. Medication changes:***    Patient's ordered code status: Full    Patient disposition: Home w/ outpatient PT     Discharge Summary     Admission & discharge diagnoses:   Bicytopenia (anemia, thrombocytopenia)    Hospital Course:   For full details, please see H&P, progress notes, consult notes and ancillary notes. Briefly, Mr. Jose Calderón is a 71 yo Male with a history of T2DM, HTN, HLD, JACKELIN, admitted with 3 weeks of exertional dyspnea & fatigue, found to have new anemia & thrombocytopenia. The patient's hospital course will be summarized in a problem based format.    #bicytopenia  On admission was found to have bicytopenia (Hb 7.9, Plt 61) which represented significant decline from previous evaluation in October. No hx of melena, BRBPR, and pt had nl colonoscopy 1 yr prior. Hemolysis labs were negative & reticulocyte count was inappropriately normal. B12, folate wnl, HIV negative. US abdomen was also wnl. Pt received 1u pRBC for Hb 6.7, with appropriate response. Hematology consulted, obtained bone marrow biopsy. H/H, platelet count stable at time of discharge. Pt will follow up with hematology outpt for further management.    #diastolic dysfunction  Pt had TTE which demonstrated normal LV systolic function (EF 53%), no valve dz, and grade II diastolic dysfunction. Clinically euvolemic w/ no signs of edema on CXR & pt's symptoms improved following transfusion. Pt was seen by PT due to ZAPATA and was recommended for outpatient PT. Pt will follow up with PCP for further management.    On day of discharge, patient is clinically stable with no new exam findings or acute symptoms compared to prior. The patient was seen by the attending physician on the date of discharge and deemed stable and acceptable for discharge. The patient's chronic medical conditions were treated accordingly per the patient's home medication regimen. The patient's medication reconciliation (with changes made to chronic medications), follow up appointments, discharge orders, instructions, and significant lab and diagnostic studies are as noted.     Discharge follow up action items:     1. Follow up with PCP, hematology in 1-2 weeks.   2. Follow up labs, path, & imaging:  	- Bone marrow biopsy (f/u w/ hematology)  3. Medication changes: None    Patient's ordered code status: Full    Patient disposition: Home w/ outpatient PT

## 2022-11-30 NOTE — PROGRESS NOTE ADULT - ATTENDING COMMENTS
Patient seen and examined   Labs and vitals are reviewed   Improved shortness of breath   no chest pain   no events   s/p bone marrow biopsy   repeat Hb 8.3  Platelets 54  71 Y/O/M presented  w/ dyspnea and fatigue, found to be pancytopenic which is new and , if no obvious cause identified may need to consider bone marrow biopsy if recommended by hematology, rest of plan as outlined above  Pancytopenia - s/p bone marrow biopsy   results pending but may take few days   HbA1c 7.4 will resume metformin upon DC follow up with PMD  will d/w haem and if okay can be discharged and follow up with haem as an outpatient to follow up with path and further management
Patient seen and examined   Labs and vitals are reviewed   s/p 1 unit of PRBC   repeat Hb 8.7   71 Y/O/M presented  w/ dyspnea and fatigue, found to be pancytopenic which is new and , if no obvious cause identified may need to consider bone marrow biopsy if recommended by hematology, rest of plan as outlined above  Pancytopenia - work up in progress  may need bone marrow exam   haem follow up

## 2022-11-30 NOTE — DISCHARGE NOTE NURSING/CASE MANAGEMENT/SOCIAL WORK - NSDCPEFALRISK_GEN_ALL_CORE
For information on Fall & Injury Prevention, visit: [email or web address] OR  [email or web address] OR  https://www.cdc.gov/steadi/patient.html

## 2022-11-30 NOTE — PROGRESS NOTE ADULT - SUBJECTIVE AND OBJECTIVE BOX
DATE OF SERVICE: 11-30-22 @ 07:27    Patient is a 72y old  Male who presents with a chief complaint of fatigue and exertional dyspnea (29 Nov 2022 15:18)      SUBJECTIVE / OVERNIGHT EVENTS:  No acute events overnight  Afebrile, hemodynamically stable  ***    MEDICATIONS  (STANDING):  atorvastatin 40 milliGRAM(s) Oral at bedtime  dextrose 5%. 1000 milliLiter(s) (50 mL/Hr) IV Continuous <Continuous>  dextrose 50% Injectable 25 Gram(s) IV Push once  enalapril 20 milliGRAM(s) Oral daily  glucagon  Injectable 1 milliGRAM(s) IntraMuscular once  insulin lispro (ADMELOG) corrective regimen sliding scale   SubCutaneous three times a day before meals    MEDICATIONS  (PRN):  acetaminophen     Tablet .. 650 milliGRAM(s) Oral every 6 hours PRN Temp greater or equal to 38C (100.4F), Mild Pain (1 - 3)  dextrose Oral Gel 15 Gram(s) Oral once PRN Blood Glucose LESS THAN 70 milliGRAM(s)/deciliter  melatonin 3 milliGRAM(s) Oral at bedtime PRN Insomnia      Vital Signs Last 24 Hrs  T(C): 36.9 (29 Nov 2022 20:53), Max: 36.9 (29 Nov 2022 17:33)  T(F): 98.5 (29 Nov 2022 20:53), Max: 98.5 (29 Nov 2022 17:33)  HR: 97 (29 Nov 2022 20:53) (87 - 97)  BP: 110/73 (29 Nov 2022 20:53) (110/73 - 128/75)  BP(mean): --  RR: 17 (29 Nov 2022 20:53) (17 - 17)  SpO2: 98% (29 Nov 2022 20:53) (98% - 100%)    Parameters below as of 29 Nov 2022 20:53  Patient On (Oxygen Delivery Method): room air      CAPILLARY BLOOD GLUCOSE      POCT Blood Glucose.: 133 mg/dL (29 Nov 2022 22:13)  POCT Blood Glucose.: 145 mg/dL (29 Nov 2022 17:14)  POCT Blood Glucose.: 144 mg/dL (29 Nov 2022 13:59)  POCT Blood Glucose.: 163 mg/dL (29 Nov 2022 08:20)    I&O's Summary      PHYSICAL EXAM:  GENERAL: No apparent distress  HEAD:  Atraumatic, Normocephalic  EYES: EOMI, PERRLA, conjunctiva and sclera clear  NECK: Supple, No JVD  CHEST/LUNG: Clear to auscultation bilaterally; No wheeze  HEART: Regular rate and rhythm; No murmurs, rubs, or gallops  ABDOMEN: Soft, Nontender, Nondistended; Bowel sounds present  EXTREMITIES:  2+ Peripheral Pulses, No clubbing, cyanosis, or edema  PSYCH: AAOx3  NEUROLOGY: non-focal  SKIN: No rashes or lesions      LABS:             ***      RADIOLOGY & ADDITIONAL TESTS:  Reviewed    < from: Transthoracic Echocardiogram (11.29.22 @ 11:49) >  Dimensions:    Normal Values:  LA:     3.2    2.0 - 4.0 cm  Ao:     3.2    2.0 - 3.8 cm  SEPTUM: 0.7    0.6 - 1.2 cm  PWT:    0.7    0.6 - 1.1 cm  LVIDd:  4.2    3.0 - 5.6 cm  LVIDs:  2.6    1.8 - 4.0 cm  Derived variables:  LVMI: 38 g/m2  RWT: 0.33  Fractional short: 38 %  EF (Ely Rule): 53 %Doppler Peak Velocity (m/sec):  AoV=1.6  ------------------------------------------------------------------------  Observations:  Mitral Valve: Normal mitral valve. Minimal mitral  regurgitation.  Aortic Valve/Aorta: Normal trileaflet aortic valve. Peak  transaortic valve gradient equals 10 mm Hg. No aortic valve  regurgitation seen. Peak left ventricular outflow tract  gradient equals 4 mm Hg, mean gradient is equal to 2 mm Hg,  LVOT velocity time integral equals 17 cm.  Aortic Root: 3.2 cm.  Ascending Aorta: 2.9 cm.  LVOT diameter: 2 cm.  Left Atrium: Normal left atrium.  LA volume index = 26  cc/m2.  Left Ventricle: Normal left ventricular systolic function.  No segmental wall motion abnormalities. LVEF calculated  using biplane Ely's method is 53%. Normal left  ventricular internal dimensions and wall thicknesses.  Moderate diastolic dysfunction (Stage II).  Right Heart: Normal right atrium. Normal right ventricular  size and function. Normal tricuspid valve. Mild tricuspid  regurgitation. Normal pulmonic valve. Minimal pulmonic  regurgitation.  Pericardium/Pleura: No pericardial effusion seen.  Hemodynamic: Estimated right ventricular systolic pressure  equals 28 mm Hg, assuming right atrial pressure equals 3 mm  Hg, consistent with normal pulmonary pressures.  ------------------------------------------------------------------------  Conclusions:  1. Normal mitral valve. Minimal mitral regurgitation.  2. Normal left atrium.  LA volume index = 26 cc/m2.  3. Normal left ventricular internal dimensions and wall  thicknesses.  4. Normal left ventricular systolic function. No segmental  wall motion abnormalities. LVEF calculated using biplane  Ely's method is 53%.  5. Moderate diastolic dysfunction (Stage II).  6. Normal right atrium.  7. Normal right ventricular size and function.  8. Normal tricuspid valve. Mild tricuspid regurgitation.  9. Estimated pulmonary artery systolic pressure equals 28  mm Hg, assuming right atrial pressure equals 3  mm Hg,  consistent with normal pulmonary pressures.  10.No pericardial effusion seen.  *** No previous Echo exam.    < end of copied text >   DATE OF SERVICE: 11-30-22 @ 07:27    Patient is a 72y old  Male who presents with a chief complaint of fatigue and exertional dyspnea (29 Nov 2022 15:18)      SUBJECTIVE / OVERNIGHT EVENTS:  No acute events overnight  Afebrile, hemodynamically stable  No new complaints today. States that he feels better and that he has been ambulating to the bathroom without SOB.     MEDICATIONS  (STANDING):  atorvastatin 40 milliGRAM(s) Oral at bedtime  dextrose 5%. 1000 milliLiter(s) (50 mL/Hr) IV Continuous <Continuous>  dextrose 50% Injectable 25 Gram(s) IV Push once  enalapril 20 milliGRAM(s) Oral daily  glucagon  Injectable 1 milliGRAM(s) IntraMuscular once  insulin lispro (ADMELOG) corrective regimen sliding scale   SubCutaneous three times a day before meals    MEDICATIONS  (PRN):  acetaminophen     Tablet .. 650 milliGRAM(s) Oral every 6 hours PRN Temp greater or equal to 38C (100.4F), Mild Pain (1 - 3)  dextrose Oral Gel 15 Gram(s) Oral once PRN Blood Glucose LESS THAN 70 milliGRAM(s)/deciliter  melatonin 3 milliGRAM(s) Oral at bedtime PRN Insomnia      Vital Signs Last 24 Hrs  T(C): 36.9 (29 Nov 2022 20:53), Max: 36.9 (29 Nov 2022 17:33)  T(F): 98.5 (29 Nov 2022 20:53), Max: 98.5 (29 Nov 2022 17:33)  HR: 97 (29 Nov 2022 20:53) (87 - 97)  BP: 110/73 (29 Nov 2022 20:53) (110/73 - 128/75)  BP(mean): --  RR: 17 (29 Nov 2022 20:53) (17 - 17)  SpO2: 98% (29 Nov 2022 20:53) (98% - 100%)    Parameters below as of 29 Nov 2022 20:53  Patient On (Oxygen Delivery Method): room air      CAPILLARY BLOOD GLUCOSE      POCT Blood Glucose.: 133 mg/dL (29 Nov 2022 22:13)  POCT Blood Glucose.: 145 mg/dL (29 Nov 2022 17:14)  POCT Blood Glucose.: 144 mg/dL (29 Nov 2022 13:59)  POCT Blood Glucose.: 163 mg/dL (29 Nov 2022 08:20)    I&O's Summary      PHYSICAL EXAM:  GENERAL: No apparent distress  HEAD:  Atraumatic, Normocephalic  EYES: EOMI, PERRLA, conjunctiva and sclera clear  NECK: Supple, No JVD  CHEST/LUNG: Clear to auscultation bilaterally; No wheeze  HEART: Regular rate and rhythm; No murmurs, rubs, or gallops  ABDOMEN: Soft, Nontender, Nondistended; Bowel sounds present  EXTREMITIES:  2+ Peripheral Pulses, No clubbing, cyanosis, or edema  PSYCH: AAOx3  NEUROLOGY: non-focal  SKIN: No rashes or lesions      LABS:                          8.3    3.10  )-----------( 54       ( 30 Nov 2022 07:12 )             24.7     11-30    140  |  101  |  14  ----------------------------<  104<H>  3.9   |  27  |  0.94    Ca    9.3      30 Nov 2022 07:12  Phos  3.8     11-30  Mg     1.6     11-30    TPro  7.2  /  Alb  3.9  /  TBili  1.1  /  DBili  x   /  AST  17  /  ALT  23  /  AlkPhos  75  11-30        RADIOLOGY & ADDITIONAL TESTS:  Reviewed    < from: Transthoracic Echocardiogram (11.29.22 @ 11:49) >  Dimensions:    Normal Values:  LA:     3.2    2.0 - 4.0 cm  Ao:     3.2    2.0 - 3.8 cm  SEPTUM: 0.7    0.6 - 1.2 cm  PWT:    0.7    0.6 - 1.1 cm  LVIDd:  4.2    3.0 - 5.6 cm  LVIDs:  2.6    1.8 - 4.0 cm  Derived variables:  LVMI: 38 g/m2  RWT: 0.33  Fractional short: 38 %  EF (Ely Rule): 53 %Doppler Peak Velocity (m/sec):  AoV=1.6  ------------------------------------------------------------------------  Observations:  Mitral Valve: Normal mitral valve. Minimal mitral  regurgitation.  Aortic Valve/Aorta: Normal trileaflet aortic valve. Peak  transaortic valve gradient equals 10 mm Hg. No aortic valve  regurgitation seen. Peak left ventricular outflow tract  gradient equals 4 mm Hg, mean gradient is equal to 2 mm Hg,  LVOT velocity time integral equals 17 cm.  Aortic Root: 3.2 cm.  Ascending Aorta: 2.9 cm.  LVOT diameter: 2 cm.  Left Atrium: Normal left atrium.  LA volume index = 26  cc/m2.  Left Ventricle: Normal left ventricular systolic function.  No segmental wall motion abnormalities. LVEF calculated  using biplane Ely's method is 53%. Normal left  ventricular internal dimensions and wall thicknesses.  Moderate diastolic dysfunction (Stage II).  Right Heart: Normal right atrium. Normal right ventricular  size and function. Normal tricuspid valve. Mild tricuspid  regurgitation. Normal pulmonic valve. Minimal pulmonic  regurgitation.  Pericardium/Pleura: No pericardial effusion seen.  Hemodynamic: Estimated right ventricular systolic pressure  equals 28 mm Hg, assuming right atrial pressure equals 3 mm  Hg, consistent with normal pulmonary pressures.  ------------------------------------------------------------------------  Conclusions:  1. Normal mitral valve. Minimal mitral regurgitation.  2. Normal left atrium.  LA volume index = 26 cc/m2.  3. Normal left ventricular internal dimensions and wall  thicknesses.  4. Normal left ventricular systolic function. No segmental  wall motion abnormalities. LVEF calculated using biplane  Ely's method is 53%.  5. Moderate diastolic dysfunction (Stage II).  6. Normal right atrium.  7. Normal right ventricular size and function.  8. Normal tricuspid valve. Mild tricuspid regurgitation.  9. Estimated pulmonary artery systolic pressure equals 28  mm Hg, assuming right atrial pressure equals 3  mm Hg,  consistent with normal pulmonary pressures.  10.No pericardial effusion seen.  *** No previous Echo exam.    < end of copied text >   Detail Level: Detailed Quality 111:Pneumonia Vaccination Status For Older Adults: Pneumococcal Vaccination not Administered or Previously Received, Reason not Otherwise Specified Quality 110: Preventive Care And Screening: Influenza Immunization: Influenza Immunization not Administered for Documented Reasons.

## 2022-11-30 NOTE — PROGRESS NOTE ADULT - PROBLEM SELECTOR PLAN 1
Presenting with fatigue, poor po intake and exertional dyspnea found to be grossly pancytopenic. Hemolysis labs are neg(retic wnl, LDH wnl, pending retic, but tbili wnl as well). Normal fibrinogen. Dax neg  - long standing etoh(2 beers per day use) with elevated coags and low platelets but normal albumin  - recent travel to [facility], no new meds, no recent infections  - US abd revealed no hepatic or splenic pathology  - transfuse hemoglobin<7 and platelets<10 (febrile<20, bleeding<50)  - s/p 1u pRBC on admission for Hb 6.7 -> 8.7  - folate, B12 nl. HIV negative. No hematuria on UA  - s/p bone marrow bx 11/29  - heme following

## 2022-11-30 NOTE — PROGRESS NOTE ADULT - PROBLEM SELECTOR PLAN 2
Recent exertional dyspnea likely 2/2 anemia but will r/o other etiologies   - trops neg, EKG NSR w/ no ST or T wave abnormalities  - CXR appear clear  - TTE grade II diastolic dysfunction, nl LV systolic function (EF 53%), no valve dz

## 2022-12-07 PROBLEM — E78.5 HYPERLIPIDEMIA, UNSPECIFIED: Chronic | Status: ACTIVE | Noted: 2022-01-01

## 2022-12-07 PROBLEM — Z00.00 ENCOUNTER FOR PREVENTIVE HEALTH EXAMINATION: Status: ACTIVE | Noted: 2022-01-01

## 2022-12-07 PROBLEM — E11.9 TYPE 2 DIABETES MELLITUS WITHOUT COMPLICATIONS: Chronic | Status: ACTIVE | Noted: 2022-01-01

## 2022-12-07 PROBLEM — I10 ESSENTIAL (PRIMARY) HYPERTENSION: Chronic | Status: ACTIVE | Noted: 2022-01-01

## 2022-12-07 PROBLEM — D50.9 IRON DEFICIENCY ANEMIA, UNSPECIFIED: Chronic | Status: ACTIVE | Noted: 2022-01-01

## 2022-12-12 PROBLEM — Z78.9 SOCIAL ALCOHOL USE: Status: ACTIVE | Noted: 2022-01-01

## 2022-12-12 PROBLEM — Z86.39 HISTORY OF HYPERLIPIDEMIA: Status: RESOLVED | Noted: 2022-01-01 | Resolved: 2022-01-01

## 2022-12-12 PROBLEM — Z86.39 HISTORY OF NON-INSULIN DEPENDENT TYPE 2 DIABETES MELLITUS: Status: RESOLVED | Noted: 2022-01-01 | Resolved: 2022-01-01

## 2022-12-12 PROBLEM — I10 HTN (HYPERTENSION), BENIGN: Status: RESOLVED | Noted: 2022-01-01 | Resolved: 2022-01-01

## 2022-12-12 NOTE — ASSESSMENT
[FreeTextEntry1] : 71 yo M with a PMH of DM2, HTN, and HLD who presents with 3 weeks of fatigue, poor PO intake and exertional dyspnea, found to have bi-cytopenia.\par 11/29/22- Bone marrow biopsy c/w Myelodysplastic syndrome with low blasts and increase p53 staining by immunohistochemistry. Cytogenetics, FISH pending. \par \par I had a detailed discussion today with the patient regarding the natural history, epidemiology, diagnosis, staging and treatment of myelodysplastic syndrome . I reviewed his laboratory  studies in detail today. I then discussed the results of the bone marrow biopsy c/w Myelodysplastic syndrome, cytogenetics and FISH pending. Patient will likely required Azacitidine and Venetoclax in the inpatient for 7 days.  I reviewed with patient and nice  the benefits versus risks of therapy. I answered all their questions to satisfaction.\par \par Hb is 6.8g/dl today, will schedule patient for 2 units of PRBC's.\par \par Greater than 50% of the encounter time was spent on counseling and coordination of care for  MDS    and I have spent 45   minutes of face to face time with the patient.\par \par \par RTC 4 weeks\par \par

## 2022-12-16 NOTE — H&P ADULT - NSHPLABSRESULTS_GEN_ALL_CORE
Final Diagnosis :   1, 2. Bone marrow biopsy and bone marrow aspirate   - Myelodysplastic syndrome with mutated p53 (complex cytogenetics and   somatic mutation of p53 with VAF of 37%)     FISH -  this specimen is considered positive for three and four   copies of CEP8 in 5.5% and 16% of cells, respectively.   This specimen is also considered positive for three copies of 20q in 21.5% of cells.

## 2022-12-16 NOTE — DISCHARGE NOTE PROVIDER - CARE PROVIDER_API CALL
Mago Damon; MSc)  Internal Medicine; Medical Oncology  [address] [place] [address], [place] [address]  Phone: [phone]  Fax: [phone]  Follow Up Time:

## 2022-12-16 NOTE — H&P ADULT - PROBLEM SELECTOR PLAN 1
Admit to [facility]  IVF's, Strict I/O's, daily weights  Check daily CBC, electrolytes, transfuse for Hgb<7.0, Plts <10k or 15k if febrile Admit to 7 [facility]  12/16  Started Vidaza 75mg/m2= 148mg subcutaneously qd x 7d, Venetoclax 100mg x1d, 200mg x1d, 400mg x1d. Consent obtained  IVF's, Strict I/O's, daily weights  Check daily CBC, electrolytes, TLS labs,  transfuse for Hgb<7.0, Plts <10k or 15k if febrile

## 2022-12-16 NOTE — PATIENT PROFILE ADULT - FALL HARM RISK - UNIVERSAL INTERVENTIONS
Bed in lowest position, wheels locked, appropriate side rails in place/Call bell, personal items and telephone in reach/Instruct patient to call for assistance before getting out of bed or chair/Non-slip footwear when patient is out of bed/[place] to call system/Physically safe environment - no spills, clutter or unnecessary equipment/Purposeful Proactive Rounding/Room/bathroom lighting operational, light cord in reach

## 2022-12-16 NOTE — DISCHARGE NOTE PROVIDER - NSDCFUADDINST_GEN_ALL_CORE_FT
Your doctor will inform you when to start taking the levaquin, posaconazole and valtrex. Please do not start this medication until you are directed to do so.    Appointment with Dr Winston at [facility] on 12/23/22 at 12:00 pm    Appointment with Dr Damon at [facility] on 1/6/23 at 3:00pm    To [facility] for possible Platelet transfusion on Saturday 12/24 at 10:00 am Your doctor will inform you when to start taking the levaquin, posaconazole and valtrex. Please do not start this medication until you are directed to do so.    Appointment with Dr Damon at [facility] on 1/6/23 at 3:00pm    To [facility] for possible Platelet transfusion on Saturday 12/24 at 10:00 am

## 2022-12-16 NOTE — H&P ADULT - ASSESSMENT
71 yo M with a PMH of DM2, HTN, and HLD, p/w with 3 weeks of fatigue, poor PO intake and exertional dyspnea, found to have bi-cytopenia. Bone Marrow bx revealed MDS ( 3% blasts) with TP53 mutation 71 y/o M with a PMH of DM2, HTN, and HLD, p/w with 3 weeks of fatigue, poor PO intake and exertional dyspnea, found to have bi-cytopenia. Bone Marrow bx revealed MDS ( 3% blasts) with TP53 mutation

## 2022-12-16 NOTE — DISCHARGE NOTE PROVIDER - NSDCMRMEDTOKEN_GEN_ALL_CORE_FT
enalapril 20 mg oral tablet: 1 tab(s) orally once a day  ferrous sulfate 324 mg (65 mg elemental iron) oral delayed release tablet: 1 tab(s) orally once a day  metFORMIN 500 mg oral tablet, extended release: 2 tab(s) orally 2 times a day  rosuvastatin 10 mg oral tablet: 1 tab(s) orally once a day   enalapril 20 mg oral tablet: 1 tab(s) orally once a day  ferrous sulfate 324 mg (65 mg elemental iron) oral delayed release tablet: 1 tab(s) orally once a day  metFORMIN 500 mg oral tablet, extended release: 2 tab(s) orally 2 times a day  posaconazole 100 mg oral delayed release tablet: 3 tab(s) orally once a day   rosuvastatin 10 mg oral tablet: 1 tab(s) orally once a day  venetoclax 100 mg oral tablet: 4 tab(s) orally    enalapril 20 mg oral tablet: 1 tab(s) orally once a day  ferrous sulfate 324 mg (65 mg elemental iron) oral delayed release tablet: 1 tab(s) orally once a day  levoFLOXacin 500 mg oral tablet: 1 tab(s) orally every 24 hours   Continue taking until directed to stop by your Hematologist   metFORMIN 500 mg oral tablet, extended release: 2 tab(s) orally 2 times a day  posaconazole 100 mg oral delayed release tablet: 3 tab(s) orally once a day   rosuvastatin 10 mg oral tablet: 1 tab(s) orally once a day  Valtrex 500 mg oral tablet: 1 tab(s) orally 2 times a day   venetoclax 100 mg oral tablet: 4 tab(s) orally    enalapril 20 mg oral tablet: 1 tab(s) orally once a day  ferrous sulfate 324 mg (65 mg elemental iron) oral delayed release tablet: 1 tab(s) orally once a day  levoFLOXacin 500 mg oral tablet: 1 tab(s) orally every 24 hours   PLEASE HOLD UNTIL YOU ARE INSTRUCTED TO START TO TAKE THIS DRUG  metFORMIN 500 mg oral tablet, extended release: 2 tab(s) orally 2 times a day  posaconazole 100 mg oral delayed release tablet: 3 tab(s) orally once a day   PLEASE HOLD UNTIL YOU ARE INSTRUCTED TO START TO TAKE THIS DRUG  posaconazole 100 mg oral delayed release tablet: 3 tab(s) orally once a day   rosuvastatin 10 mg oral tablet: 1 tab(s) orally once a day  Valtrex 500 mg oral tablet: 1 tab(s) orally 2 times a day   PLEASE HOLD UNTIL YOU ARE INSTRUCTED TO START TO TAKIE THIS DRUG  venetoclax 100 mg oral tablet: 4 tab(s) orally once a day

## 2022-12-16 NOTE — H&P ADULT - PROBLEM SELECTOR PLAN 2
Not neutropenic, afebrile  If spikes temp, pancx (blood cx, ucx), CXR,  consider abx  Follow CBC w diff daily, if  ANC <1000 will need ppx antimicrobials

## 2022-12-16 NOTE — DISCHARGE NOTE PROVIDER - NSDCFUADDAPPT_GEN_ALL_CORE_FT
Appointment with Dr Damon at [facility] on ____    Possible Platelet appointment on Saturday 12/24 at 10:30am Appointment with Dr Winston at [facility] on 12/23/22 at 12:00 pm    To [facility] for possible Platelet transfusion on Saturday 12/24 at 10:00 am    Appointment with Dr Damon at [facility] on 1/6/23 at 3:00 pm Appointment with Dr Winston at [facility] on 12/23/22 at 12:00 pm    To [facility] for possible Platelet transfusion on Saturday 12/24 at 10:00 am.

## 2022-12-16 NOTE — H&P ADULT - HISTORY OF PRESENT ILLNESS
71 yo M with a PMH of DM2, HTN, and HLD who recently presented to [facility]  with 3 weeks of fatigue, poor PO intake and exertional dyspnea, found to have bi-cytopenia. 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 Pt was recently seen by Hematologist Dr Damon and sent in for admssion to chemotherapy with Vidaz+ Venetoclax.    71 y/o M with a PMH of DM2, HTN, and HLD who recently presented to [facility]  with 3 weeks of fatigue, poor PO intake and exertional dyspnea, found to have bi-cytopenia. 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 Pt was recently seen by Hematologist Dr. Damon, required prbc transfusion for hgb 6.8. He is being admitted today  for elective admission 1st cycle chemotherapy with  Vidaza+ Venetoclax.

## 2022-12-16 NOTE — PATIENT PROFILE ADULT - FUNCTIONAL ASSESSMENT - BASIC MOBILITY 6.
4-calculated by average/Not able to assess (calculate score using [facility] averaging method)

## 2022-12-16 NOTE — DISCHARGE NOTE PROVIDER - HOSPITAL COURSE
73 y/o M with a PMH of DM2, HTN, and HLD, p/w with 3 weeks of fatigue, poor PO intake and exertional dyspnea, found to have bi-cytopenia. 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 IVF's were started, strict I/O's, daily weights were monitored. Daily CBC, CMP were checked and repleted as needed. 73 y/o M with a PMH of DM2, HTN, and HLD, p/w with 3 weeks of fatigue, poor PO intake and exertional dyspnea, found to have bi-cytopenia. 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 IVF's were started, strict I/O's, daily weights were monitored. Daily CBC, CMP were checked and repleted as needed. The patient was started on Cycle 1 of Vidaza/Venetoclax on 12/16. The patient tolerated this treatment well. Pancytopenia noted due to disease and treatment. 73 y/o M with a PMH of DM2, HTN, and HLD, p/w with 3 weeks of fatigue, poor PO intake and exertional dyspnea, found to have bi-cytopenia. 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 IVF's were started, strict I/O's, daily weights were monitored. Daily CBC, CMP were checked and repleted as needed. The patient was started on Cycle 1 of Vidaza/Venetoclax on 12/16. The patient tolerated this treatment well. He was supported as needed with transfusions and electrolyte supplementation. Pancytopenia noted due to disease and treatment. 71 y/o M with a PMH of DM2, HTN, and HLD, p/w with 3 weeks of fatigue, poor PO intake and exertional dyspnea, found to have bi-cytopenia. 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 IVF's were started, strict I/O's, daily weights were monitored. Daily CBC, CMP were checked and repleted as needed. The patient was started on Cycle 1 of Vidaza/Venetoclax on 12/16. The patient tolerated this treatment well. He was supported as needed with transfusions and electrolyte supplementation. Pancytopenia noted due to disease and chemotherapy. 73 y/o M with a PMH of DM2, HTN, and HLD, p/w with 3 weeks of fatigue, poor PO intake and exertional dyspnea, found to have bi-cytopenia. 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 IVF's were started, strict I/O's, daily weights were monitored. Daily CBC, CMP were checked and repleted as needed. The patient was started on Cycle 1 of Vidaza/Venetoclax on 12/16. The patient tolerated this treatment well. He was supported as needed with transfusions and electrolyte supplementation.  Patient received IVF and antiemetics, strict I/O  and monitoring of CBC and electrolytes. Tolerated chemotherapy without complications. Stable for discharge home and follow up as an outpatient.

## 2022-12-16 NOTE — DISCHARGE NOTE PROVIDER - NSDCCPCAREPLAN_GEN_ALL_CORE_FT
PRINCIPAL DISCHARGE DIAGNOSIS  Diagnosis: MDS (myelodysplastic syndrome)  Assessment and Plan of Treatment: Return to hospital emergency room if you have a temp >100.4 or intractable nausea/vomiting or abdominal pain. Continue taking  Venetoclax daily until instructed to hold by your Hematologist

## 2022-12-16 NOTE — H&P ADULT - CONSTITUTIONAL
67 y/o M, current smoker (1ppd x 50 years), current every day ETOH abuse (1 pint vodka and several beers daily x 50 years), with PMHx of HLD, HTN (not on any medications),  CABG (LIMA to LAD, SVG to PDA in 2017), AS (s/p AVR in 2017), who presented to [facility] ED on 2/24/22 with complaints of 9/10 chest pain with associated diaphoresis with exertion, admitted for NSTEMI, transferred to CTsx service for severe AS, requiring a TAVR procedure; upon workup for TAVR, patient incidentally noted to have a 2.4 cm hypervascular lesion for which GI was consulted, CT A/P concerning for HCC. Now Gi re-consulted for AMS.     #Hepatic Lesion  Patient presented to [facility] ED on 2/24/22 with complaints of 9/10 chest pain with associated diaphoresis with exertion, admitted for NSTEMI, transferred to CTsx service for severe AS, requiring a TAVR procedure. During workup for TAVR, CTA A/P (2/28) revealing a nodular liver contour, c/w cirrhosis, A 2.4 cm hypervascular liver mass suspicious for HCC, as well as splenomegaly. AFP 7.2 (WNL), While AFP noted to be normal, does not preclude risk of possibility of HCC, especially in the setting of what appears to be cirrhosis 2/2 underlying long-standing history of EtOH use, which places him at higher risk.   - CT A/P (3/6) revealing 2.5 cm left hepatic lesion consistent with LI-RADS v 2018 Category: LR-5 Definitely HCC. OPTN Category (if LR-5): 5B. No locally invasive or metastatic disease. Left hepatic artery variant. No additional suspicious hepatic lesions.  - In light of active EtOH use, would not qualify for liver transplant evaluation   - Not a surgical candidate as per surgical onc team  - Appreciate ongoing Heme/Onc recs  - MR Abdomen (3/9) confirming HCC. Per IR, will consider for TACE today 3/14/22    #Cirrhosis, likely compensated (-HE, -ascites, - no known EV bleeding)  CTA A/P with radiographic findings c/f cirrhosis, including nodular liver contour and splenomegaly, which is suggestive likely portal HTN. Likely in the setting of long-standing history of EtOH abuse.   MELD-Na: 11 (based on 3/11/22 BW)  HE: [id number] (less conversive on exam today), no asterixis   Ascites: None present on CT imaging  HCC: CTA A/P (2/28) with 2.4 cm hypervascular lesion. CT A/P (3/6), lesion c/w HCC  Varices: No prior EGD evaluation, would be warranted, can pursue as an outpatient  - Abdominal US with duplex (3/6) The portal veins, hepatic veins and hepatic arteries are patent with normal directionality of flow. Cirrhosis. Since 2/28/2022, again a 2.9 cm heterogeneous lesion is present within the left lobe of the liver (see CT findings as noted above, appears to be c/w HCC). MRI confirming HCC  - Daily CMPs & Coags to calculate MELD-Na  - Nutrition consult  - High protein diet  -  on alcohol cessation  - c/w Rifaximin 500 mg BID & lactulose, titrate to 2-3 BMs/day    #AMS  Unclear etiology for AMS this am with increased lethargy. Ddx includes HE, infection, librium, delirium. The elevated ammonia lever to 123 could be due to his nose bleed, which has been ongoing since 3/5. He does not have Asterixis on exam and is A&O with understanding of where he is and why he is hospitalized. If HE, unclear the inciting event to cause decompensation. Additionally, RUQ US today w/o ascites. Most likely this is build up of librium that is being poorly metabolized due to his liver function.  - Continue to hold librium  - c/w Rifaximin 500 mg BID & lactulose, titrate to 2-3 BMs/day  - CXR (3/6/22) negative for infiltrates  - UA with no pyuria however +bacteria, + nitrite, trace LE, abxs as per primary team. If with no improvement in MS, consider initiation of abxs  - CT Head (3/10) negative    #Anemia   With slow downtrend in H/H, Hgb 6.9, s/p 1 u pRBC however with extravasation. Downtrended again to 6.6 today (3/10). With dried blood on right nare, noted week prior to have blood from right nare along with leslie blood in oropharynx, currently with no blood in oropharynx on today's exam. With no overt bleeding including melena, hematochezia, hematemesis, coffee ground emesis.   - LDH/Haptoglobin c/w hemolysis, likely in the setting of severe AS  - Can obtain a peripheral smear  - Monitor H/H  - Transfusion goal >8  - In light of underlying cirrhosis, with evidence of thrombocytopenia and splenomegaly, suggestive of e/o portal HTN, would warrant endoscopic evaluation to further assess for EV/PHG however given recent NSTEMI, higher risk for endoscopic evaluation at this time. At this time, he has worsening ST depressions, which cardiology attributes to demand ischemia and recommending fluid bolus and blood to avoid hypotension.  - hold aspirin for now  - Agree with palliative care consult and GOC discussion    Kinza Ayoub MD  Gastroenterology Fellow, PGY -4   Pager [phone]  [phone] ms normal/well-groomed/no distress

## 2022-12-16 NOTE — DISCHARGE NOTE PROVIDER - NSDCFUSCHEDAPPT_GEN_ALL_CORE_FT
[facility]  [facility]  Scheduled Appointment: 12/19/2022    Mago Damon  [facility]  [facility]  Scheduled Appointment: 12/19/2022    [facility]  [facility]  Scheduled Appointment: 12/23/2022     [facility]  Scheduled Appointment: 12/23/2022    [facility]  Scheduled Appointment: 01/06/2023     [facility]  [facility]  Scheduled Appointment: 12/23/2022    [facility]  [facility]  Scheduled Appointment: 12/24/2022    [facility]  [facility]  Scheduled Appointment: 12/24/2022    Mago Damon  [facility]  [facility]  Scheduled Appointment: 01/06/2023

## 2022-12-17 NOTE — PROGRESS NOTE ADULT - ASSESSMENT
73 y/o M with a PMH of DM2, HTN, and HLD, p/w with 3 weeks of fatigue, poor PO intake and exertional dyspnea, found to have bi-cytopenia. Bone Marrow bx revealed MDS ( 3% blasts) with TP53 mutation 73 y/o M with a PMH of DM2, HTN, and HLD, p/w with 3 weeks of fatigue, poor PO intake and exertional dyspnea, found to have bi-cytopenia. 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 Now admitted for cycle 1 of Vidaza/Venetoclax. Patient pancytopenic due to disease and therapy.

## 2022-12-17 NOTE — PROGRESS NOTE ADULT - PROBLEM SELECTOR PLAN 2
Not neutropenic, afebrile  If spikes temp, pancx (blood cx, ucx), CXR,  consider abx  Follow CBC w diff daily, if  ANC <1000 will need ppx antimicrobials. Not neutropenic, afebrile  If spikes temp, pancx (blood cx, ucx), CXR, consider abx  Follow CBC w diff daily, if  ANC <1000 will need ppx antimicrobials.

## 2022-12-17 NOTE — PROGRESS NOTE ADULT - SUBJECTIVE AND OBJECTIVE BOX
Diagnosis    Protocol/Chemo Regimen:  Day:      Pt endorsed:    Review of Systems:      Pain scale:                                        Location:    Diet:     Allergies    No Known Allergies    Intolerances        ANTIMICROBIALS      HEME/ONC MEDICATIONS  azaCITIDine Injectable (eMAR) 49.33 milliGRAM(s) SubCutaneous every 24 hours  azaCITIDine Injectable (eMAR) 49.33 milliGRAM(s) SubCutaneous every 24 hours  azaCITIDine Injectable (eMAR) 49.33 milliGRAM(s) SubCutaneous every 24 hours      STANDING MEDICATIONS  allopurinol 300 milliGRAM(s) Oral daily  Biotene Dry Mouth Oral Rinse 15 milliLiter(s) Swish and Spit three times a day  dextrose 5%. 1000 milliLiter(s) IV Continuous <Continuous>  dextrose 5%. 1000 milliLiter(s) IV Continuous <Continuous>  dextrose 50% Injectable 25 Gram(s) IV Push once  dextrose 50% Injectable 12.5 Gram(s) IV Push once  dextrose 50% Injectable 25 Gram(s) IV Push once  enalapril 20 milliGRAM(s) Oral daily  FIRST- Mouthwash  BLM 30 milliLiter(s) Swish and Spit four times a day  glucagon  Injectable 1 milliGRAM(s) IntraMuscular once  insulin lispro (ADMELOG) corrective regimen sliding scale   SubCutaneous three times a day before meals  insulin lispro (ADMELOG) corrective regimen sliding scale   SubCutaneous at bedtime  magnesium sulfate  IVPB 2 Gram(s) IV Intermittent once  ondansetron Injectable 8 milliGRAM(s) IV Push every 24 hours  sodium chloride 0.9% lock flush 3 milliLiter(s) IV Push every 8 hours  sodium chloride 0.9%. 1000 milliLiter(s) IV Continuous <Continuous>      PRN MEDICATIONS  dextrose Oral Gel 15 Gram(s) Oral once PRN        Vital Signs Last 24 Hrs  T(C): 36.3 (17 Dec 2022 05:30), Max: 37.1 (16 Dec 2022 10:12)  T(F): 97.4 (17 Dec 2022 05:30), Max: 98.7 (16 Dec 2022 10:12)  HR: 88 (17 Dec 2022 05:30) (88 - 99)  BP: 112/73 (17 Dec 2022 05:30) (107/69 - 121/73)  BP(mean): --  RR: 18 (17 Dec 2022 05:30) (16 - 18)  SpO2: 97% (17 Dec 2022 05:30) (97% - 100%)    Parameters below as of 17 Dec 2022 05:30  Patient On (Oxygen Delivery Method): room air        PHYSICAL EXAM  General: adult in NAD  HEENT: clear oropharynx, anicteric sclera, pink conjunctiva  Neck: supple  CV: normal S1/S2 RRR  Lungs: positive air movement b/l ant lungs,clear to auscultation, no wheezes, no rales  Abdomen: soft non-tender non-distended, no hepatosplenomegaly  Ext: no clubbing cyanosis or edema  Skin: no rashes and no petechiae  Neuro: alert and oriented X 3, no focal deficits  Central Line: normal    LABS:    Blood Cultures:                           6.8    4.07  )-----------( 59       ( 17 Dec 2022 07:05 )             21.1         Mean Cell Volume : 89.4 fl  Mean Cell Hemoglobin : 28.8 pg  Mean Cell Hemoglobin Concentration : 32.2 gm/dL  Auto Neutrophil # : x  Auto Lymphocyte # : x  Auto Monocyte # : x  Auto Eosinophil # : x  Auto Basophil # : x  Auto Neutrophil % : x  Auto Lymphocyte % : x  Auto Monocyte % : x  Auto Eosinophil % : x  Auto Basophil % : x      12-17    141  |  104  |  16  ----------------------------<  119<H>  4.3   |  27  |  1.09    Ca    9.2      17 Dec 2022 06:51  Phos  4.1     12-17  Mg     1.4     12-17    TPro  7.0  /  Alb  3.9  /  TBili  0.9  /  DBili  x   /  AST  27  /  ALT  36  /  AlkPhos  76  12-17      Mg 1.4  Phos 4.1              RADIOLOGY & ADDITIONAL STUDIES:         Diagnosis: MDS    Protocol/Chemo Regimen: Vidaza/Venetoclax (Vidaza 75mg/m2= 148mg subcutaneously qd x 7d, Venetoclax 100mg x1d, 200mg x1d, 400mg therafter)  Day: 2     Pt endorsed: Mild dyspnea on exertion.     Review of Systems: Denies headache, chest pain, abdominal pain, diarrhea, constipation, nausea, vomiting       Pain scale:  0                         Diet: Diet, Regular (12-16-22 @ 08:06) [Active]    Allergies    No Known Allergies    Intolerances      ANTIMICROBIALS      HEME/ONC MEDICATIONS  azaCITIDine Injectable (eMAR) 49.33 milliGRAM(s) SubCutaneous every 24 hours  azaCITIDine Injectable (eMAR) 49.33 milliGRAM(s) SubCutaneous every 24 hours  azaCITIDine Injectable (eMAR) 49.33 milliGRAM(s) SubCutaneous every 24 hours      MEDICATIONS  (STANDING):  allopurinol 300 milliGRAM(s) Oral daily  azaCITIDine Injectable (eMAR) 49.33 milliGRAM(s) SubCutaneous every 24 hours  azaCITIDine Injectable (eMAR) 49.33 milliGRAM(s) SubCutaneous every 24 hours  azaCITIDine Injectable (eMAR) 49.33 milliGRAM(s) SubCutaneous every 24 hours  Biotene Dry Mouth Oral Rinse 15 milliLiter(s) Swish and Spit three times a day  dextrose 5%. 1000 milliLiter(s) (50 mL/Hr) IV Continuous <Continuous>  dextrose 5%. 1000 milliLiter(s) (100 mL/Hr) IV Continuous <Continuous>  dextrose 50% Injectable 25 Gram(s) IV Push once  dextrose 50% Injectable 12.5 Gram(s) IV Push once  dextrose 50% Injectable 25 Gram(s) IV Push once  enalapril 20 milliGRAM(s) Oral daily  FIRST- Mouthwash  BLM 30 milliLiter(s) Swish and Spit four times a day  glucagon  Injectable 1 milliGRAM(s) IntraMuscular once  insulin lispro (ADMELOG) corrective regimen sliding scale   SubCutaneous three times a day before meals  insulin lispro (ADMELOG) corrective regimen sliding scale   SubCutaneous at bedtime  ondansetron Injectable 8 milliGRAM(s) IV Push every 24 hours  sodium chloride 0.9% lock flush 3 milliLiter(s) IV Push every 8 hours  sodium chloride 0.9%. 1000 milliLiter(s) (75 mL/Hr) IV Continuous <Continuous>  venetoclax 200 milliGRAM(s) Oral once    MEDICATIONS  (PRN):  dextrose Oral Gel 15 Gram(s) Oral once PRN Blood Glucose LESS THAN 70 milliGRAM(s)/deciliter      Vital Signs Last 24 Hrs  T(C): 36.9 (17 Dec 2022 13:15), Max: 37.2 (17 Dec 2022 09:00)  T(F): 98.4 (17 Dec 2022 13:15), Max: 98.9 (17 Dec 2022 09:00)  HR: 86 (17 Dec 2022 13:15) (86 - 102)  BP: 100/67 (17 Dec 2022 13:15) (100/67 - 120/73)  BP(mean): --  RR: 17 (17 Dec 2022 13:15) (16 - 18)  SpO2: 98% (17 Dec 2022 13:15) (97% - 99%)    Parameters below as of 17 Dec 2022 13:15  Patient On (Oxygen Delivery Method): room air          PHYSICAL EXAM  General: adult in NAD  HEENT: clear oropharynx, anicteric sclera, pink conjunctiva  CV: normal S1/S2 RRR  Lungs: clear to auscultation bilaterally, no wheezes, no rales  Abdomen: soft non-tender non-distended, no hepatosplenomegaly  Ext: no edema  Skin: no rashes  Neuro: alert and oriented X 3  Central Line: N/A      LABS:                          6.8    4.07  )-----------( 59       ( 17 Dec 2022 07:05 )             21.1     CBC Full  -  ( 17 Dec 2022 07:05 )  WBC Count : 4.07 K/uL  RBC Count : 2.36 M/uL  Hemoglobin : 6.8 g/dL  Hematocrit : 21.1 %  Platelet Count - Automated : 59 K/uL  Mean Cell Volume : 89.4 fl  Mean Cell Hemoglobin : 28.8 pg  Mean Cell Hemoglobin Concentration : 32.2 gm/dL  Auto Neutrophil # : 2.87 K/uL  Auto Lymphocyte # : 0.71 K/uL  Auto Monocyte # : 0.19 K/uL  Auto Eosinophil # : 0.00 K/uL  Auto Basophil # : 0.00 K/uL  Auto Neutrophil % : 68.8 %  Auto Lymphocyte % : 17.4 %  Auto Monocyte % : 4.6 %  Auto Eosinophil % : 0.0 %  Auto Basophil % : 0.0 %      12-17    141  |  104  |  16  ----------------------------<  119<H>  4.3   |  27  |  1.09    Ca    9.2      17 Dec 2022 06:51  Phos  4.1     12-17  Mg     1.4     12-17    TPro  7.0  /  Alb  3.9  /  TBili  0.9  /  DBili  x   /  AST  27  /  ALT  36  /  AlkPhos  76  12-17        CULTURES:    None      RADIOLOGY & ADDITIONAL STUDIES:    None     Diagnosis: MDS    Protocol/Chemo Regimen: Vidaza/Venetoclax (Vidaza 75mg/m2= 148mg subcutaneously qd x 7d, Venetoclax 100mg x1d, 200mg x1d, 400mg therafter)  Day: 2     Pt endorsed: Mild dyspnea on exertion.     Review of Systems: Denies headache, chest pain, abdominal pain, diarrhea, constipation, nausea, vomiting       Pain scale:  0                         Diet: Diet, Regular (12-16-22 @ 08:06) [Active]    Allergies    No Known Allergies    Intolerances      ANTIMICROBIALS      HEME/ONC MEDICATIONS  azaCITIDine Injectable (eMAR) 49.33 milliGRAM(s) SubCutaneous every 24 hours  azaCITIDine Injectable (eMAR) 49.33 milliGRAM(s) SubCutaneous every 24 hours  azaCITIDine Injectable (eMAR) 49.33 milliGRAM(s) SubCutaneous every 24 hours      MEDICATIONS  (STANDING):  allopurinol 300 milliGRAM(s) Oral daily  azaCITIDine Injectable (eMAR) 49.33 milliGRAM(s) SubCutaneous every 24 hours  azaCITIDine Injectable (eMAR) 49.33 milliGRAM(s) SubCutaneous every 24 hours  azaCITIDine Injectable (eMAR) 49.33 milliGRAM(s) SubCutaneous every 24 hours  Biotene Dry Mouth Oral Rinse 15 milliLiter(s) Swish and Spit three times a day  dextrose 5%. 1000 milliLiter(s) (50 mL/Hr) IV Continuous <Continuous>  dextrose 5%. 1000 milliLiter(s) (100 mL/Hr) IV Continuous <Continuous>  dextrose 50% Injectable 25 Gram(s) IV Push once  dextrose 50% Injectable 12.5 Gram(s) IV Push once  dextrose 50% Injectable 25 Gram(s) IV Push once  enalapril 20 milliGRAM(s) Oral daily  FIRST- Mouthwash  BLM 30 milliLiter(s) Swish and Spit four times a day  glucagon  Injectable 1 milliGRAM(s) IntraMuscular once  insulin lispro (ADMELOG) corrective regimen sliding scale   SubCutaneous three times a day before meals  insulin lispro (ADMELOG) corrective regimen sliding scale   SubCutaneous at bedtime  ondansetron Injectable 8 milliGRAM(s) IV Push every 24 hours  sodium chloride 0.9% lock flush 3 milliLiter(s) IV Push every 8 hours  sodium chloride 0.9%. 1000 milliLiter(s) (75 mL/Hr) IV Continuous <Continuous>  venetoclax 200 milliGRAM(s) Oral once    MEDICATIONS  (PRN):  dextrose Oral Gel 15 Gram(s) Oral once PRN Blood Glucose LESS THAN 70 milliGRAM(s)/deciliter      Vital Signs Last 24 Hrs  T(C): 36.9 (17 Dec 2022 13:15), Max: 37.2 (17 Dec 2022 09:00)  T(F): 98.4 (17 Dec 2022 13:15), Max: 98.9 (17 Dec 2022 09:00)  HR: 86 (17 Dec 2022 13:15) (86 - 102)  BP: 100/67 (17 Dec 2022 13:15) (100/67 - 120/73)  BP(mean): --  RR: 17 (17 Dec 2022 13:15) (16 - 18)  SpO2: 98% (17 Dec 2022 13:15) (97% - 99%)    Parameters below as of 17 Dec 2022 13:15  Patient On (Oxygen Delivery Method): room air          PHYSICAL EXAM  General: adult in NAD  HEENT: clear oropharynx, anicteric sclera, pink conjunctiva  CV: normal S1/S2 RRR  Lungs: clear to auscultation bilaterally, no wheezes, no rales  Abdomen: soft non-tender non-distended, no hepatosplenomegaly  Ext: no edema  Skin: no rashes  Neuro: alert and oriented X 3  Central Line: N/A      LABS:                          6.8    4.07  )-----------( 59       ( 17 Dec 2022 07:05 )             21.1     CBC Full  -  ( 17 Dec 2022 07:05 )  WBC Count : 4.07 K/uL  RBC Count : 2.36 M/uL  Hemoglobin : 6.8 g/dL  Hematocrit : 21.1 %  Platelet Count - Automated : 59 K/uL  Mean Cell Volume : 89.4 fl  Mean Cell Hemoglobin : 28.8 pg  Mean Cell Hemoglobin Concentration : 32.2 gm/dL  Auto Neutrophil # : 2.87 K/uL  Auto Lymphocyte # : 0.71 K/uL  Auto Monocyte # : 0.19 K/uL  Auto Eosinophil # : 0.00 K/uL  Auto Basophil # : 0.00 K/uL  Auto Neutrophil % : 68.8 %  Auto Lymphocyte % : 17.4 %  Auto Monocyte % : 4.6 %  Auto Eosinophil % : 0.0 %  Auto Basophil % : 0.0 %      12-17    141  |  104  |  16  ----------------------------<  119<H>  4.3   |  27  |  1.09    Ca    9.2      17 Dec 2022 06:51  Phos  4.1     12-17  Mg     1.4     12-17    TPro  7.0  /  Alb  3.9  /  TBili  0.9  /  DBili  x   /  AST  27  /  ALT  36  /  AlkPhos  76  12-17    Direct Dax Poly: Negative (12.17.22 @ 15:03)       CULTURES:    None      RADIOLOGY & ADDITIONAL STUDIES:    None

## 2022-12-17 NOTE — PROGRESS NOTE ADULT - NS ATTEND AMEND GEN_ALL_CORE FT
72M with PMH of DM, HTN, HLD, who presented with newly diagnosed MDS (3% blasts) with TP53 mutation. Now admitted for inpatient therapy with azacitidine and venetoclax.     # MDS: Admitted for inpatient treatment with HMA and venetoclax. Today is Day 2.  - Continue azacitidine 75 mg/m2 x 7 days   - Continue venetoclax 200 mg. Uptitrate to 400 mg daily tomorrow  - Trend CBC with differential daily. Supportive transfusions to maintain Hgb > 7 and plt > 10.   - Not neutropenic, so will monitor off antibacterial prophylaxis    The information documented within the attending attestation was documented solely by Ayana Lassiter.

## 2022-12-17 NOTE — PROGRESS NOTE ADULT - PROBLEM SELECTOR PLAN 4
Behavioral Health Consultation  Mirtha Elliott Psy.D. Psychologist  7/11/2019  11:32 AM      Time spent with Patient: 30 minutes  This is patient's eighth [facility] appointment     Reason for Consult:  depression, anxiety and stress  Referring Provider: Heena Orr MD  [address] [place] [id number], [address] [place] [address]    Pt's mother [facility] was present during the first half of this visit, with pt's consent. S:  Pt reported that she's been up and down, feeling more depressed again. She's been tired all the time, not doing anything. Overwhelmed. Angry, yelling at her family a lot. Worried, overwhelmed about finances and care needs for her disabled daughter. Pt has still been off from work, does not plan to return to her job. Looking for a new job. Pt was sick this past week. Doesn't feel anxious all the time like she was. Per pt's mother, pt is very angry, especially at her ex-. Daughter Juana Wen is pregnant and has moved back home. No money coming into the household because no one is working. Pt takes her irritability out on her family. Mother is helping as much as she can but cannot be there every day.       O:  MSE:  Appearance:n good hygiene and appropriate attire  Attitude: cooperative, tearful and moderate distress  Consciousness: alert  Orientation: oriented to person, place, time, general circumstance  Memory: recent and remote memory intact  Attention/Concentration: intact during session  Psychomotor Activity: normal  Eye Contact: normal  Speech: normal rate and volume, well-articulated  Mood: dyshporic and anxious  Affect: congruent with thought content and moodPerception: within normal limits  Thought Content: within normal limits   Thought Process: logical, goal-directed, coherent  Insight: improving  Judgment: intact  Morbid ideation: no  Suicide Assessment: no suicidal ideation, plan or intent since her hospitalization      History:    Medications:   Current Outpatient Medications   Medication Sig Dispense Refill    JARDIANCE 25 MG tablet TAKE 1 TABLET BY MOUTH DAILY 90 tablet 0    Dulaglutide (TRULICITY) 5.75 AP/3.5WB SOPN INJECT THE CONTENTS OF ONE SYRINGE UNDER THE SKIN ONCE WEEKLY 12 pen 0    ARIPiprazole (ABILIFY) 15 MG tablet Take 1 tablet by mouth daily 30 tablet 1    busPIRone (BUSPAR) 15 MG tablet Take 15 mg by mouth 3 times daily 90 tablet 1    escitalopram (LEXAPRO) 20 MG tablet Take 1 tablet by mouth nightly 30 tablet 1    gabapentin (NEURONTIN) 100 MG capsule Take 2 capsules by mouth 3 times daily for 30 days. 180 capsule 1    mirtazapine (REMERON) 30 MG tablet Take 0.5 tablets by mouth nightly 15 tablet 1    dicyclomine (BENTYL) 10 MG capsule Take 1 capsule by mouth 4 times daily (before meals and nightly) for 10 days 40 capsule 0    AGAMATRIX ULTRA-THIN LANCETS MISC 1 each by Other route 2 times daily 100 each 3    blood glucose test strips (AGAMATRIX PRESTO TEST) strip 1 each by Other route 2 times daily As needed. 100 each 3    vitamin D (ERGOCALCIFEROL) 50061 units CAPS capsule Take 1 capsule by mouth once a week (Patient taking differently: Take 50,000 Units by mouth once a week Takes at hs on Fri) 13 capsule 3    lisinopril (PRINIVIL;ZESTRIL) 10 MG tablet Take 1 tablet by mouth daily FOLLOW UP APPOINTMENT AND LABS ARE NEEDED. (Patient taking differently: Take 10 mg by mouth nightly FOLLOW UP APPOINTMENT AND LABS ARE NEEDED.) 90 tablet 0    Lancets MISC Check Blood sugars bid. 300 each 1    glucose blood VI test strips (FREESTYLE LITE) strip FOLLOW PACKAGE DIRECTIONS . TEST TWICE DAILY 300 strip 1    progesterone (PROMETRIUM) 100 MG capsule Take 100 mg by mouth daily. Indications: only takes 10 days per month       No current facility-administered medications for this visit.         Social History:   Social History     Socioeconomic History    Marital status:      Spouse name: Not on file    Number of children: 3    Years of education: 14+    Highest education level: Not on file   Occupational History    Occupation: respiratory therapist   Social Needs    Financial resource strain: Not on file    Food insecurity:     Worry: Not on file     Inability: Not on file    Transportation needs:     Medical: Not on file     Non-medical: Not on file   Tobacco Use    Smoking status: Never Smoker    Smokeless tobacco: Never Used   Substance and Sexual Activity    Alcohol use: Yes     Alcohol/week: 0.6 oz     Types: 1 Shots of liquor per week     Comment: occasional-less than monthly    Drug use: No    Sexual activity: Yes     Partners: Male   Lifestyle    Physical activity:     Days per week: Not on file     Minutes per session: Not on file    Stress: Not on file   Relationships    Social connections:     Talks on phone: Not on file     Gets together: Not on file     Attends Jew service: Not on file     Active member of club or organization: Not on file     Attends meetings of clubs or organizations: Not on file     Relationship status: Not on file    Intimate partner violence:     Fear of current or ex partner: Not on file     Emotionally abused: Not on file     Physically abused: Not on file     Forced sexual activity: Not on file   Other Topics Concern    Not on file   Social History Narrative    Not on file       TOBACCO:   reports that she has never smoked. She has never used smokeless tobacco.  ETOH:   reports that she drinks about 0.6 oz of alcohol per week.     Family History:   Family History   Problem Relation Age of Onset    High Blood Pressure Mother    [facility] Migraines Mother     Mental Illness Mother         depression    Breast Cancer Mother     Depression Mother     High Blood Pressure Father     Diabetes Father     Cancer Father         skin    Depression Brother     High Cholesterol Brother     Mental Illness Brother         depression, OCD    Cancer Maternal Grandfather         lung    Asthma Other     Mental Illness Other         BAD, Continue ACE-I with holding parameters. Continue enalapril with holding parameters.

## 2022-12-18 NOTE — PROGRESS NOTE ADULT - PROBLEM SELECTOR PLAN 2
Not neutropenic, afebrile  If spikes temp, pancx (blood cx, ucx), CXR, consider abx  Follow CBC w diff daily, if  ANC <1000 will need ppx antimicrobials.

## 2022-12-18 NOTE — PROGRESS NOTE ADULT - SUBJECTIVE AND OBJECTIVE BOX
Diagnosis: MDS    Protocol/Chemo Regimen: Vidaza/Venetoclax (Vidaza 75mg/m2= 148mg subcutaneously qd x 7d, Venetoclax 100mg x1d, 200mg x1d, 400mg therafter)  Day: 3     Pt endorsed: Mild dyspnea on exertion.     Review of Systems: Denies headache, chest pain, abdominal pain, diarrhea, constipation, nausea, vomiting       Pain scale:  0                         Diet: Diet, Regular (12-16-22 @ 08:06) [Active]    Allergies    No Known Allergies    Intolerances      ANTIMICROBIALS      HEME/ONC MEDICATIONS  azaCITIDine Injectable (eMAR) 49.33 milliGRAM(s) SubCutaneous every 24 hours  azaCITIDine Injectable (eMAR) 49.33 milliGRAM(s) SubCutaneous every 24 hours  azaCITIDine Injectable (eMAR) 49.33 milliGRAM(s) SubCutaneous every 24 hours      MEDICATIONS  (STANDING):  allopurinol 300 milliGRAM(s) Oral daily  azaCITIDine Injectable (eMAR) 49.33 milliGRAM(s) SubCutaneous every 24 hours  azaCITIDine Injectable (eMAR) 49.33 milliGRAM(s) SubCutaneous every 24 hours  azaCITIDine Injectable (eMAR) 49.33 milliGRAM(s) SubCutaneous every 24 hours  Biotene Dry Mouth Oral Rinse 15 milliLiter(s) Swish and Spit three times a day  dextrose 5%. 1000 milliLiter(s) (50 mL/Hr) IV Continuous <Continuous>  dextrose 5%. 1000 milliLiter(s) (100 mL/Hr) IV Continuous <Continuous>  dextrose 50% Injectable 25 Gram(s) IV Push once  dextrose 50% Injectable 12.5 Gram(s) IV Push once  dextrose 50% Injectable 25 Gram(s) IV Push once  enalapril 20 milliGRAM(s) Oral daily  FIRST- Mouthwash  BLM 30 milliLiter(s) Swish and Spit four times a day  glucagon  Injectable 1 milliGRAM(s) IntraMuscular once  insulin lispro (ADMELOG) corrective regimen sliding scale   SubCutaneous three times a day before meals  insulin lispro (ADMELOG) corrective regimen sliding scale   SubCutaneous at bedtime  ondansetron Injectable 8 milliGRAM(s) IV Push every 24 hours  sodium chloride 0.9% lock flush 3 milliLiter(s) IV Push every 8 hours  sodium chloride 0.9%. 1000 milliLiter(s) (75 mL/Hr) IV Continuous <Continuous>  venetoclax 200 milliGRAM(s) Oral once    MEDICATIONS  (PRN):  dextrose Oral Gel 15 Gram(s) Oral once PRN Blood Glucose LESS THAN 70 milliGRAM(s)/deciliter      Vital Signs Last 24 Hrs  T(C): 36.9 (17 Dec 2022 13:15), Max: 37.2 (17 Dec 2022 09:00)  T(F): 98.4 (17 Dec 2022 13:15), Max: 98.9 (17 Dec 2022 09:00)  HR: 86 (17 Dec 2022 13:15) (86 - 102)  BP: 100/67 (17 Dec 2022 13:15) (100/67 - 120/73)  BP(mean): --  RR: 17 (17 Dec 2022 13:15) (16 - 18)  SpO2: 98% (17 Dec 2022 13:15) (97% - 99%)    Parameters below as of 17 Dec 2022 13:15  Patient On (Oxygen Delivery Method): room air          PHYSICAL EXAM  General: adult in NAD  HEENT: clear oropharynx, anicteric sclera, pink conjunctiva  CV: normal S1/S2 RRR  Lungs: clear to auscultation bilaterally, no wheezes, no rales  Abdomen: soft non-tender non-distended, no hepatosplenomegaly  Ext: no edema  Skin: no rashes  Neuro: alert and oriented X 3  Central Line: N/A      LABS:                          6.8    4.07  )-----------( 59       ( 17 Dec 2022 07:05 )             21.1     CBC Full  -  ( 17 Dec 2022 07:05 )  WBC Count : 4.07 K/uL  RBC Count : 2.36 M/uL  Hemoglobin : 6.8 g/dL  Hematocrit : 21.1 %  Platelet Count - Automated : 59 K/uL  Mean Cell Volume : 89.4 fl  Mean Cell Hemoglobin : 28.8 pg  Mean Cell Hemoglobin Concentration : 32.2 gm/dL  Auto Neutrophil # : 2.87 K/uL  Auto Lymphocyte # : 0.71 K/uL  Auto Monocyte # : 0.19 K/uL  Auto Eosinophil # : 0.00 K/uL  Auto Basophil # : 0.00 K/uL  Auto Neutrophil % : 68.8 %  Auto Lymphocyte % : 17.4 %  Auto Monocyte % : 4.6 %  Auto Eosinophil % : 0.0 %  Auto Basophil % : 0.0 %      12-17    141  |  104  |  16  ----------------------------<  119<H>  4.3   |  27  |  1.09    Ca    9.2      17 Dec 2022 06:51  Phos  4.1     12-17  Mg     1.4     12-17    TPro  7.0  /  Alb  3.9  /  TBili  0.9  /  DBili  x   /  AST  27  /  ALT  36  /  AlkPhos  76  12-17    Direct Dax Poly: Negative (12.17.22 @ 15:03)       CULTURES:    None      RADIOLOGY & ADDITIONAL STUDIES:    None     Diagnosis: MDS    Protocol/Chemo Regimen: Vidaza/Venetoclax (Vidaza 75mg/m2= 148mg subcutaneously qd x 7d, Venetoclax 100mg x1d, 200mg x1d, 400mg therafter)  Day: 3     Pt endorsed: Right sided back pain that extends to his hip and upper thigh, which he experiences often at home.    Review of Systems: Denies headache, chest pain, abdominal pain, diarrhea, constipation, nausea, vomiting       Pain scale:  0                         Diet: Diet, Regular (12-16-22 @ 08:06) [Active]    Allergies    No Known Allergies    Intolerances      HEME/ONC MEDS:  azaCITIDine Injectable (eMAR) 49.33 milliGRAM(s) SubCutaneous every 24 hours  azaCITIDine Injectable (eMAR) 49.33 milliGRAM(s) SubCutaneous every 24 hours  azaCITIDine Injectable (eMAR) 49.33 milliGRAM(s) SubCutaneous every 24 hours      MEDICATIONS  (STANDING):  allopurinol 300 milliGRAM(s) Oral daily  azaCITIDine Injectable (eMAR) 49.33 milliGRAM(s) SubCutaneous every 24 hours  azaCITIDine Injectable (eMAR) 49.33 milliGRAM(s) SubCutaneous every 24 hours  azaCITIDine Injectable (eMAR) 49.33 milliGRAM(s) SubCutaneous every 24 hours  Biotene Dry Mouth Oral Rinse 15 milliLiter(s) Swish and Spit three times a day  dextrose 5%. 1000 milliLiter(s) (50 mL/Hr) IV Continuous <Continuous>  dextrose 5%. 1000 milliLiter(s) (100 mL/Hr) IV Continuous <Continuous>  dextrose 50% Injectable 25 Gram(s) IV Push once  dextrose 50% Injectable 12.5 Gram(s) IV Push once  dextrose 50% Injectable 25 Gram(s) IV Push once  enalapril 20 milliGRAM(s) Oral daily  FIRST- Mouthwash  BLM 30 milliLiter(s) Swish and Spit four times a day  glucagon  Injectable 1 milliGRAM(s) IntraMuscular once  insulin lispro (ADMELOG) corrective regimen sliding scale   SubCutaneous three times a day before meals  insulin lispro (ADMELOG) corrective regimen sliding scale   SubCutaneous at bedtime  ondansetron Injectable 8 milliGRAM(s) IV Push every 24 hours  sodium chloride 0.9% lock flush 3 milliLiter(s) IV Push every 8 hours  sodium chloride 0.9%. 1000 milliLiter(s) (75 mL/Hr) IV Continuous <Continuous>  venetoclax 400 milliGRAM(s) Oral <User Schedule>    MEDICATIONS  (PRN):  acetaminophen     Tablet .. 650 milliGRAM(s) Oral every 6 hours PRN Temp greater or equal to 38C (100.4F), Mild Pain (1 - 3)  dextrose Oral Gel 15 Gram(s) Oral once PRN Blood Glucose LESS THAN 70 milliGRAM(s)/deciliter      Vital Signs Last 24 Hrs  T(C): 37.6 (18 Dec 2022 08:15), Max: 37.6 (18 Dec 2022 08:15)  T(F): 99.6 (18 Dec 2022 08:15), Max: 99.6 (18 Dec 2022 08:15)  HR: 88 (18 Dec 2022 08:15) (84 - 93)  BP: 112/69 (18 Dec 2022 08:15) (110/70 - 112/69)  RR: 18 (18 Dec 2022 08:15) (18 - 18)  SpO2: 99% (18 Dec 2022 08:15) (98% - 100%)    Parameters below as of 18 Dec 2022 08:15  Patient On (Oxygen Delivery Method): room air      PHYSICAL EXAM  General: adult in NAD  HEENT: clear oropharynx, anicteric sclera, pink conjunctiva  CV: normal S1/S2 RRR  Lungs: clear to auscultation bilaterally, no wheezes, no rales  Abdomen: soft non-tender non-distended  Ext: no edema  Skin: no rashes  Neuro: alert and oriented X 3  Central Line: N/A      LABS:                7.6    4.96  )-----------( 53       ( 18 Dec 2022 10:45 )             23.1     CBC Full  -  ( 18 Dec 2022 10:45 )  WBC Count : 4.96 K/uL  RBC Count : 2.60 M/uL  Hemoglobin : 7.6 g/dL  Hematocrit : 23.1 %  Platelet Count - Automated : 53 K/uL  Mean Cell Volume : 88.8 fl  Mean Cell Hemoglobin : 29.2 pg  Mean Cell Hemoglobin Concentration : 32.9 gm/dL  Auto Neutrophil # : 3.76 K/uL  Auto Lymphocyte # : 0.66 K/uL  Auto Monocyte # : 0.13 K/uL  Auto Eosinophil # : 0.04 K/uL  Auto Basophil # : 0.00 K/uL  Auto Neutrophil % : 70.5 %  Auto Lymphocyte % : 13.4 %  Auto Monocyte % : 2.7 %  Auto Eosinophil % : 0.9 %  Auto Basophil % : 0.0 %    12-18    138  |  105  |  14  ----------------------------<  168<H>  4.3   |  26  |  1.13    Ca    9.0      18 Dec 2022 10:46  Phos  3.2     12-18  Mg     1.7     12-18    TPro  6.8  /  Alb  3.9  /  TBili  0.9  /  DBili  x   /  AST  25  /  ALT  38  /  AlkPhos  74  12-18    Lactate Dehydrogenase, Serum: 327 U/L    Direct Dax Poly: Negative (12.17.22 @ 15:03)       CULTURES:    None      RADIOLOGY & ADDITIONAL STUDIES:    None     Diagnosis: MDS    Protocol/Chemo Regimen: Vidaza/Venetoclax (Vidaza 75mg/m2= 148mg subcutaneously qd x 7d, Venetoclax 100mg x1d, 200mg x1d, 400mg therafter)  Day: 3     Pt endorsed: Right sided back pain that extends to his hip and upper thigh, which he experiences often at home.    Review of Systems: Denies headache, chest pain, abdominal pain, diarrhea, constipation, nausea, vomiting     Pain scale:  0                         Diet: Diet, Regular (12-16-22 @ 08:06) [Active]    Allergies: No Known Allergies      HEME/ONC MEDS:  azaCITIDine Injectable (eMAR) 49.33 milliGRAM(s) SubCutaneous every 24 hours  azaCITIDine Injectable (eMAR) 49.33 milliGRAM(s) SubCutaneous every 24 hours  azaCITIDine Injectable (eMAR) 49.33 milliGRAM(s) SubCutaneous every 24 hours    MEDICATIONS  (STANDING):  allopurinol 300 milliGRAM(s) Oral daily  azaCITIDine Injectable (eMAR) 49.33 milliGRAM(s) SubCutaneous every 24 hours  azaCITIDine Injectable (eMAR) 49.33 milliGRAM(s) SubCutaneous every 24 hours  azaCITIDine Injectable (eMAR) 49.33 milliGRAM(s) SubCutaneous every 24 hours  Biotene Dry Mouth Oral Rinse 15 milliLiter(s) Swish and Spit three times a day  dextrose 5%. 1000 milliLiter(s) (50 mL/Hr) IV Continuous <Continuous>  dextrose 5%. 1000 milliLiter(s) (100 mL/Hr) IV Continuous <Continuous>  dextrose 50% Injectable 25 Gram(s) IV Push once  dextrose 50% Injectable 12.5 Gram(s) IV Push once  dextrose 50% Injectable 25 Gram(s) IV Push once  enalapril 20 milliGRAM(s) Oral daily  FIRST- Mouthwash  BLM 30 milliLiter(s) Swish and Spit four times a day  glucagon  Injectable 1 milliGRAM(s) IntraMuscular once  insulin lispro (ADMELOG) corrective regimen sliding scale   SubCutaneous three times a day before meals  insulin lispro (ADMELOG) corrective regimen sliding scale   SubCutaneous at bedtime  ondansetron Injectable 8 milliGRAM(s) IV Push every 24 hours  sodium chloride 0.9% lock flush 3 milliLiter(s) IV Push every 8 hours  sodium chloride 0.9%. 1000 milliLiter(s) (75 mL/Hr) IV Continuous <Continuous>  venetoclax 400 milliGRAM(s) Oral <User Schedule>    MEDICATIONS  (PRN):  acetaminophen     Tablet .. 650 milliGRAM(s) Oral every 6 hours PRN Temp greater or equal to 38C (100.4F), Mild Pain (1 - 3)  dextrose Oral Gel 15 Gram(s) Oral once PRN Blood Glucose LESS THAN 70 milliGRAM(s)/deciliter    Vital Signs Last 24 Hrs  T(C): 37.6 (18 Dec 2022 08:15), Max: 37.6 (18 Dec 2022 08:15)  T(F): 99.6 (18 Dec 2022 08:15), Max: 99.6 (18 Dec 2022 08:15)  HR: 88 (18 Dec 2022 08:15) (84 - 93)  BP: 112/69 (18 Dec 2022 08:15) (110/70 - 112/69)  RR: 18 (18 Dec 2022 08:15) (18 - 18)  SpO2: 99% (18 Dec 2022 08:15) (98% - 100%)    Parameters below as of 18 Dec 2022 08:15  Patient On (Oxygen Delivery Method): room air    PHYSICAL EXAM  General: adult in NAD  HEENT: clear oropharynx, anicteric sclera, pink conjunctiva  CV: normal S1/S2 RRR  Lungs: clear to auscultation bilaterally, no wheezes, no rales  Abdomen: soft non-tender non-distended  Ext: no edema  Skin: no rashes  Neuro: alert and oriented X 3  Central Line: N/A    LABS:                7.6    4.96  )-----------( 53       ( 18 Dec 2022 10:45 )             23.1     CBC Full  -  ( 18 Dec 2022 10:45 )  WBC Count : 4.96 K/uL  RBC Count : 2.60 M/uL  Hemoglobin : 7.6 g/dL  Hematocrit : 23.1 %  Platelet Count - Automated : 53 K/uL  Mean Cell Volume : 88.8 fl  Mean Cell Hemoglobin : 29.2 pg  Mean Cell Hemoglobin Concentration : 32.9 gm/dL  Auto Neutrophil # : 3.76 K/uL  Auto Lymphocyte # : 0.66 K/uL  Auto Monocyte # : 0.13 K/uL  Auto Eosinophil # : 0.04 K/uL  Auto Basophil # : 0.00 K/uL  Auto Neutrophil % : 70.5 %  Auto Lymphocyte % : 13.4 %  Auto Monocyte % : 2.7 %  Auto Eosinophil % : 0.9 %  Auto Basophil % : 0.0 %    12-18    138  |  105  |  14  ----------------------------<  168<H>  4.3   |  26  |  1.13    Ca    9.0      18 Dec 2022 10:46  Phos  3.2     12-18  Mg     1.7     12-18    TPro  6.8  /  Alb  3.9  /  TBili  0.9  /  DBili  x   /  AST  25  /  ALT  38  /  AlkPhos  74  12-18    Lactate Dehydrogenase, Serum: 327 U/L  Direct Dax Poly: Negative (12.17.22 @ 15:03)     CULTURES:    None      RADIOLOGY & ADDITIONAL STUDIES:    None

## 2022-12-18 NOTE — PROGRESS NOTE ADULT - PROBLEM SELECTOR PLAN 1
12/16 - Started Vidaza 75mg/m2= 148mg subcutaneously qd x 7d, Venetoclax 100mg x1d, 200mg x1d, 400mg thereafter. Consent obtained  IVF's, Strict I/O's, daily weights  Check daily CBC, electrolytes, TLS labs,  transfuse for Hgb<7.0, Plts <10k or 15k if febrile. Blood consent in chart.  12/17 - 1 unit PRBCs for Hgb < 7.0. Monitor for hemolysis. Magnesium supplemented. 12/16 - Started Vidaza 75mg/m2= 148mg subcutaneously qd x 7d, Venetoclax 100mg x1d, 200mg x1d, 400mg thereafter. Consent obtained.  IVF's, Strict I/O's, daily weights  Check daily CBC, electrolytes, TLS labs,  transfuse for Hgb<7.0, Plts <10k or 15k if febrile. Blood consent in chart.  Monitor for hemolysis with PRBC transfusions per blood bank.

## 2022-12-18 NOTE — PROGRESS NOTE ADULT - NS ATTEND AMEND GEN_ALL_CORE FT
72M with PMH of DM, HTN, HLD, who presented with newly diagnosed MDS (3% blasts) with TP53 mutation. Now admitted for inpatient therapy with azacitidine and venetoclax.     # MDS: Admitted for inpatient treatment with HMA and venetoclax. Today is Day 2.  - Continue azacitidine 75 mg/m2 x 7 days   - Continue venetoclax 200 mg. Uptitrate to 400 mg daily tomorrow  - Trend CBC with differential daily. Supportive transfusions to maintain Hgb > 7 and plt > 10.   - Not neutropenic, so will monitor off antibacterial prophylaxis    The information documented within the attending attestation was documented solely by Ayana Lassiter. 72M with PMH of DM, HTN, HLD, who presented with newly diagnosed MDS (3% blasts) with TP53 mutation. Now admitted for inpatient therapy with azacitidine and venetoclax.     # MDS: Admitted for inpatient treatment with HMA and venetoclax.   - Continue azacitidine 75 mg/m2 x 7 days. Today is Day 3.  - Continue venetoclax 400 mg daily   - Trend CBC with differential daily. Supportive transfusions to maintain Hgb > 7 and plt > 10.   - Not neutropenic, so will monitor off antibacterial prophylaxis    The information documented within the attending attestation was documented solely by Ayana Lassiter.

## 2022-12-18 NOTE — PROGRESS NOTE ADULT - ASSESSMENT
73 y/o M with a PMH of DM2, HTN, and HLD, p/w with 3 weeks of fatigue, poor PO intake and exertional dyspnea, found to have bi-cytopenia. 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 Now admitted for cycle 1 of Vidaza/Venetoclax. Patient pancytopenic due to disease and therapy.

## 2022-12-19 NOTE — PROGRESS NOTE ADULT - NS ATTEND AMEND GEN_ALL_CORE FT
Primary:     Vital Signs Last 24 Hrs  T(C): 36.9 (19 Dec 2022 05:50), Max: 37.6 (18 Dec 2022 08:15)  T(F): 98.4 (19 Dec 2022 05:50), Max: 99.6 (18 Dec 2022 08:15)  HR: 90 (19 Dec 2022 05:50) (75 - 90)  BP: 110/65 (19 Dec 2022 05:50) (110/65 - 122/71)  BP(mean): --  RR: 18 (19 Dec 2022 05:50) (18 - 18)  SpO2: 99% (19 Dec 2022 05:50) (99% - 100%)    Parameters below as of 19 Dec 2022 05:50  Patient On (Oxygen Delivery Method): room air    MEDICATIONS  (STANDING):  allopurinol 300 milliGRAM(s) Oral daily  azaCITIDine Injectable (eMAR) 49.33 milliGRAM(s) SubCutaneous every 24 hours  azaCITIDine Injectable (eMAR) 49.33 milliGRAM(s) SubCutaneous every 24 hours  azaCITIDine Injectable (eMAR) 49.33 milliGRAM(s) SubCutaneous every 24 hours  Biotene Dry Mouth Oral Rinse 15 milliLiter(s) Swish and Spit three times a day  chlorhexidine 2% Cloths 1 Application(s) Topical daily  dextrose 5%. 1000 milliLiter(s) (50 mL/Hr) IV Continuous <Continuous>  dextrose 5%. 1000 milliLiter(s) (100 mL/Hr) IV Continuous <Continuous>  dextrose 50% Injectable 25 Gram(s) IV Push once  dextrose 50% Injectable 12.5 Gram(s) IV Push once  dextrose 50% Injectable 25 Gram(s) IV Push once  enalapril 20 milliGRAM(s) Oral daily  FIRST- Mouthwash  BLM 30 milliLiter(s) Swish and Spit four times a day  glucagon  Injectable 1 milliGRAM(s) IntraMuscular once  insulin lispro (ADMELOG) corrective regimen sliding scale   SubCutaneous three times a day before meals  insulin lispro (ADMELOG) corrective regimen sliding scale   SubCutaneous at bedtime  ondansetron Injectable 8 milliGRAM(s) IV Push every 24 hours  sodium chloride 0.9% lock flush 3 milliLiter(s) IV Push every 8 hours  sodium chloride 0.9%. 1000 milliLiter(s) (75 mL/Hr) IV Continuous <Continuous>  venetoclax 400 milliGRAM(s) Oral <User Schedule>        Assessment: 72 year old day 4 Aza/ricarda for TP53 MDS.    PMH: DM, HTN, HLD    Plan:  Heme: PLT goal > 10,000; Hgb goal > 7.0g/dL   azacitidine 75 mg/m2 x 7 days.   venetoclax 400 mg daily     ID:     Nutrition: tolerating PO    DVT prophylaxis:    Over 35 minutes were spent in direct patient care and care coordination. Primary: Goldberg    Vital Signs Last 24 Hrs  T(C): 36.9 (19 Dec 2022 05:50), Max: 37.6 (18 Dec 2022 08:15)  T(F): 98.4 (19 Dec 2022 05:50), Max: 99.6 (18 Dec 2022 08:15)  HR: 90 (19 Dec 2022 05:50) (75 - 90)  BP: 110/65 (19 Dec 2022 05:50) (110/65 - 122/71)  BP(mean): --  RR: 18 (19 Dec 2022 05:50) (18 - 18)  SpO2: 99% (19 Dec 2022 05:50) (99% - 100%)    Parameters below as of 19 Dec 2022 05:50  Patient On (Oxygen Delivery Method): room air    MEDICATIONS  (STANDING):  allopurinol 300 milliGRAM(s) Oral daily  azaCITIDine Injectable (eMAR) 49.33 milliGRAM(s) SubCutaneous every 24 hours  azaCITIDine Injectable (eMAR) 49.33 milliGRAM(s) SubCutaneous every 24 hours  azaCITIDine Injectable (eMAR) 49.33 milliGRAM(s) SubCutaneous every 24 hours  Biotene Dry Mouth Oral Rinse 15 milliLiter(s) Swish and Spit three times a day  chlorhexidine 2% Cloths 1 Application(s) Topical daily  dextrose 5%. 1000 milliLiter(s) (50 mL/Hr) IV Continuous <Continuous>  dextrose 5%. 1000 milliLiter(s) (100 mL/Hr) IV Continuous <Continuous>  dextrose 50% Injectable 25 Gram(s) IV Push once  dextrose 50% Injectable 12.5 Gram(s) IV Push once  dextrose 50% Injectable 25 Gram(s) IV Push once  enalapril 20 milliGRAM(s) Oral daily  FIRST- Mouthwash  BLM 30 milliLiter(s) Swish and Spit four times a day  glucagon  Injectable 1 milliGRAM(s) IntraMuscular once  insulin lispro (ADMELOG) corrective regimen sliding scale   SubCutaneous three times a day before meals  insulin lispro (ADMELOG) corrective regimen sliding scale   SubCutaneous at bedtime  ondansetron Injectable 8 milliGRAM(s) IV Push every 24 hours  sodium chloride 0.9% lock flush 3 milliLiter(s) IV Push every 8 hours  sodium chloride 0.9%. 1000 milliLiter(s) (75 mL/Hr) IV Continuous <Continuous>  venetoclax 400 milliGRAM(s) Oral <User Schedule>        Assessment: 72 year old day 4 Aza/ricarda for TP53 MDS.    PMH: DM, HTN, HLD    Plan:  Heme: PLT goal > 10,000; Hgb goal > 7.0g/dL   azacitidine 75 mg/m2 x 7 days.   venetoclax 400 mg daily   alllopurinol     ID: does not require primary prophylaxis at this time.     Nutrition: tolerating PO    DVT prophylaxis: hold LWHx secondary low PLT count.     Over 35 minutes were spent in direct patient care and care coordination.

## 2022-12-19 NOTE — PROGRESS NOTE ADULT - PROBLEM SELECTOR PLAN 1
12/16 - Started Vidaza 75mg/m2= 148mg subcutaneously qd x 7d, Venetoclax 100mg x1d, 200mg x1d, 400mg thereafter. Consent obtained.  IVF's, Strict I/O's, daily weights  Check daily CBC, electrolytes, TLS labs,  transfuse for Hgb<7.0, Plts <10k or 15k if febrile. Blood consent in chart.  Monitor for hemolysis with PRBC transfusions per blood bank.

## 2022-12-20 NOTE — PROGRESS NOTE ADULT - NS ATTEND AMEND GEN_ALL_CORE FT
Primary: Damon     Vital Signs Last 24 Hrs  T(C): 37.2 (20 Dec 2022 05:31), Max: 37.3 (19 Dec 2022 17:29)  T(F): 98.9 (20 Dec 2022 05:31), Max: 99.2 (19 Dec 2022 17:29)  HR: 108 (20 Dec 2022 05:31) (88 - 108)  BP: 115/71 (20 Dec 2022 05:31) (99/61 - 140/71)  BP(mean): --  RR: 18 (20 Dec 2022 05:31) (17 - 18)  SpO2: 98% (20 Dec 2022 05:31) (98% - 100%)    Parameters below as of 20 Dec 2022 05:31  Patient On (Oxygen Delivery Method): room air    MEDICATIONS  (STANDING):  allopurinol 300 milliGRAM(s) Oral daily  azaCITIDine Injectable (eMAR) 49.33 milliGRAM(s) SubCutaneous every 24 hours  azaCITIDine Injectable (eMAR) 49.33 milliGRAM(s) SubCutaneous every 24 hours  azaCITIDine Injectable (eMAR) 49.33 milliGRAM(s) SubCutaneous every 24 hours  Biotene Dry Mouth Oral Rinse 15 milliLiter(s) Swish and Spit three times a day  chlorhexidine 2% Cloths 1 Application(s) Topical daily  dextrose 5%. 1000 milliLiter(s) (100 mL/Hr) IV Continuous <Continuous>  dextrose 5%. 1000 milliLiter(s) (50 mL/Hr) IV Continuous <Continuous>  dextrose 50% Injectable 25 Gram(s) IV Push once  dextrose 50% Injectable 12.5 Gram(s) IV Push once  dextrose 50% Injectable 25 Gram(s) IV Push once  enalapril 20 milliGRAM(s) Oral daily  FIRST- Mouthwash  BLM 30 milliLiter(s) Swish and Spit four times a day  glucagon  Injectable 1 milliGRAM(s) IntraMuscular once  insulin lispro (ADMELOG) corrective regimen sliding scale   SubCutaneous three times a day before meals  insulin lispro (ADMELOG) corrective regimen sliding scale   SubCutaneous at bedtime  ondansetron Injectable 8 milliGRAM(s) IV Push every 24 hours  sodium chloride 0.9% lock flush 3 milliLiter(s) IV Push every 8 hours  sodium chloride 0.9%. 1000 milliLiter(s) (75 mL/Hr) IV Continuous <Continuous>  venetoclax 400 milliGRAM(s) Oral <User Schedule>      Assessment: 72 year old day 5 Aza/ricarda for TP53 MDS.    PMH: DM, HTN, HLD    Plan:  Heme: PLT goal > 10,000; Hgb goal > 7.0g/dL   azacitidine 75 mg/m2 x 7 days.   venetoclax 400 mg daily   alllopurinol     ID: does not require primary prophylaxis at this time.     Nutrition: tolerating PO    DVT prophylaxis: hold LWHx secondary low PLT count.     Over 35 minutes were spent in direct patient care and care coordination.  Planning discharge on day +7

## 2022-12-20 NOTE — PROGRESS NOTE ADULT - PROBLEM SELECTOR PLAN 1
12/16 - Started Vidaza 75mg/m2= 148mg subcutaneously qd x 7d, Venetoclax 100mg x1d, 200mg x1d, 400mg thereafter. Consent obtained.  IVF's, Strict I/O's, daily weights  Check daily CBC, electrolytes, TLS labs,  transfuse for Hgb<7.0, Plts <10k or 15k if febrile. Blood consent in chart.  Monitor for hemolysis with PRBC transfusions per blood bank.  12/20 Send for HLA typing while WBC still wnl

## 2022-12-20 NOTE — PROGRESS NOTE ADULT - NS ATTEND AMEND GEN_ALL_CORE FT
.    Primary: Damon     Vital Signs Last 24 Hrs  T(C): 36.9 (21 Dec 2022 05:10), Max: 37.3 (20 Dec 2022 16:52)  T(F): 98.4 (21 Dec 2022 05:10), Max: 99.1 (20 Dec 2022 16:52)  HR: 98 (21 Dec 2022 05:10) (85 - 108)  BP: 110/64 (21 Dec 2022 05:10) (110/64 - 129/77)  BP(mean): --  RR: 18 (21 Dec 2022 05:10) (18 - 18)  SpO2: 100% (21 Dec 2022 05:10) (98% - 100%)    Parameters below as of 21 Dec 2022 05:10  Patient On (Oxygen Delivery Method): room air    MEDICATIONS  (STANDING):  allopurinol 300 milliGRAM(s) Oral daily  azaCITIDine Injectable (eMAR) 49.33 milliGRAM(s) SubCutaneous every 24 hours  azaCITIDine Injectable (eMAR) 49.33 milliGRAM(s) SubCutaneous every 24 hours  azaCITIDine Injectable (eMAR) 49.33 milliGRAM(s) SubCutaneous every 24 hours  Biotene Dry Mouth Oral Rinse 15 milliLiter(s) Swish and Spit three times a day  chlorhexidine 2% Cloths 1 Application(s) Topical daily  dextrose 5%. 1000 milliLiter(s) (50 mL/Hr) IV Continuous <Continuous>  dextrose 5%. 1000 milliLiter(s) (100 mL/Hr) IV Continuous <Continuous>  dextrose 50% Injectable 25 Gram(s) IV Push once  dextrose 50% Injectable 12.5 Gram(s) IV Push once  dextrose 50% Injectable 25 Gram(s) IV Push once  enalapril 20 milliGRAM(s) Oral daily  FIRST- Mouthwash  BLM 30 milliLiter(s) Swish and Spit four times a day  glucagon  Injectable 1 milliGRAM(s) IntraMuscular once  insulin lispro (ADMELOG) corrective regimen sliding scale   SubCutaneous three times a day before meals  insulin lispro (ADMELOG) corrective regimen sliding scale   SubCutaneous at bedtime  ondansetron Injectable 8 milliGRAM(s) IV Push every 24 hours  sodium chloride 0.9% lock flush 3 milliLiter(s) IV Push every 8 hours  sodium chloride 0.9%. 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    PMH: DM, HTN, HLD    Plan:  Heme: PLT goal > 10,000; Hgb goal > 7.0g/dL   azacitidine 75 mg/m2 x 7 days.   venetoclax 400 mg daily   alllopurinol     ID: does not require primary prophylaxis at this time. When neutropenic: valtrex, posa, levaquine     Nutrition: tolerating PO    DVT prophylaxis: hold LWHx secondary low PLT count.     Over 35 minutes were spent in direct patient care and care coordination.  Planning discharge on day +7

## 2022-12-20 NOTE — PROGRESS NOTE ADULT - SUBJECTIVE AND OBJECTIVE BOX
Diagnosis: MDS    Protocol/Chemo Regimen: Vidaza/Venetoclax (Vidaza 75mg/m2= 148mg subcutaneously qd x 7d, Venetoclax 100mg x1d, 200mg x1d, 400mg therafter)  Day: 5     Pt endorsed: No overnight events, taking po's    Review of Systems: Denies n/v, HA or dizziness,     Pain scale:  0                         Diet: Diet, Regular (12-16-22 @ 08:06) [Active]    Allergies: No Known Allergies    MEDICATIONS  (STANDING):  allopurinol 300 milliGRAM(s) Oral daily  azaCITIDine Injectable (eMAR) 49.33 milliGRAM(s) SubCutaneous every 24 hours  azaCITIDine Injectable (eMAR) 49.33 milliGRAM(s) SubCutaneous every 24 hours  azaCITIDine Injectable (eMAR) 49.33 milliGRAM(s) SubCutaneous every 24 hours  Biotene Dry Mouth Oral Rinse 15 milliLiter(s) Swish and Spit three times a day  chlorhexidine 2% Cloths 1 Application(s) Topical daily  dextrose 5%. 1000 milliLiter(s) (50 mL/Hr) IV Continuous <Continuous>  dextrose 5%. 1000 milliLiter(s) (100 mL/Hr) IV Continuous <Continuous>  dextrose 50% Injectable 25 Gram(s) IV Push once  dextrose 50% Injectable 12.5 Gram(s) IV Push once  dextrose 50% Injectable 25 Gram(s) IV Push once  enalapril 20 milliGRAM(s) Oral daily  FIRST- Mouthwash  BLM 30 milliLiter(s) Swish and Spit four times a day  glucagon  Injectable 1 milliGRAM(s) IntraMuscular once  insulin lispro (ADMELOG) corrective regimen sliding scale   SubCutaneous three times a day before meals  insulin lispro (ADMELOG) corrective regimen sliding scale   SubCutaneous at bedtime  ondansetron Injectable 8 milliGRAM(s) IV Push every 24 hours  sodium chloride 0.9% lock flush 3 milliLiter(s) IV Push every 8 hours  sodium chloride 0.9%. 1000 milliLiter(s) (75 mL/Hr) IV Continuous <Continuous>  venetoclax 400 milliGRAM(s) Oral <User Schedule>    MEDICATIONS  (PRN):  acetaminophen     Tablet .. 650 milliGRAM(s) Oral every 6 hours PRN Temp greater or equal to 38C (100.4F), Mild Pain (1 - 3)  dextrose Oral Gel 15 Gram(s) Oral once PRN Blood Glucose LESS THAN 70 milliGRAM(s)/deciliter      Vital Signs Last 24 Hrs  T(C): 36.9 (20 Dec 2022 09:00), Max: 37.3 (19 Dec 2022 17:29)  T(F): 98.4 (20 Dec 2022 09:00), Max: 99.2 (19 Dec 2022 17:29)  HR: 96 (20 Dec 2022 09:00) (88 - 108)  BP: 118/68 (20 Dec 2022 09:00) (99/61 - 130/86)  BP(mean): --  RR: 18 (20 Dec 2022 09:00) (17 - 18)  SpO2: 98% (20 Dec 2022 09:00) (98% - 99%)    Parameters below as of 20 Dec 2022 09:00  Patient On (Oxygen Delivery Method): room air    I&O's Summary    19 Dec 2022 07:01  -  20 Dec 2022 07:00  --------------------------------------------------------  IN: 1186 mL / OUT: 900 mL / NET: 286 mL    20 Dec 2022 07:01  -  20 Dec 2022 12:47  --------------------------------------------------------  IN: 355 mL / OUT: 750 mL / NET: -395 mL      PHYSICAL EXAM  General: Sitting up in bed, NAD  HEENT: clear oropharynx, anicteric sclera, pink conjunctiva  CV: normal S1/S2, reg  Lungs: clear to auscultation bilaterally, no wheezes, no rales  Abdomen: +BS, soft non-tender non-distended  Ext: no edema  Skin: no rashes  Neuro: alert and oriented X 3  Central Line: N/A    LABS:                                   7.1    3.69  )-----------( 49       ( 20 Dec 2022 06:56 )             22.2     20 Dec 2022 06:56    138    |  104    |  15     ----------------------------<  146    4.1     |  26     |  1.21     Ca    9.2        20 Dec 2022 06:56  Phos  3.3       20 Dec 2022 06:56  Mg     1.6       20 Dec 2022 06:56    TPro  6.6    /  Alb  3.5    /  TBili  0.8    /  DBili  x      /  AST  27     /  ALT  39     /  AlkPhos  75     20 Dec 2022 06:56      POCT Blood Glucose.: 143 mg/dL (20 Dec 2022 12:01)  POCT Blood Glucose.: 163 mg/dL (20 Dec 2022 08:06)  POCT Blood Glucose.: 190 mg/dL (19 Dec 2022 21:52)  POCT Blood Glucose.: 173 mg/dL (19 Dec 2022 17:28)    LIVER FUNCTIONS - ( 20 Dec 2022 06:56 )  Alb: 3.5 g/dL / Pro: 6.6 g/dL / ALK PHOS: 75 U/L / ALT: 39 U/L / AST: 27 U/L / GGT: x             CULTURES:    None      RADIOLOGY & ADDITIONAL STUDIES:    None

## 2022-12-21 NOTE — PROGRESS NOTE ADULT - SUBJECTIVE AND OBJECTIVE BOX
Diagnosis: TP53+ MDS    Protocol/Chemo Regimen: Vidaza/Venetoclax (Vidaza 75mg/m2 = 148mg subcutaneously qd x 7d, Venetoclax 100mg x1d, 200mg x1d, 400mg thereafter  Day: 6     Pt endorsed: No overnight events, no complaints.    Review of Systems: Denies headache, dizziness, chest pain, shortness of breath, abdominal pain, nausea, vomiting diarrhea, constipation.    Pain scale: 0                       Diet: Diet, Regular     Allergies: No Known Allergies      HEME/ONC MEDS:  azaCITIDine Injectable (eMAR) 49.33 milliGRAM(s) SubCutaneous every 24 hours  azaCITIDine Injectable (eMAR) 49.33 milliGRAM(s) SubCutaneous every 24 hours  azaCITIDine Injectable (eMAR) 49.33 milliGRAM(s) SubCutaneous every 24 hours  venetoclax 400 milliGRAM(s) Oral <User Schedule>    MEDICATIONS  (STANDING):  allopurinol 300 milliGRAM(s) Oral daily  azaCITIDine Injectable (eMAR) 49.33 milliGRAM(s) SubCutaneous every 24 hours  azaCITIDine Injectable (eMAR) 49.33 milliGRAM(s) SubCutaneous every 24 hours  azaCITIDine Injectable (eMAR) 49.33 milliGRAM(s) SubCutaneous every 24 hours  Biotene Dry Mouth Oral Rinse 15 milliLiter(s) Swish and Spit three times a day  chlorhexidine 2% Cloths 1 Application(s) Topical daily  dextrose 5%. 1000 milliLiter(s) (100 mL/Hr) IV Continuous <Continuous>  dextrose 5%. 1000 milliLiter(s) (50 mL/Hr) IV Continuous <Continuous>  dextrose 50% Injectable 25 Gram(s) IV Push once  dextrose 50% Injectable 12.5 Gram(s) IV Push once  dextrose 50% Injectable 25 Gram(s) IV Push once  enalapril 20 milliGRAM(s) Oral daily  FIRST- Mouthwash  BLM 30 milliLiter(s) Swish and Spit four times a day  glucagon  Injectable 1 milliGRAM(s) IntraMuscular once  insulin lispro (ADMELOG) corrective regimen sliding scale   SubCutaneous three times a day before meals  insulin lispro (ADMELOG) corrective regimen sliding scale   SubCutaneous at bedtime  ondansetron Injectable 8 milliGRAM(s) IV Push every 24 hours  sodium chloride 0.9% lock flush 3 milliLiter(s) IV Push every 8 hours  sodium chloride 0.9%. 1000 milliLiter(s) (75 mL/Hr) IV Continuous <Continuous>  venetoclax 400 milliGRAM(s) Oral <User Schedule>    MEDICATIONS  (PRN):  acetaminophen     Tablet .. 650 milliGRAM(s) Oral every 6 hours PRN Temp greater or equal to 38C (100.4F), Mild Pain (1 - 3)  dextrose Oral Gel 15 Gram(s) Oral once PRN Blood Glucose LESS THAN 70 milliGRAM(s)/deciliter    Vital Signs Last 24 Hrs  T(C): 37.2 (21 Dec 2022 09:30), Max: 37.3 (20 Dec 2022 16:52)  T(F): 99 (21 Dec 2022 09:30), Max: 99.1 (20 Dec 2022 16:52)  HR: 96 (21 Dec 2022 09:30) (85 - 98)  BP: 111/72 (21 Dec 2022 09:30) (110/64 - 129/77)  RR: 18 (21 Dec 2022 09:30) (18 - 18)  SpO2: 99% (21 Dec 2022 09:30) (99% - 100%)    Parameters below as of 21 Dec 2022 09:30  Patient On (Oxygen Delivery Method): room air    PHYSICAL EXAM  General: NAD  HEENT: clear oropharynx, anicteric sclera, pink conjunctiva  CV: normal S1/S2, reg  Lungs: clear to auscultation bilaterally, no wheezes  Abdomen: +BS, soft non-tender, non-distended  Ext: no edema  Skin: no rashes  Neuro: alert and oriented X 3  Central Line: N/A      LABS:                         7.4    3.92  )-----------( 47       ( 21 Dec 2022 06:53 )             22.8     CBC Full  -  ( 21 Dec 2022 06:53 )  WBC Count : 3.92 K/uL  RBC Count : 2.56 M/uL  Hemoglobin : 7.4 g/dL  Hematocrit : 22.8 %  Platelet Count - Automated : 47 K/uL  Mean Cell Volume : 89.1 fl  Mean Cell Hemoglobin : 28.9 pg  Mean Cell Hemoglobin Concentration : 32.5 gm/dL  Auto Neutrophil # : 3.07 K/uL  Auto Lymphocyte # : 0.58 K/uL  Auto Monocyte # : 0.10 K/uL  Auto Eosinophil # : 0.00 K/uL  Auto Basophil # : 0.00 K/uL  Auto Neutrophil % : 78.2 %  Auto Lymphocyte % : 14.8 %  Auto Monocyte % : 2.6 %  Auto Eosinophil % : 0.0 %  Auto Basophil % : 0.0 %    12-21    139  |  103  |  13  ----------------------------<  162<H>  4.1   |  25  |  1.21    Ca    9.5      21 Dec 2022 06:53  Phos  3.5     12-21  Mg     1.8     12-21    TPro  7.3  /  Alb  4.1  /  TBili  0.9  /  DBili  x   /  AST  26  /  ALT  40  /  AlkPhos  80  12-21    Lactate Dehydrogenase, Serum: 392 U/L  Uric Acid, Serum: 3.4 mg/dL        CULTURES:  None    RADIOLOGY & ADDITIONAL STUDIES:  None

## 2022-12-21 NOTE — PROGRESS NOTE ADULT - PROBLEM SELECTOR PLAN 1
12/16 - Started Vidaza 75mg/m2= 148mg subcutaneously qd x 7d, Venetoclax 100mg x1d, 200mg x1d, 400mg thereafter. Consent obtained.  IVF's, Strict I/O's, daily weights  Check daily CBC, electrolytes, TLS labs,  transfuse for Hgb<7.0, Plts <10k or 15k if febrile. Blood consent in chart.  Monitor for hemolysis with PRBC transfusions per blood bank.  12/20 - HLA typing sent while WBC still wnl 12/16 - Started Vidaza 75mg/m2= 148mg subcutaneously qd x 7d, Venetoclax 100mg x1d, 200mg x1d, 400mg thereafter. Consent obtained.  IVF's, Strict I/O's, daily weights  Check daily CBC, electrolytes, TLS labs,  transfuse for Hgb<7.0, Plts <10k or 15k if febrile. Blood consent in chart.  Monitor for hemolysis with PRBC transfusions per blood bank.  12/20 - HLA typing sent while WBC still wnl  12/21- 1 unit PRBCs in anticipation of discharge.   Discharge planning tomorrow 12/22

## 2022-12-21 NOTE — PROGRESS NOTE ADULT - ASSESSMENT
73 y/o M with a PMH of DM2, HTN, and HLD, presented with 3 weeks of fatigue, poor PO intake and exertional dyspnea, found to have bi-cytopenia. 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 Now admitted for cycle 1 of Vidaza/Venetoclax. Patient pancytopenic due to disease and therapy.

## 2022-12-21 NOTE — PROGRESS NOTE ADULT - NS ATTEND AMEND GEN_ALL_CORE FT
.  Primary: Damon   Vital Signs Last 24 Hrs  T(C): 36.9 (21 Dec 2022 05:10), Max: 37.3 (20 Dec 2022 16:52)  T(F): 98.4 (21 Dec 2022 05:10), Max: 99.1 (20 Dec 2022 16:52)  HR: 98 (21 Dec 2022 05:10) (85 - 98)  BP: 110/64 (21 Dec 2022 05:10) (110/64 - 129/77)  BP(mean): --  RR: 18 (21 Dec 2022 05:10) (18 - 18)  SpO2: 100% (21 Dec 2022 05:10) (98% - 100%)    Parameters below as of 21 Dec 2022 05:10  Patient On (Oxygen Delivery Method): room air    MEDICATIONS  (STANDING):  allopurinol 300 milliGRAM(s) Oral daily  azaCITIDine Injectable (eMAR) 49.33 milliGRAM(s) SubCutaneous every 24 hours  azaCITIDine Injectable (eMAR) 49.33 milliGRAM(s) SubCutaneous every 24 hours  azaCITIDine Injectable (eMAR) 49.33 milliGRAM(s) SubCutaneous every 24 hours  Biotene Dry Mouth Oral Rinse 15 milliLiter(s) Swish and Spit three times a day  chlorhexidine 2% Cloths 1 Application(s) Topical daily  dextrose 5%. 1000 milliLiter(s) (100 mL/Hr) IV Continuous <Continuous>  dextrose 5%. 1000 milliLiter(s) (50 mL/Hr) IV Continuous <Continuous>  dextrose 50% Injectable 25 Gram(s) IV Push once  dextrose 50% Injectable 12.5 Gram(s) IV Push once  dextrose 50% Injectable 25 Gram(s) IV Push once  enalapril 20 milliGRAM(s) Oral daily  FIRST- Mouthwash  BLM 30 milliLiter(s) Swish and Spit four times a day  glucagon  Injectable 1 milliGRAM(s) IntraMuscular once  insulin lispro (ADMELOG) corrective regimen sliding scale   SubCutaneous three times a day before meals  insulin lispro (ADMELOG) corrective regimen sliding scale   SubCutaneous at bedtime  ondansetron Injectable 8 milliGRAM(s) IV Push every 24 hours  sodium chloride 0.9% lock flush 3 milliLiter(s) IV Push every 8 hours  sodium chloride 0.9%. 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    PMH: DM, HTN, HLD    Plan:  Heme: PLT goal > 10,000; Hgb goal > 7.0g/dL   azacitidine 75 mg/m2 x 7 days.   venetoclax 400 mg daily   alllopurinol     ID: does not require primary prophylaxis at this time.     Nutrition: tolerating PO    DVT prophylaxis: hold LWHx secondary low PLT count.     Over 35 minutes were spent in direct patient care and care coordination.  Planning discharge on day +7

## 2022-12-22 NOTE — ADVANCED PRACTICE NURSE CONSULT - ASSESSMENT
Pt A/Ox4, vital signs stable, afebrile. Pt denies pain/discomfort, N/V/D, SOB, chest pain. Chemotherapy teaching done. Pt verbalized understanding. Lab values reviewed by Dr. Winston and team during rounds, creatinine (1.17), AST (29), and ALT (43) increased compared to yesterday, okay to continue treatment plan. Pt premedicated with Zofran 8 mg IVP  given as premedication. Drug verification done by 2 RNs. Day3, azaCITIDine Injectable [Ordered as VIDAZA 75mg/m2= 148 milliGRAM(s), x3 49.33mg SubCutaneous injectables , every 24 hours,   Special Instructions: At 1547 Syringe 1/2 , subq given at LLQ abdomen .pt. tolerated .Band aid applied . No bleeding noted. Then on the LLQ region Syringe 2/3 same dose as above given at 1550. Tolerated well. Band aid applied, no bleeding noted.  Then at 1552 Dose 3/3 given on LLQ region, tolerated well. Band aid applied. No bleeding noted. Primary RN aware of present treatment. Left pt comfortable in bed. Call bell within reach, nursing care on-going. 
Pt admitted to [facility] today from home for chemotherapy. Pt A/Ox4, vital signs stable, afebrile. Pt denies pain/discomfort, N/V/D, SOB, chest pain. Chemotherapy teachings done. Pt verbalized understanding. Drug verification done by 2 RN.'s. Lab. values reviewed by DR López during rounds .  Pt received Zofran 8 mg IVP  as premedications. At 1616  ,azaCITIDine Injectable (eMAR) [Ordered as VIDAZA (eMAR)] - 75mg/m2= 148 milliGRAM(s), SubCutaneous, every 24 hours, Stop After 7 Doses  Special Instructions: Syringe 1/2 , subc  given at RLQ abdomen .pt. tolerated .Band aid applied . No bleeding noted. Then to the R umbilical region,  Syringe 2/2 same dose as above given at 1617. Tolerated well. Band aid applied, no bleeding noted.  Then, Dose 3/3 given to L umbilical region, tolerated well. Band aid applied. No bleeding noted.  Primary RN aware of present treatment. Left pt. comfortable in bed. 
Pt. seen in bed a/ox4,denies any discomfort at this time. Chemotherapy teachings done. Pt. verbalized understanding. Drug verification done by 2 RN.'s. Lab values reviewed by DR Winston during  rounds .  Pt. received zofran 8 mg IVP  as premedications.azaCITIDine Injectable (eMAR) [Ordered as VIDAZA (eMAR)] - Start Date: 16-Dec-2022  49.33 milliGRAM(s), SubCutaneous, every 24 hours, Stop After 7 Doses  Special Instructions: 3 Syringes = Total Dose 148mg  syringe 1 of 3  Administration Instructions: This is a Look-alike/Sound-alike Medication  CAUTION: HAZARDOUS DRUG  This is a High Alert Medication  Dispensed As:  azaCITIDine 100 milliGRAM(s) Injectable, 1.9732 milliLiter(s)  At 1601, Syringe 1/3 , subcutaneous given at right lower abdomen .pt tolerated, Band aid applied . No bleeding noted. Then followed with 2/3 injection subcutaneous given to the right lower outer abdomen, of the  first injection  .tolerated,no bleeding redness noted . Then Syringe 3/3 given at at the left lower abdomen. Pt. tolerated well .no redness,swelling noted . Tolerated well. Band aid applied to all injection site.  Primary RN aware of present treatment. Left pt comfortable in bed. Safety maintained. 
Pt. seen in bed a/ox4,denies any discomfort at this time. Chemotherapy teachings done. Pt. verbalized understanding. Drug verification done by 2 RN.'s. Lab values reviewed by DR Winston during  rounds .  Pt. received zofran 8 mg IVP  as premedications.azaCITIDine Injectable (eMAR) [Ordered as VIDAZA (eMAR)] - Start Date: 16-Dec-2022  49.33 milliGRAM(s), SubCutaneous, every 24 hours, Stop After 7 Doses  Special Instructions: 3 Syringes = Total Dose 148mg  syringe 1 of 3  Administration Instructions: This is a Look-alike/Sound-alike Medication  CAUTION: HAZARDOUS DRUG  This is a High Alert Medication  Dispensed As:  azaCITIDine 100 milliGRAM(s) Injectable, 1.9732 milliLiter(s)  At 1635, Syringe 1/3 , subcutaneous given at right lower abdomen .pt tolerated, Band aid applied . No bleeding noted. Then followed with 2/3 injection subcutaneous given to the left lower  abdomen ,tolerated,no bleeding or redness noted .Band aid applied . Then Syringe 3/3 given at at the right upper subcutaneous area . no redness,swelling noted . Tolerated well. Band aid applied to all injection site.  Primary RN aware of present treatment. Left pt comfortable in bed. Safety maintained. 
 Pt A/Ox4, vital signs stable, afebrile. Pt denies pain/discomfort, N/V/D, SOB, chest pain. Chemotherapy teaching done. Pt verbalized understanding. Drug verification done by 2 RNs. Lab values reviewed by DR Lassiter and team during rounds.  Zofran 8 mg IVP  given as premedication. At 1545  ,azaCITIDine Injectable [Ordered as VIDAZA 75mg/m2= 148 milliGRAM(s), x3 49.33mg SubCutaneous injectables , every 24 hours,   Special Instructions: AT 1550 Syringe 1/2 , subq  given at RLQ abdomen .pt. tolerated .Band aid applied . No bleeding noted. Then on the LLQ region Syringe 2/3 same dose as above given at 1553. Tolerated well. Band aid applied, no bleeding noted.  Then at 1557 Dose 3/3 given on LLQ region, tolerated well. Band aid applied. No bleeding noted.  Primary RN aware of present treatment. Left pt. comfortable in bed. Call bell within reach, nursing care on-going. 
 Pt A/Ox4, vital signs stable, afebrile. Pt denies pain/discomfort, N/V/D, SOB, chest pain. Chemotherapy teaching done. Pt verbalized understanding. Drug verification done by 2 RNs. Lab values reviewed by DR Lassiter and team during rounds. Hgb 6.8 pending PRBCs transfusion, ok to give Vidaza while patient waits for PRBCs. Zofran 8 mg IVP  given as premedication. At 1545  ,azaCITIDine Injectable [Ordered as VIDAZA 75mg/m2= 148 milliGRAM(s), x3 49.33mg SubCutaneous injectables , every 24 hours,   Special Instructions: Syringe 1/2 , subq  given at RLQ abdomen .pt. tolerated .Band aid applied . No bleeding noted. Then on the LLQ region Syringe 2/3 same dose as above given at 1548. Tolerated well. Band aid applied, no bleeding noted.  Then at 1551 Dose 3/3 given on LLQ region, tolerated well. Band aid applied. No bleeding noted.  Primary RN aware of present treatment. Left pt. comfortable in bed. Call bell within reach, nursing care on-going. 
P seen in bed a/ox4,denies any discomfort at this time. Chemotherapy teachings done. Pt. verbalized understanding. Drug verification done by 2 RN.'s. Lab values reviewed by DR Winston during  rounds .  Pt. received zofran 8 mg IVP  as premedications. At 15:27  ,azaCITIDine Injectable [Ordered as VIDAZA (eMAR)] - 75mg/m2= 148mg, Subcutaneous every 24 hours, Stop After 7 Doses  Special Instructions: Syringe 1 of 3 , subcutaneous given at right upper arm  .pt tolerated, Band aid applied . No bleeding noted. Then to the right upper arm, lower than first injection  Syringe 2/3 same dose as above given at 15:27. Tolerated well. Band aid applied .No bleeding noted . Dose 3/3 given to left upper arm, . Tolerated well. Band aid applied .No bleeding noted. Primary RN aware of present treatment. Left pt comfortable in bed.

## 2022-12-22 NOTE — DISCHARGE NOTE NURSING/CASE MANAGEMENT/SOCIAL WORK - NSDCFUADDAPPT_GEN_ALL_CORE_FT
Appointment with Dr Winston at [facility] on 12/23/22 at 12:00 pm    To [facility] for possible Platelet transfusion on Saturday 12/24 at 10:00 am.

## 2022-12-22 NOTE — DISCHARGE NOTE NURSING/CASE MANAGEMENT/SOCIAL WORK - PATIENT PORTAL LINK FT
You can access the FollowMyHealth Patient Portal offered by [facility] by registering at the following website: [email or web address]. By joining [employer] FollowMyHealth portal, you will also be able to view your health information using other applications (apps) compatible with our system.

## 2022-12-22 NOTE — PROGRESS NOTE ADULT - PROBLEM SELECTOR PLAN 1
12/16 - Started Vidaza 75mg/m2= 148mg subcutaneously qd x 7d, Venetoclax 100mg x1d, 200mg x1d, 400mg thereafter. Consent obtained.  IVF's, Strict I/O's, daily weights  Check daily CBC, electrolytes, TLS labs,  transfuse for Hgb<7.0, Plts <10k or 15k if febrile. Blood consent in chart.  Monitor for hemolysis with PRBC transfusions per blood bank.  12/20 - HLA typing sent while WBC still wnl  12/21- 1 unit PRBCs in anticipation of discharge.   Discharge planning tomorrow 12/22 12/16 - Started Vidaza 75mg/m2= 148mg subcutaneously qd x 7d, Venetoclax 100mg x1d, 200mg x1d, 400mg thereafter. Consent obtained.  IVF's, Strict I/O's, daily weights  Check daily CBC, electrolytes, TLS labs,  transfuse for Hgb<7.0, Plts <10k or 15k if febrile. Blood consent in chart.  Monitor for hemolysis with PRBC transfusions per blood bank.  12/20 - HLA typing sent while WBC still wnl  12/22 - 1/2 unit PRBCs in preparation of discharge today.

## 2022-12-22 NOTE — PROGRESS NOTE ADULT - ASSESSMENT
73 y/o M with a PMH of DM2, HTN, and HLD, presented with 3 weeks of fatigue, poor PO intake and exertional dyspnea, found to have bi-cytopenia. 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 Now admitted for cycle 1 of Vidaza/Venetoclax. Patient pancytopenic due to disease and therapy.  73 y/o M with a PMH of DM2, HTN, and HLD, presented with 3 weeks of fatigue, poor PO intake and exertional dyspnea, found to have bi-cytopenia. 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 Now admitted for cycle 1 of Vidaza/Venetoclax started on 12/16. Now stable for discharge. Patient pancytopenic due to disease and therapy.  71 y/o M with a PMH of DM2, HTN, and HLD, presented with 3 weeks of fatigue, poor PO intake and exertional dyspnea, found to have bi-cytopenia. 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 Now admitted for cycle 1 of Vidaza/Venetoclax started on 12/16. Now stable for discharge. Patient pancytopenic due to disease and chemotherapy.

## 2022-12-22 NOTE — PROGRESS NOTE ADULT - SUBJECTIVE AND OBJECTIVE BOX
Diagnosis: TP53+ MDS    Protocol/Chemo Regimen: Vidaza/Venetoclax (Vidaza 75mg/m2 = 148mg subcutaneously qd x 7d, Venetoclax 100mg x1d, 200mg x1d, 400mg thereafter  Day: 7     Pt endorsed: No overnight events, no complaints.    Review of Systems: Denies headache, dizziness, chest pain, shortness of breath, abdominal pain, nausea, vomiting diarrhea, constipation.    Pain scale: 0                       Diet: Diet, Regular     Allergies: No Known Allergies      HEME/ONC MEDS:  azaCITIDine Injectable (eMAR) 49.33 milliGRAM(s) SubCutaneous every 24 hours  azaCITIDine Injectable (eMAR) 49.33 milliGRAM(s) SubCutaneous every 24 hours  azaCITIDine Injectable (eMAR) 49.33 milliGRAM(s) SubCutaneous every 24 hours  venetoclax 400 milliGRAM(s) Oral <User Schedule>    MEDICATIONS  (STANDING):  allopurinol 300 milliGRAM(s) Oral daily  azaCITIDine Injectable (eMAR) 49.33 milliGRAM(s) SubCutaneous every 24 hours  azaCITIDine Injectable (eMAR) 49.33 milliGRAM(s) SubCutaneous every 24 hours  azaCITIDine Injectable (eMAR) 49.33 milliGRAM(s) SubCutaneous every 24 hours  Biotene Dry Mouth Oral Rinse 15 milliLiter(s) Swish and Spit three times a day  chlorhexidine 2% Cloths 1 Application(s) Topical daily  dextrose 5%. 1000 milliLiter(s) (100 mL/Hr) IV Continuous <Continuous>  dextrose 5%. 1000 milliLiter(s) (50 mL/Hr) IV Continuous <Continuous>  dextrose 50% Injectable 25 Gram(s) IV Push once  dextrose 50% Injectable 12.5 Gram(s) IV Push once  dextrose 50% Injectable 25 Gram(s) IV Push once  enalapril 20 milliGRAM(s) Oral daily  FIRST- Mouthwash  BLM 30 milliLiter(s) Swish and Spit four times a day  glucagon  Injectable 1 milliGRAM(s) IntraMuscular once  insulin lispro (ADMELOG) corrective regimen sliding scale   SubCutaneous three times a day before meals  insulin lispro (ADMELOG) corrective regimen sliding scale   SubCutaneous at bedtime  ondansetron Injectable 8 milliGRAM(s) IV Push every 24 hours  sodium chloride 0.9% lock flush 3 milliLiter(s) IV Push every 8 hours  sodium chloride 0.9%. 1000 milliLiter(s) (75 mL/Hr) IV Continuous <Continuous>  venetoclax 400 milliGRAM(s) Oral <User Schedule>    MEDICATIONS  (PRN):  acetaminophen     Tablet .. 650 milliGRAM(s) Oral every 6 hours PRN Temp greater or equal to 38C (100.4F), Mild Pain (1 - 3)  dextrose Oral Gel 15 Gram(s) Oral once PRN Blood Glucose LESS THAN 70 milliGRAM(s)/deciliter      Vital Signs Last 24 Hrs  T(C): 36.7 (22 Dec 2022 08:54), Max: 37.1 (21 Dec 2022 17:28)  T(F): 98.1 (22 Dec 2022 08:54), Max: 98.8 (21 Dec 2022 17:28)  HR: 90 (22 Dec 2022 08:54) (72 - 96)  BP: 118/78 (22 Dec 2022 08:54) (103/68 - 130/80)  BP(mean): 91 (22 Dec 2022 08:54) (91 - 91)  RR: 18 (22 Dec 2022 08:54) (16 - 18)  SpO2: 96% (22 Dec 2022 08:54) (96% - 100%)    Parameters below as of 22 Dec 2022 08:54  Patient On (Oxygen Delivery Method): room air      PHYSICAL EXAM  General: NAD  HEENT: clear oropharynx, anicteric sclera, pink conjunctiva  CV: normal S1/S2, reg  Lungs: clear to auscultation bilaterally, no wheezes  Abdomen: +BS, soft non-tender, non-distended  Ext: no edema  Skin: no rashes, peripheral IV in place L arm  Neuro: alert and oriented X 3  Central Line: N/A       LABS:         CULTURES:  None    RADIOLOGY & ADDITIONAL STUDIES:  None     Diagnosis: TP53+ MDS    Protocol/Chemo Regimen: Vidaza/Venetoclax (Vidaza 75mg/m2 = 148mg subcutaneously qd x 7d, Venetoclax 100mg x1d, 200mg x1d, 400mg thereafter  Day: 7     Pt endorsed: No overnight events, no complaints.    Review of Systems: Denies headache, dizziness, chest pain, shortness of breath, abdominal pain, nausea, vomiting diarrhea, constipation.    Pain scale: 0                       Diet: Diet, Regular     Allergies: No Known Allergies    HEME/ONC MEDS:  azaCITIDine Injectable (eMAR) 49.33 milliGRAM(s) SubCutaneous every 24 hours  azaCITIDine Injectable (eMAR) 49.33 milliGRAM(s) SubCutaneous every 24 hours  azaCITIDine Injectable (eMAR) 49.33 milliGRAM(s) SubCutaneous every 24 hours  venetoclax 400 milliGRAM(s) Oral <User Schedule>    MEDICATIONS  (STANDING):  allopurinol 300 milliGRAM(s) Oral daily  azaCITIDine Injectable (eMAR) 49.33 milliGRAM(s) SubCutaneous every 24 hours  azaCITIDine Injectable (eMAR) 49.33 milliGRAM(s) SubCutaneous every 24 hours  azaCITIDine Injectable (eMAR) 49.33 milliGRAM(s) SubCutaneous every 24 hours  Biotene Dry Mouth Oral Rinse 15 milliLiter(s) Swish and Spit three times a day  chlorhexidine 2% Cloths 1 Application(s) Topical daily  dextrose 5%. 1000 milliLiter(s) (100 mL/Hr) IV Continuous <Continuous>  dextrose 5%. 1000 milliLiter(s) (50 mL/Hr) IV Continuous <Continuous>  dextrose 50% Injectable 25 Gram(s) IV Push once  dextrose 50% Injectable 12.5 Gram(s) IV Push once  dextrose 50% Injectable 25 Gram(s) IV Push once  enalapril 20 milliGRAM(s) Oral daily  FIRST- Mouthwash  BLM 30 milliLiter(s) Swish and Spit four times a day  glucagon  Injectable 1 milliGRAM(s) IntraMuscular once  insulin lispro (ADMELOG) corrective regimen sliding scale   SubCutaneous three times a day before meals  insulin lispro (ADMELOG) corrective regimen sliding scale   SubCutaneous at bedtime  ondansetron Injectable 8 milliGRAM(s) IV Push every 24 hours  sodium chloride 0.9% lock flush 3 milliLiter(s) IV Push every 8 hours  sodium chloride 0.9%. 1000 milliLiter(s) (75 mL/Hr) IV Continuous <Continuous>  venetoclax 400 milliGRAM(s) Oral <User Schedule>    MEDICATIONS  (PRN):  acetaminophen     Tablet .. 650 milliGRAM(s) Oral every 6 hours PRN Temp greater or equal to 38C (100.4F), Mild Pain (1 - 3)  dextrose Oral Gel 15 Gram(s) Oral once PRN Blood Glucose LESS THAN 70 milliGRAM(s)/deciliter      Vital Signs Last 24 Hrs  T(C): 36.7 (22 Dec 2022 08:54), Max: 37.1 (21 Dec 2022 17:28)  T(F): 98.1 (22 Dec 2022 08:54), Max: 98.8 (21 Dec 2022 17:28)  HR: 90 (22 Dec 2022 08:54) (72 - 96)  BP: 118/78 (22 Dec 2022 08:54) (103/68 - 130/80)  BP(mean): 91 (22 Dec 2022 08:54) (91 - 91)  RR: 18 (22 Dec 2022 08:54) (16 - 18)  SpO2: 96% (22 Dec 2022 08:54) (96% - 100%)    Parameters below as of 22 Dec 2022 08:54  Patient On (Oxygen Delivery Method): room air      PHYSICAL EXAM  General: NAD  HEENT: clear oropharynx, anicteric sclera, pink conjunctiva  CV: normal S1/S2, reg  Lungs: clear to auscultation bilaterally, no wheezes  Abdomen: +BS, soft non-tender, non-distended  Ext: no edema  Skin: no rashes, peripheral IV in place L arm  Neuro: alert and oriented X 3  Central Line: N/A       LABS:                        8.3    3.67  )-----------( 43       ( 22 Dec 2022 07:02 )             25.6     CBC Full  -  ( 22 Dec 2022 07:02 )  WBC Count : 3.67 K/uL  RBC Count : 2.90 M/uL  Hemoglobin : 8.3 g/dL  Hematocrit : 25.6 %  Platelet Count - Automated : 43 K/uL  Mean Cell Volume : 88.3 fl  Mean Cell Hemoglobin : 28.6 pg  Mean Cell Hemoglobin Concentration : 32.4 gm/dL  Auto Neutrophil # : x  Auto Lymphocyte # : x  Auto Monocyte # : x  Auto Eosinophil # : x  Auto Basophil # : x  Auto Neutrophil % : x  Auto Lymphocyte % : x  Auto Monocyte % : x  Auto Eosinophil % : x  Auto Basophil % : x    12-22    139  |  105  |  13  ----------------------------<  149<H>  4.1   |  25  |  1.16    Ca    9.5      22 Dec 2022 07:01  Phos  3.0     12-22  Mg     1.6     12-22    TPro  7.1  /  Alb  3.7  /  TBili  1.5<H>  /  DBili  0.3  /  AST  25  /  ALT  40  /  AlkPhos  80  12-22    Bilirubin Direct, Serum: 0.3 mg/dL (12.22.22 @ 07:01)   Uric Acid, Serum: 3.2 mg/dL  Lactate Dehydrogenase, Serum: 368 U/L        CULTURES:  None    RADIOLOGY & ADDITIONAL STUDIES:  None

## 2022-12-22 NOTE — PROGRESS NOTE ADULT - PROBLEM SELECTOR PROBLEM 2
Infectious process

## 2022-12-22 NOTE — PROGRESS NOTE ADULT - PROBLEM SELECTOR PLAN 3
Hold OH's  Insulin sliding scale. 12/22 - Mild elevation in bilirubin (T bili 1.5). Planning to be discharged today, has follow up with Dr. Winston on 12/23.   Continue to monitor.

## 2022-12-22 NOTE — PROGRESS NOTE ADULT - NS ATTEST RISK PROBLEM GEN_ALL_CORE FT
Patient has high-risk MDS and is being treated with inpatient chemotherapy, which carries a risk for infection, bleeding, and other life-threatening complications.

## 2022-12-22 NOTE — ADVANCED PRACTICE NURSE CONSULT - REASON FOR CONSULT
Chemo notes : MDS, Vidaza Day 6/7
Chemo notes : MDS, Vidaza Day 7/7
MDS, Cycle 1, Day  Vidaza SQ  Consent in chart
MDS, Cycle 1, Day 1 Vidaza/Venetoclax  Consent in chart
MDS, Vidaza Day 4/7
MDS, Cycle 1, Day  Vidaza SQ  Consent in chart
MDS, Cycle 1, Day 2 Vidaza/Venetoclax  Consent in chart

## 2022-12-23 NOTE — PHYSICAL EXAM
[Ambulatory and capable of all self care but unable to carry out any work activities] : Status 2- Ambulatory and capable of all self care but unable to carry out any work activities. Up and about more than 50% of waking hours [Normal] : RRR, normal S1S2, no murmurs, rubs, gallops [de-identified] : t

## 2022-12-23 NOTE — ASSESSMENT
[FreeTextEntry1] : 71 yo M with a PMH of DM2, HTN, and HLD who presents with 3 weeks of fatigue, poor PO intake and exertional dyspnea, found to have anemia/thrombocytopenia diagnosed with Myelodysplastic syndrome with low blasts and increase p53 s/p Azacitidine and Venetoclex Induction now Day +8 presenting for f/u visit. \par \par Patient w/o complaints. States he's been eating and drinking, last BM today. Continues on Venetoclax treatment..  \par \par Hgb:10.4, Plts:50K , ANC: 3.32\par \par Plan:\par PLT Goal > 10,000; Hgb goal > 7.0g/dl\par Venetoclax 400mg daily \par ID: no ppx at this time \par Nutrition: Tolerating po\par DVT ppx: hold LWHx secondary to low PLT count \par Instructed patient to measure temperature three times a day and to call if temp >38C. \par Plan to RTC two times week for lab check and COVID PCR, next f/u planned for 12/27.\par \par \par \par \par

## 2022-12-23 NOTE — REASON FOR VISIT
[Follow-Up Visit] : a follow-up [Family Member] : family member [FreeTextEntry2] : Early Discharge Program Follow Up

## 2022-12-23 NOTE — HISTORY OF PRESENT ILLNESS
[de-identified] : 73 yo M with a PMH of DM2, HTN, and HLD who presents with 3 weeks of fatigue, poor PO intake and exertional dyspnea, found to have anemia/thrombocytopenia diagnosed with Myelodysplastic syndrome with low blasts and increase p53 s/p Azacitidine and Venetoclex Induction now Day +8 presenting for f/u visit. \par \par Patient w/o complaints. States he's been eating and drinking, last BM today. Continues on Venetoclax treatment.

## 2022-12-27 NOTE — PHYSICAL EXAM
[Ambulatory and capable of all self care but unable to carry out any work activities] : Status 2- Ambulatory and capable of all self care but unable to carry out any work activities. Up and about more than 50% of waking hours [Normal] : RRR, normal S1S2, no murmurs, rubs, gallops

## 2022-12-27 NOTE — HISTORY OF PRESENT ILLNESS
[de-identified] : 73 yo M with a PMH of DM2, HTN, and HLD who presents with 3 weeks of fatigue, poor PO intake and exertional dyspnea, found to have anemia/thrombocytopenia diagnosed with Myelodysplastic syndrome with low blasts and increase p53 s/p Azacitidine and Venetoclex Induction now Day +12 presenting for f/u visit. \par \par Patient s/o fatigue and decreased appetite. last BM today. Continues on Venetoclax treatment..

## 2022-12-27 NOTE — ASSESSMENT
[FreeTextEntry1] : 71 yo M with a PMH of DM2, HTN, and HLD who presents with 3 weeks of fatigue, poor PO intake and exertional dyspnea, found to have anemia/thrombocytopenia diagnosed with Myelodysplastic syndrome with low blasts and increase p53 s/p Azacitidine and Venetoclex Induction now Day +12 presenting for f/u visit. \par \par Patient s/o fatigue and decreased appetite. last BM today. Continues on Venetoclax treatment..  \par \par Hgb:9.4, Plts:42K , ANC: 2.29\par \par Plan:\par PLT Goal > 10,000; Hgb goal > 7.0g/dl\par Venetoclax 400mg daily \par ID: no ppx at this time \par Nutrition: Tolerating po\par DVT ppx: hold LWHx secondary to low PLT count \par Instructed patient to measure temperature three times a day and to call [facility] if temp >38C and go to the Emergency Room. \par Plan to RTC two times week for lab check and COVID PCR, next f/u planned for 12/30.\par \par \par \par \par

## 2022-12-30 PROBLEM — R09.89 RUNNY NOSE: Status: ACTIVE | Noted: 2022-01-01

## 2022-12-30 NOTE — ASSESSMENT
[FreeTextEntry1] : 71 yo M with a PMH of DM2, HTN, and HLD who presents with 3 weeks of fatigue, poor PO intake and exertional dyspnea, found to have anemia/thrombocytopenia diagnosed with Myelodysplastic syndrome with low blasts and increase p53 s/p Azacitidine and Venetoclex Induction now Day +15 presenting for f/u visit. \par \par Patient c/o increased fatigue especially with ADLs and runny nose x 1 day.Pt Also with decreased appetite, last BM today. Continues on Venetoclax treatment. States he was around a cousin who had a runny nose a few days ago. Denies fever, chills, n/v/d, cp, sob, sore throat, abdominal pain, bruising. On exam VSS, Lungs CTA b/l, HR:RRR, Oropharynx w/o erythema or exudates. Ordered RVP/COVID PCR , scheduled for 1 unit prbc for 12/31.\par \par \par Hgb:8.1, Plts:47K , ANC: 1.40\par \par Plan:\par PLT Goal > 10,000; Hgb goal > 7.0g/dl\par Venetoclax 400mg daily \par ID: no ppx at this time \par Nutrition: Tolerating po\par DVT ppx: hold LWHx secondary to low PLT count \par Instructed patient to measure temperature three times a day and to call Clinic if temp >38C and go to the Emergency Room. \par Plan to RTC two times week for lab check and COVID PCR. \par Pt scheduled for 1 unit Prbc on 12/31

## 2022-12-30 NOTE — HISTORY OF PRESENT ILLNESS
[de-identified] : 73 yo M with a PMH of DM2, HTN, and HLD who presents with 3 weeks of fatigue, poor PO intake and exertional dyspnea, found to have anemia/thrombocytopenia diagnosed with Myelodysplastic syndrome with low blasts and increase p53 s/p Azacitidine and Venetoclex Induction now Day +15 presenting for f/u visit. \par \par Patient c/o increased fatigue especially with ADLs and runny nose x 1 day.Pt Also with decreased appetite, last BM today. Continues on Venetoclax treatment. States he was around a cousin who had runny nose a few days ago. Denies fever, chills, n/v/d, cp, sob, sore throat, abdominal pain, bruising. On exam VSS, Lungs CTA b/l, HR:RRR, Oropharynx w/o erythema or exudates. Ordered RVP/COVID PCR , scheduled for 1 unit prbc for 12/31.\par \par

## 2023-01-01 ENCOUNTER — RESULT REVIEW (OUTPATIENT)
Age: 73
End: 2023-01-01

## 2023-01-01 ENCOUNTER — APPOINTMENT (OUTPATIENT)
Dept: INFUSION THERAPY | Facility: HOSPITAL | Age: 73
End: 2023-01-01

## 2023-01-01 ENCOUNTER — APPOINTMENT (OUTPATIENT)
Dept: HEMATOLOGY ONCOLOGY | Facility: CLINIC | Age: 73
End: 2023-01-01
Payer: MEDICARE

## 2023-01-01 ENCOUNTER — NON-APPOINTMENT (OUTPATIENT)
Age: 73
End: 2023-01-01

## 2023-01-01 ENCOUNTER — OUTPATIENT (OUTPATIENT)
Dept: OUTPATIENT SERVICES | Facility: HOSPITAL | Age: 73
LOS: 1 days | Discharge: HOME | End: 2023-01-01

## 2023-01-01 ENCOUNTER — OUTPATIENT (OUTPATIENT)
Dept: OUTPATIENT SERVICES | Facility: HOSPITAL | Age: 73
LOS: 1 days | End: 2023-01-01
Payer: MEDICARE

## 2023-01-01 ENCOUNTER — APPOINTMENT (OUTPATIENT)
Dept: HEMATOLOGY ONCOLOGY | Facility: CLINIC | Age: 73
End: 2023-01-01

## 2023-01-01 ENCOUNTER — APPOINTMENT (OUTPATIENT)
Dept: AFTER HOURS CARE | Facility: EMERGENCY ROOM | Age: 73
End: 2023-01-01
Payer: MEDICARE

## 2023-01-01 ENCOUNTER — APPOINTMENT (OUTPATIENT)
Dept: HEMATOLOGY ONCOLOGY | Facility: CLINIC | Age: 73
End: 2023-01-01
Payer: MEDICAID

## 2023-01-01 ENCOUNTER — INPATIENT (INPATIENT)
Facility: HOSPITAL | Age: 73
LOS: 1 days | DRG: 64 | End: 2023-05-29
Attending: EMERGENCY MEDICINE | Admitting: EMERGENCY MEDICINE
Payer: MEDICARE

## 2023-01-01 VITALS
DIASTOLIC BLOOD PRESSURE: 70 MMHG | HEART RATE: 98 BPM | TEMPERATURE: 97.3 F | HEIGHT: 68.5 IN | OXYGEN SATURATION: 100 % | WEIGHT: 167.09 LBS | BODY MASS INDEX: 25.03 KG/M2 | RESPIRATION RATE: 16 BRPM | SYSTOLIC BLOOD PRESSURE: 110 MMHG

## 2023-01-01 VITALS
TEMPERATURE: 97.3 F | WEIGHT: 150.77 LBS | SYSTOLIC BLOOD PRESSURE: 101 MMHG | HEART RATE: 95 BPM | BODY MASS INDEX: 22.59 KG/M2 | OXYGEN SATURATION: 100 % | DIASTOLIC BLOOD PRESSURE: 67 MMHG | HEIGHT: 68.5 IN | RESPIRATION RATE: 16 BRPM

## 2023-01-01 VITALS
HEIGHT: 68.5 IN | DIASTOLIC BLOOD PRESSURE: 74 MMHG | HEART RATE: 96 BPM | BODY MASS INDEX: 23.78 KG/M2 | WEIGHT: 158.73 LBS | SYSTOLIC BLOOD PRESSURE: 112 MMHG | OXYGEN SATURATION: 99 % | RESPIRATION RATE: 16 BRPM | TEMPERATURE: 97.4 F

## 2023-01-01 VITALS
OXYGEN SATURATION: 100 % | TEMPERATURE: 98 F | HEART RATE: 97 BPM | SYSTOLIC BLOOD PRESSURE: 118 MMHG | RESPIRATION RATE: 18 BRPM | DIASTOLIC BLOOD PRESSURE: 76 MMHG | HEIGHT: 68.5 IN

## 2023-01-01 VITALS — OXYGEN SATURATION: 100 % | HEART RATE: 90 BPM

## 2023-01-01 VITALS
WEIGHT: 155.4 LBS | HEART RATE: 95 BPM | DIASTOLIC BLOOD PRESSURE: 74 MMHG | SYSTOLIC BLOOD PRESSURE: 111 MMHG | RESPIRATION RATE: 16 BRPM | OXYGEN SATURATION: 100 % | TEMPERATURE: 97.2 F | BODY MASS INDEX: 23.28 KG/M2 | HEIGHT: 68.5 IN

## 2023-01-01 VITALS
RESPIRATION RATE: 16 BRPM | OXYGEN SATURATION: 99 % | WEIGHT: 177.47 LBS | HEART RATE: 104 BPM | TEMPERATURE: 97.1 F | BODY MASS INDEX: 26.59 KG/M2 | DIASTOLIC BLOOD PRESSURE: 78 MMHG | SYSTOLIC BLOOD PRESSURE: 138 MMHG

## 2023-01-01 DIAGNOSIS — I62.01 NONTRAUMATIC ACUTE SUBDURAL HEMORRHAGE: ICD-10-CM

## 2023-01-01 DIAGNOSIS — R50.9 FEVER, UNSPECIFIED: ICD-10-CM

## 2023-01-01 DIAGNOSIS — Z98.890 OTHER SPECIFIED POSTPROCEDURAL STATES: Chronic | ICD-10-CM

## 2023-01-01 DIAGNOSIS — R11.2 NAUSEA WITH VOMITING, UNSPECIFIED: ICD-10-CM

## 2023-01-01 DIAGNOSIS — R52 PAIN, UNSPECIFIED: ICD-10-CM

## 2023-01-01 DIAGNOSIS — R06.00 DYSPNEA, UNSPECIFIED: ICD-10-CM

## 2023-01-01 DIAGNOSIS — Z51.11 ENCOUNTER FOR ANTINEOPLASTIC CHEMOTHERAPY: ICD-10-CM

## 2023-01-01 DIAGNOSIS — U07.1 COVID-19: ICD-10-CM

## 2023-01-01 DIAGNOSIS — D46.9 MYELODYSPLASTIC SYNDROME, UNSPECIFIED: ICD-10-CM

## 2023-01-01 DIAGNOSIS — Z98.890 OTHER SPECIFIED POSTPROCEDURAL STATES: ICD-10-CM

## 2023-01-01 DIAGNOSIS — C46.9 KAPOSI'S SARCOMA, UNSPECIFIED: ICD-10-CM

## 2023-01-01 DIAGNOSIS — D64.9 ANEMIA, UNSPECIFIED: ICD-10-CM

## 2023-01-01 DIAGNOSIS — B37.0 CANDIDAL STOMATITIS: ICD-10-CM

## 2023-01-01 DIAGNOSIS — R68.89 OTHER GENERAL SYMPTOMS AND SIGNS: ICD-10-CM

## 2023-01-01 DIAGNOSIS — Z51.89 ENCOUNTER FOR OTHER SPECIFIED AFTERCARE: ICD-10-CM

## 2023-01-01 DIAGNOSIS — Z71.89 OTHER SPECIFIED COUNSELING: ICD-10-CM

## 2023-01-01 DIAGNOSIS — Z51.5 ENCOUNTER FOR PALLIATIVE CARE: ICD-10-CM

## 2023-01-01 DIAGNOSIS — R53.2 FUNCTIONAL QUADRIPLEGIA: ICD-10-CM

## 2023-01-01 LAB
A1C WITH ESTIMATED AVERAGE GLUCOSE RESULT: 6.5 % — HIGH (ref 4–5.6)
ACANTHOCYTES BLD QL SMEAR: SLIGHT — SIGNIFICANT CHANGE UP
ALBUMIN SERPL ELPH-MCNC: 3.2 G/DL — LOW (ref 3.3–5)
ALBUMIN SERPL ELPH-MCNC: 3.4 G/DL — SIGNIFICANT CHANGE UP (ref 3.3–5)
ALBUMIN SERPL ELPH-MCNC: 3.9 G/DL
ALBUMIN SERPL ELPH-MCNC: 3.9 G/DL
ALBUMIN SERPL ELPH-MCNC: 4 G/DL
ALBUMIN SERPL ELPH-MCNC: 4.1 G/DL
ALBUMIN SERPL ELPH-MCNC: 4.1 G/DL
ALBUMIN SERPL ELPH-MCNC: 4.2 G/DL — SIGNIFICANT CHANGE UP (ref 3.3–5)
ALBUMIN SERPL ELPH-MCNC: 4.3 G/DL
ALP BLD-CCNC: 103 U/L
ALP BLD-CCNC: 106 U/L
ALP BLD-CCNC: 72 U/L
ALP BLD-CCNC: 79 U/L
ALP BLD-CCNC: 83 U/L
ALP BLD-CCNC: 86 U/L
ALP SERPL-CCNC: 105 U/L — SIGNIFICANT CHANGE UP (ref 40–120)
ALP SERPL-CCNC: 114 U/L — SIGNIFICANT CHANGE UP (ref 40–120)
ALP SERPL-CCNC: 88 U/L — SIGNIFICANT CHANGE UP (ref 40–120)
ALT FLD-CCNC: 34 U/L — SIGNIFICANT CHANGE UP (ref 10–45)
ALT FLD-CCNC: 40 U/L — SIGNIFICANT CHANGE UP (ref 10–45)
ALT FLD-CCNC: 41 U/L — SIGNIFICANT CHANGE UP (ref 10–45)
ALT SERPL-CCNC: 18 U/L
ALT SERPL-CCNC: 19 U/L
ALT SERPL-CCNC: 22 U/L
ALT SERPL-CCNC: 23 U/L
ALT SERPL-CCNC: 35 U/L
ALT SERPL-CCNC: 36 U/L
ANION GAP SERPL CALC-SCNC: 10 MMOL/L
ANION GAP SERPL CALC-SCNC: 11 MMOL/L
ANION GAP SERPL CALC-SCNC: 12 MMOL/L
ANION GAP SERPL CALC-SCNC: 15 MMOL/L
ANION GAP SERPL CALC-SCNC: 15 MMOL/L — SIGNIFICANT CHANGE UP (ref 5–17)
ANION GAP SERPL CALC-SCNC: 17 MMOL/L — SIGNIFICANT CHANGE UP (ref 5–17)
ANION GAP SERPL CALC-SCNC: 9 MMOL/L — SIGNIFICANT CHANGE UP (ref 5–17)
ANISOCYTOSIS BLD QL: SLIGHT — SIGNIFICANT CHANGE UP
APPEARANCE UR: CLEAR — SIGNIFICANT CHANGE UP
APTT BLD: 19.2 SEC — LOW (ref 27.5–35.5)
AST SERPL-CCNC: 109 U/L — HIGH (ref 10–40)
AST SERPL-CCNC: 13 U/L
AST SERPL-CCNC: 141 U/L — HIGH (ref 10–40)
AST SERPL-CCNC: 15 U/L
AST SERPL-CCNC: 17 U/L
AST SERPL-CCNC: 17 U/L
AST SERPL-CCNC: 24 U/L — SIGNIFICANT CHANGE UP (ref 10–40)
AST SERPL-CCNC: 42 U/L
AST SERPL-CCNC: 43 U/L
BACTERIA # UR AUTO: NEGATIVE — SIGNIFICANT CHANGE UP
BASE EXCESS BLDV CALC-SCNC: -2.1 MMOL/L — LOW (ref -2–3)
BASE EXCESS BLDV CALC-SCNC: 0.4 MMOL/L — SIGNIFICANT CHANGE UP (ref -2–3)
BASOPHILS # BLD AUTO: 0 K/UL — SIGNIFICANT CHANGE UP (ref 0–0.2)
BASOPHILS # BLD AUTO: 0.01 K/UL — SIGNIFICANT CHANGE UP (ref 0–0.2)
BASOPHILS # BLD AUTO: 0.01 K/UL — SIGNIFICANT CHANGE UP (ref 0–0.2)
BASOPHILS NFR BLD AUTO: 0 % — SIGNIFICANT CHANGE UP (ref 0–2)
BASOPHILS NFR BLD AUTO: 1 % — SIGNIFICANT CHANGE UP (ref 0–2)
BASOPHILS NFR BLD AUTO: 1.4 % — SIGNIFICANT CHANGE UP (ref 0–2)
BILIRUB SERPL-MCNC: 0.8 MG/DL
BILIRUB SERPL-MCNC: 0.9 MG/DL
BILIRUB SERPL-MCNC: 1.2 MG/DL
BILIRUB SERPL-MCNC: 1.2 MG/DL
BILIRUB SERPL-MCNC: 1.2 MG/DL — SIGNIFICANT CHANGE UP (ref 0.2–1.2)
BILIRUB SERPL-MCNC: 1.4 MG/DL
BILIRUB SERPL-MCNC: 1.4 MG/DL
BILIRUB SERPL-MCNC: 2 MG/DL — HIGH (ref 0.2–1.2)
BILIRUB SERPL-MCNC: 2.2 MG/DL — HIGH (ref 0.2–1.2)
BILIRUB UR-MCNC: NEGATIVE — SIGNIFICANT CHANGE UP
BLASTS # FLD: 1 % — HIGH (ref 0–0)
BLASTS # FLD: 1 % — HIGH (ref 0–0)
BUN SERPL-MCNC: 13 MG/DL
BUN SERPL-MCNC: 13 MG/DL — SIGNIFICANT CHANGE UP (ref 7–23)
BUN SERPL-MCNC: 14 MG/DL
BUN SERPL-MCNC: 14 MG/DL
BUN SERPL-MCNC: 15 MG/DL
BUN SERPL-MCNC: 16 MG/DL
BUN SERPL-MCNC: 16 MG/DL
BUN SERPL-MCNC: 17 MG/DL
BUN SERPL-MCNC: 22 MG/DL — SIGNIFICANT CHANGE UP (ref 7–23)
BUN SERPL-MCNC: 23 MG/DL — SIGNIFICANT CHANGE UP (ref 7–23)
CA-I SERPL-SCNC: 1.24 MMOL/L — SIGNIFICANT CHANGE UP (ref 1.15–1.33)
CA-I SERPL-SCNC: 1.25 MMOL/L — SIGNIFICANT CHANGE UP (ref 1.15–1.33)
CALCIUM SERPL-MCNC: 8.7 MG/DL — SIGNIFICANT CHANGE UP (ref 8.4–10.5)
CALCIUM SERPL-MCNC: 9.1 MG/DL
CALCIUM SERPL-MCNC: 9.2 MG/DL
CALCIUM SERPL-MCNC: 9.3 MG/DL
CALCIUM SERPL-MCNC: 9.3 MG/DL
CALCIUM SERPL-MCNC: 9.5 MG/DL
CALCIUM SERPL-MCNC: 9.5 MG/DL
CALCIUM SERPL-MCNC: 9.5 MG/DL — SIGNIFICANT CHANGE UP (ref 8.4–10.5)
CALCIUM SERPL-MCNC: 9.6 MG/DL
CALCIUM SERPL-MCNC: 9.9 MG/DL — SIGNIFICANT CHANGE UP (ref 8.4–10.5)
CHLORIDE BLDV-SCNC: 103 MMOL/L — SIGNIFICANT CHANGE UP (ref 96–108)
CHLORIDE BLDV-SCNC: 104 MMOL/L — SIGNIFICANT CHANGE UP (ref 96–108)
CHLORIDE SERPL-SCNC: 100 MMOL/L
CHLORIDE SERPL-SCNC: 100 MMOL/L — SIGNIFICANT CHANGE UP (ref 96–108)
CHLORIDE SERPL-SCNC: 101 MMOL/L
CHLORIDE SERPL-SCNC: 101 MMOL/L
CHLORIDE SERPL-SCNC: 101 MMOL/L — SIGNIFICANT CHANGE UP (ref 96–108)
CHLORIDE SERPL-SCNC: 101 MMOL/L — SIGNIFICANT CHANGE UP (ref 96–108)
CHLORIDE SERPL-SCNC: 102 MMOL/L
CHLORIDE SERPL-SCNC: 103 MMOL/L
CHLORIDE SERPL-SCNC: 97 MMOL/L
CHLORIDE SERPL-SCNC: 99 MMOL/L
CK SERPL-CCNC: 70 U/L — SIGNIFICANT CHANGE UP (ref 30–200)
CO2 BLDV-SCNC: 25 MMOL/L — SIGNIFICANT CHANGE UP (ref 22–26)
CO2 BLDV-SCNC: 26 MMOL/L — SIGNIFICANT CHANGE UP (ref 22–26)
CO2 SERPL-SCNC: 21 MMOL/L — LOW (ref 22–31)
CO2 SERPL-SCNC: 22 MMOL/L — SIGNIFICANT CHANGE UP (ref 22–31)
CO2 SERPL-SCNC: 24 MMOL/L
CO2 SERPL-SCNC: 26 MMOL/L
CO2 SERPL-SCNC: 28 MMOL/L
CO2 SERPL-SCNC: 28 MMOL/L
CO2 SERPL-SCNC: 28 MMOL/L — SIGNIFICANT CHANGE UP (ref 22–31)
COLOR SPEC: YELLOW — SIGNIFICANT CHANGE UP
CORONAVIRUS (229E,HKU1,NL63,OC43): DETECTED
CREAT SERPL-MCNC: 0.86 MG/DL — SIGNIFICANT CHANGE UP (ref 0.5–1.3)
CREAT SERPL-MCNC: 0.91 MG/DL — SIGNIFICANT CHANGE UP (ref 0.5–1.3)
CREAT SERPL-MCNC: 0.92 MG/DL
CREAT SERPL-MCNC: 0.93 MG/DL
CREAT SERPL-MCNC: 0.93 MG/DL
CREAT SERPL-MCNC: 0.97 MG/DL
CREAT SERPL-MCNC: 0.97 MG/DL — SIGNIFICANT CHANGE UP (ref 0.5–1.3)
CREAT SERPL-MCNC: 1 MG/DL
CREAT SERPL-MCNC: 1.06 MG/DL
CREAT SERPL-MCNC: 1.11 MG/DL
CULTURE RESULTS: SIGNIFICANT CHANGE UP
DACRYOCYTES BLD QL SMEAR: SLIGHT — SIGNIFICANT CHANGE UP
DIFF PNL FLD: ABNORMAL
EGFR: 71 ML/MIN/1.73M2
EGFR: 75 ML/MIN/1.73M2
EGFR: 79 ML/MIN/1.73M2
EGFR: 82 ML/MIN/1.73M2 — SIGNIFICANT CHANGE UP
EGFR: 83 ML/MIN/1.73M2
EGFR: 87 ML/MIN/1.73M2
EGFR: 87 ML/MIN/1.73M2
EGFR: 88 ML/MIN/1.73M2
EGFR: 89 ML/MIN/1.73M2 — SIGNIFICANT CHANGE UP
EGFR: 91 ML/MIN/1.73M2 — SIGNIFICANT CHANGE UP
ELLIPTOCYTES BLD QL SMEAR: SLIGHT — SIGNIFICANT CHANGE UP
EOSINOPHIL # BLD AUTO: 0 K/UL — SIGNIFICANT CHANGE UP (ref 0–0.5)
EOSINOPHIL # BLD AUTO: 0.01 K/UL — SIGNIFICANT CHANGE UP (ref 0–0.5)
EOSINOPHIL NFR BLD AUTO: 0 % — SIGNIFICANT CHANGE UP (ref 0–6)
EOSINOPHIL NFR BLD AUTO: 1 % — SIGNIFICANT CHANGE UP (ref 0–6)
EPI CELLS # UR: 1 /HPF — SIGNIFICANT CHANGE UP
ESTIMATED AVERAGE GLUCOSE: 140 MG/DL — HIGH (ref 68–114)
GAS PNL BLDV: 136 MMOL/L — SIGNIFICANT CHANGE UP (ref 136–145)
GAS PNL BLDV: 137 MMOL/L — SIGNIFICANT CHANGE UP (ref 136–145)
GAS PNL BLDV: SIGNIFICANT CHANGE UP
GLUCOSE BLDC GLUCOMTR-MCNC: 211 MG/DL — HIGH (ref 70–99)
GLUCOSE BLDV-MCNC: 212 MG/DL — HIGH (ref 70–99)
GLUCOSE BLDV-MCNC: 250 MG/DL — HIGH (ref 70–99)
GLUCOSE SERPL-MCNC: 104 MG/DL
GLUCOSE SERPL-MCNC: 126 MG/DL — HIGH (ref 70–99)
GLUCOSE SERPL-MCNC: 129 MG/DL
GLUCOSE SERPL-MCNC: 153 MG/DL
GLUCOSE SERPL-MCNC: 162 MG/DL
GLUCOSE SERPL-MCNC: 167 MG/DL
GLUCOSE SERPL-MCNC: 171 MG/DL
GLUCOSE SERPL-MCNC: 177 MG/DL
GLUCOSE SERPL-MCNC: 198 MG/DL — HIGH (ref 70–99)
GLUCOSE SERPL-MCNC: 242 MG/DL — HIGH (ref 70–99)
GLUCOSE UR QL: NEGATIVE — SIGNIFICANT CHANGE UP
GRAM STN FLD: SIGNIFICANT CHANGE UP
HCO3 BLDV-SCNC: 24 MMOL/L — SIGNIFICANT CHANGE UP (ref 22–29)
HCO3 BLDV-SCNC: 24 MMOL/L — SIGNIFICANT CHANGE UP (ref 22–29)
HCT VFR BLD CALC: 16.5 % — CRITICAL LOW (ref 39–50)
HCT VFR BLD CALC: 17.2 % — CRITICAL LOW (ref 39–50)
HCT VFR BLD CALC: 18.7 % — CRITICAL LOW (ref 39–50)
HCT VFR BLD CALC: 19.6 % — CRITICAL LOW (ref 39–50)
HCT VFR BLD CALC: 19.7 % — CRITICAL LOW (ref 39–50)
HCT VFR BLD CALC: 19.8 % — CRITICAL LOW (ref 39–50)
HCT VFR BLD CALC: 19.9 % — CRITICAL LOW (ref 39–50)
HCT VFR BLD CALC: 20.2 % — CRITICAL LOW (ref 39–50)
HCT VFR BLD CALC: 20.2 % — CRITICAL LOW (ref 39–50)
HCT VFR BLD CALC: 20.4 % — CRITICAL LOW (ref 39–50)
HCT VFR BLD CALC: 20.7 % — CRITICAL LOW (ref 39–50)
HCT VFR BLD CALC: 20.8 % — CRITICAL LOW (ref 39–50)
HCT VFR BLD CALC: 20.9 % — CRITICAL LOW (ref 39–50)
HCT VFR BLD CALC: 21.3 % — LOW (ref 39–50)
HCT VFR BLD CALC: 21.4 % — LOW (ref 39–50)
HCT VFR BLD CALC: 21.4 % — LOW (ref 39–50)
HCT VFR BLD CALC: 21.5 % — LOW (ref 39–50)
HCT VFR BLD CALC: 21.5 % — LOW (ref 39–50)
HCT VFR BLD CALC: 21.7 % — LOW (ref 39–50)
HCT VFR BLD CALC: 21.8 % — LOW (ref 39–50)
HCT VFR BLD CALC: 21.9 % — LOW (ref 39–50)
HCT VFR BLD CALC: 22 % — LOW (ref 39–50)
HCT VFR BLD CALC: 22.2 % — LOW (ref 39–50)
HCT VFR BLD CALC: 22.3 % — LOW (ref 39–50)
HCT VFR BLD CALC: 22.3 % — LOW (ref 39–50)
HCT VFR BLD CALC: 22.6 % — LOW (ref 39–50)
HCT VFR BLD CALC: 22.8 % — LOW (ref 39–50)
HCT VFR BLD CALC: 23.1 % — LOW (ref 39–50)
HCT VFR BLD CALC: 23.3 % — LOW (ref 39–50)
HCT VFR BLD CALC: 23.6 % — LOW (ref 39–50)
HCT VFR BLD CALC: 23.8 % — LOW (ref 39–50)
HCT VFR BLD CALC: 24 % — LOW (ref 39–50)
HCT VFR BLD CALC: 24.2 % — LOW (ref 39–50)
HCT VFR BLD CALC: 24.3 % — LOW (ref 39–50)
HCT VFR BLD CALC: 24.4 % — LOW (ref 39–50)
HCT VFR BLD CALC: 24.5 % — LOW (ref 39–50)
HCT VFR BLD CALC: 24.8 % — LOW (ref 39–50)
HCT VFR BLD CALC: 24.9 % — LOW (ref 39–50)
HCT VFR BLD CALC: 25 % — LOW (ref 39–50)
HCT VFR BLD CALC: 25.7 % — LOW (ref 39–50)
HCT VFR BLD CALC: 28.5 % — LOW (ref 39–50)
HCT VFR BLDA CALC: 17 % — CRITICAL LOW (ref 39–51)
HCT VFR BLDA CALC: 22 % — LOW (ref 39–51)
HGB BLD CALC-MCNC: 5.8 G/DL — CRITICAL LOW (ref 12.6–17.4)
HGB BLD CALC-MCNC: 7.3 G/DL — LOW (ref 12.6–17.4)
HGB BLD-MCNC: 5.6 G/DL — CRITICAL LOW (ref 13–17)
HGB BLD-MCNC: 6.1 G/DL — CRITICAL LOW (ref 13–17)
HGB BLD-MCNC: 6.6 G/DL — CRITICAL LOW (ref 13–17)
HGB BLD-MCNC: 6.7 G/DL — CRITICAL LOW (ref 13–17)
HGB BLD-MCNC: 6.8 G/DL — CRITICAL LOW (ref 13–17)
HGB BLD-MCNC: 6.9 G/DL — CRITICAL LOW (ref 13–17)
HGB BLD-MCNC: 7 G/DL — CRITICAL LOW (ref 13–17)
HGB BLD-MCNC: 7.1 G/DL — LOW (ref 13–17)
HGB BLD-MCNC: 7.1 G/DL — LOW (ref 13–17)
HGB BLD-MCNC: 7.2 G/DL — LOW (ref 13–17)
HGB BLD-MCNC: 7.3 G/DL — LOW (ref 13–17)
HGB BLD-MCNC: 7.4 G/DL — LOW (ref 13–17)
HGB BLD-MCNC: 7.5 G/DL — LOW (ref 13–17)
HGB BLD-MCNC: 7.6 G/DL — LOW (ref 13–17)
HGB BLD-MCNC: 7.7 G/DL — LOW (ref 13–17)
HGB BLD-MCNC: 7.8 G/DL — LOW (ref 13–17)
HGB BLD-MCNC: 8 G/DL — LOW (ref 13–17)
HGB BLD-MCNC: 8 G/DL — LOW (ref 13–17)
HGB BLD-MCNC: 8.1 G/DL — LOW (ref 13–17)
HGB BLD-MCNC: 8.2 G/DL — LOW (ref 13–17)
HGB BLD-MCNC: 8.3 G/DL — LOW (ref 13–17)
HGB BLD-MCNC: 8.4 G/DL — LOW (ref 13–17)
HGB BLD-MCNC: 8.4 G/DL — LOW (ref 13–17)
HGB BLD-MCNC: 8.5 G/DL — LOW (ref 13–17)
HGB BLD-MCNC: 8.5 G/DL — LOW (ref 13–17)
HGB BLD-MCNC: 8.6 G/DL — LOW (ref 13–17)
HGB BLD-MCNC: 8.7 G/DL — LOW (ref 13–17)
HGB BLD-MCNC: 9.5 G/DL — LOW (ref 13–17)
HYALINE CASTS # UR AUTO: 1 /LPF — SIGNIFICANT CHANGE UP (ref 0–2)
HYPOCHROMIA BLD QL: SLIGHT — SIGNIFICANT CHANGE UP
IMM GRANULOCYTES NFR BLD AUTO: 1.4 % — HIGH (ref 0–0.9)
IMM GRANULOCYTES NFR BLD AUTO: 2 % — HIGH (ref 0–0.9)
IMM GRANULOCYTES NFR BLD AUTO: 2.7 % — HIGH (ref 0–0.9)
IMM GRANULOCYTES NFR BLD AUTO: 3.3 % — HIGH (ref 0–0.9)
IMM GRANULOCYTES NFR BLD AUTO: 5.8 % — HIGH (ref 0–0.9)
INR BLD: 1.33 RATIO — HIGH (ref 0.88–1.16)
KETONES UR-MCNC: NEGATIVE — SIGNIFICANT CHANGE UP
LACTATE BLDV-MCNC: 2 MMOL/L — SIGNIFICANT CHANGE UP (ref 0.5–2)
LACTATE BLDV-MCNC: 2.4 MMOL/L — HIGH (ref 0.5–2)
LDH SERPL-CCNC: 205 U/L
LDH SERPL-CCNC: 215 U/L
LDH SERPL-CCNC: 223 U/L
LDH SERPL-CCNC: 530 U/L
LEUKOCYTE ESTERASE UR-ACNC: NEGATIVE — SIGNIFICANT CHANGE UP
LYMPHOCYTES # BLD AUTO: 0.22 K/UL — LOW (ref 1–3.3)
LYMPHOCYTES # BLD AUTO: 0.25 K/UL — LOW (ref 1–3.3)
LYMPHOCYTES # BLD AUTO: 0.27 K/UL — LOW (ref 1–3.3)
LYMPHOCYTES # BLD AUTO: 0.29 K/UL — LOW (ref 1–3.3)
LYMPHOCYTES # BLD AUTO: 0.3 K/UL — LOW (ref 1–3.3)
LYMPHOCYTES # BLD AUTO: 0.31 K/UL — LOW (ref 1–3.3)
LYMPHOCYTES # BLD AUTO: 0.31 K/UL — LOW (ref 1–3.3)
LYMPHOCYTES # BLD AUTO: 0.33 K/UL — LOW (ref 1–3.3)
LYMPHOCYTES # BLD AUTO: 0.34 K/UL — LOW (ref 1–3.3)
LYMPHOCYTES # BLD AUTO: 0.35 K/UL — LOW (ref 1–3.3)
LYMPHOCYTES # BLD AUTO: 0.36 K/UL — LOW (ref 1–3.3)
LYMPHOCYTES # BLD AUTO: 0.37 K/UL — LOW (ref 1–3.3)
LYMPHOCYTES # BLD AUTO: 0.38 K/UL — LOW (ref 1–3.3)
LYMPHOCYTES # BLD AUTO: 0.4 K/UL — LOW (ref 1–3.3)
LYMPHOCYTES # BLD AUTO: 0.41 K/UL — LOW (ref 1–3.3)
LYMPHOCYTES # BLD AUTO: 0.42 K/UL — LOW (ref 1–3.3)
LYMPHOCYTES # BLD AUTO: 0.42 K/UL — LOW (ref 1–3.3)
LYMPHOCYTES # BLD AUTO: 0.43 K/UL — LOW (ref 1–3.3)
LYMPHOCYTES # BLD AUTO: 0.44 K/UL — LOW (ref 1–3.3)
LYMPHOCYTES # BLD AUTO: 0.44 K/UL — LOW (ref 1–3.3)
LYMPHOCYTES # BLD AUTO: 0.45 K/UL — LOW (ref 1–3.3)
LYMPHOCYTES # BLD AUTO: 0.48 K/UL — LOW (ref 1–3.3)
LYMPHOCYTES # BLD AUTO: 0.48 K/UL — LOW (ref 1–3.3)
LYMPHOCYTES # BLD AUTO: 0.49 K/UL — LOW (ref 1–3.3)
LYMPHOCYTES # BLD AUTO: 0.51 K/UL — LOW (ref 1–3.3)
LYMPHOCYTES # BLD AUTO: 0.53 K/UL — LOW (ref 1–3.3)
LYMPHOCYTES # BLD AUTO: 0.55 K/UL — LOW (ref 1–3.3)
LYMPHOCYTES # BLD AUTO: 0.55 K/UL — LOW (ref 1–3.3)
LYMPHOCYTES # BLD AUTO: 0.56 K/UL — LOW (ref 1–3.3)
LYMPHOCYTES # BLD AUTO: 0.57 K/UL — LOW (ref 1–3.3)
LYMPHOCYTES # BLD AUTO: 0.58 K/UL — LOW (ref 1–3.3)
LYMPHOCYTES # BLD AUTO: 23.2 % — SIGNIFICANT CHANGE UP (ref 13–44)
LYMPHOCYTES # BLD AUTO: 24 % — SIGNIFICANT CHANGE UP (ref 13–44)
LYMPHOCYTES # BLD AUTO: 24.6 % — SIGNIFICANT CHANGE UP (ref 13–44)
LYMPHOCYTES # BLD AUTO: 32 % — SIGNIFICANT CHANGE UP (ref 13–44)
LYMPHOCYTES # BLD AUTO: 33 % — SIGNIFICANT CHANGE UP (ref 13–44)
LYMPHOCYTES # BLD AUTO: 34.2 % — SIGNIFICANT CHANGE UP (ref 13–44)
LYMPHOCYTES # BLD AUTO: 35 % — SIGNIFICANT CHANGE UP (ref 13–44)
LYMPHOCYTES # BLD AUTO: 35 % — SIGNIFICANT CHANGE UP (ref 13–44)
LYMPHOCYTES # BLD AUTO: 37 % — SIGNIFICANT CHANGE UP (ref 13–44)
LYMPHOCYTES # BLD AUTO: 37 % — SIGNIFICANT CHANGE UP (ref 13–44)
LYMPHOCYTES # BLD AUTO: 40 % — SIGNIFICANT CHANGE UP (ref 13–44)
LYMPHOCYTES # BLD AUTO: 40 % — SIGNIFICANT CHANGE UP (ref 13–44)
LYMPHOCYTES # BLD AUTO: 40.6 % — SIGNIFICANT CHANGE UP (ref 13–44)
LYMPHOCYTES # BLD AUTO: 41 % — SIGNIFICANT CHANGE UP (ref 13–44)
LYMPHOCYTES # BLD AUTO: 42 % — SIGNIFICANT CHANGE UP (ref 13–44)
LYMPHOCYTES # BLD AUTO: 43 % — SIGNIFICANT CHANGE UP (ref 13–44)
LYMPHOCYTES # BLD AUTO: 43 % — SIGNIFICANT CHANGE UP (ref 13–44)
LYMPHOCYTES # BLD AUTO: 44 % — SIGNIFICANT CHANGE UP (ref 13–44)
LYMPHOCYTES # BLD AUTO: 45 % — HIGH (ref 13–44)
LYMPHOCYTES # BLD AUTO: 47 % — HIGH (ref 13–44)
LYMPHOCYTES # BLD AUTO: 52 % — HIGH (ref 13–44)
LYMPHOCYTES # BLD AUTO: 54 % — HIGH (ref 13–44)
LYMPHOCYTES # BLD AUTO: 55 % — HIGH (ref 13–44)
LYMPHOCYTES # BLD AUTO: 55.8 % — HIGH (ref 13–44)
LYMPHOCYTES # BLD AUTO: 58 % — HIGH (ref 13–44)
LYMPHOCYTES # BLD AUTO: 60 % — HIGH (ref 13–44)
LYMPHOCYTES # BLD AUTO: 62 % — HIGH (ref 13–44)
LYMPHOCYTES # BLD AUTO: 62 % — HIGH (ref 13–44)
LYMPHOCYTES # BLD AUTO: 64 % — HIGH (ref 13–44)
LYMPHOCYTES # BLD AUTO: 65 % — HIGH (ref 13–44)
LYMPHOCYTES # BLD AUTO: 66 % — HIGH (ref 13–44)
LYMPHOCYTES # BLD AUTO: 68 % — HIGH (ref 13–44)
LYMPHOCYTES # BLD AUTO: 70 % — HIGH (ref 13–44)
LYMPHOCYTES # BLD AUTO: 74 % — HIGH (ref 13–44)
LYMPHOCYTES # BLD AUTO: 77 % — HIGH (ref 13–44)
LYMPHOCYTES # BLD AUTO: 78 % — HIGH (ref 13–44)
MANUAL SMEAR VERIFICATION: SIGNIFICANT CHANGE UP
MCHC RBC-ENTMCNC: 26.8 PG — LOW (ref 27–34)
MCHC RBC-ENTMCNC: 26.9 PG — LOW (ref 27–34)
MCHC RBC-ENTMCNC: 27.2 PG — SIGNIFICANT CHANGE UP (ref 27–34)
MCHC RBC-ENTMCNC: 27.2 PG — SIGNIFICANT CHANGE UP (ref 27–34)
MCHC RBC-ENTMCNC: 27.3 PG — SIGNIFICANT CHANGE UP (ref 27–34)
MCHC RBC-ENTMCNC: 27.3 PG — SIGNIFICANT CHANGE UP (ref 27–34)
MCHC RBC-ENTMCNC: 27.4 PG — SIGNIFICANT CHANGE UP (ref 27–34)
MCHC RBC-ENTMCNC: 27.5 PG — SIGNIFICANT CHANGE UP (ref 27–34)
MCHC RBC-ENTMCNC: 27.6 PG — SIGNIFICANT CHANGE UP (ref 27–34)
MCHC RBC-ENTMCNC: 27.7 PG — SIGNIFICANT CHANGE UP (ref 27–34)
MCHC RBC-ENTMCNC: 27.8 PG — SIGNIFICANT CHANGE UP (ref 27–34)
MCHC RBC-ENTMCNC: 27.9 PG — SIGNIFICANT CHANGE UP (ref 27–34)
MCHC RBC-ENTMCNC: 28 PG — SIGNIFICANT CHANGE UP (ref 27–34)
MCHC RBC-ENTMCNC: 28 PG — SIGNIFICANT CHANGE UP (ref 27–34)
MCHC RBC-ENTMCNC: 28.1 PG — SIGNIFICANT CHANGE UP (ref 27–34)
MCHC RBC-ENTMCNC: 28.2 PG — SIGNIFICANT CHANGE UP (ref 27–34)
MCHC RBC-ENTMCNC: 28.3 PG — SIGNIFICANT CHANGE UP (ref 27–34)
MCHC RBC-ENTMCNC: 28.5 PG — SIGNIFICANT CHANGE UP (ref 27–34)
MCHC RBC-ENTMCNC: 28.6 PG — SIGNIFICANT CHANGE UP (ref 27–34)
MCHC RBC-ENTMCNC: 28.8 PG — SIGNIFICANT CHANGE UP (ref 27–34)
MCHC RBC-ENTMCNC: 28.9 PG — SIGNIFICANT CHANGE UP (ref 27–34)
MCHC RBC-ENTMCNC: 28.9 PG — SIGNIFICANT CHANGE UP (ref 27–34)
MCHC RBC-ENTMCNC: 29 PG — SIGNIFICANT CHANGE UP (ref 27–34)
MCHC RBC-ENTMCNC: 33.3 G/DL — SIGNIFICANT CHANGE UP (ref 32–36)
MCHC RBC-ENTMCNC: 33.5 G/DL — SIGNIFICANT CHANGE UP (ref 32–36)
MCHC RBC-ENTMCNC: 33.5 G/DL — SIGNIFICANT CHANGE UP (ref 32–36)
MCHC RBC-ENTMCNC: 33.7 GM/DL — SIGNIFICANT CHANGE UP (ref 32–36)
MCHC RBC-ENTMCNC: 33.9 GM/DL — SIGNIFICANT CHANGE UP (ref 32–36)
MCHC RBC-ENTMCNC: 34 G/DL — SIGNIFICANT CHANGE UP (ref 32–36)
MCHC RBC-ENTMCNC: 34 G/DL — SIGNIFICANT CHANGE UP (ref 32–36)
MCHC RBC-ENTMCNC: 34.1 G/DL — SIGNIFICANT CHANGE UP (ref 32–36)
MCHC RBC-ENTMCNC: 34.1 G/DL — SIGNIFICANT CHANGE UP (ref 32–36)
MCHC RBC-ENTMCNC: 34.3 G/DL — SIGNIFICANT CHANGE UP (ref 32–36)
MCHC RBC-ENTMCNC: 34.4 G/DL — SIGNIFICANT CHANGE UP (ref 32–36)
MCHC RBC-ENTMCNC: 34.4 G/DL — SIGNIFICANT CHANGE UP (ref 32–36)
MCHC RBC-ENTMCNC: 34.5 G/DL — SIGNIFICANT CHANGE UP (ref 32–36)
MCHC RBC-ENTMCNC: 34.5 G/DL — SIGNIFICANT CHANGE UP (ref 32–36)
MCHC RBC-ENTMCNC: 34.5 GM/DL — SIGNIFICANT CHANGE UP (ref 32–36)
MCHC RBC-ENTMCNC: 34.6 G/DL — SIGNIFICANT CHANGE UP (ref 32–36)
MCHC RBC-ENTMCNC: 34.7 G/DL — SIGNIFICANT CHANGE UP (ref 32–36)
MCHC RBC-ENTMCNC: 34.8 G/DL — SIGNIFICANT CHANGE UP (ref 32–36)
MCHC RBC-ENTMCNC: 34.8 G/DL — SIGNIFICANT CHANGE UP (ref 32–36)
MCHC RBC-ENTMCNC: 34.9 G/DL — SIGNIFICANT CHANGE UP (ref 32–36)
MCHC RBC-ENTMCNC: 34.9 G/DL — SIGNIFICANT CHANGE UP (ref 32–36)
MCHC RBC-ENTMCNC: 35 G/DL — SIGNIFICANT CHANGE UP (ref 32–36)
MCHC RBC-ENTMCNC: 35.1 G/DL — SIGNIFICANT CHANGE UP (ref 32–36)
MCHC RBC-ENTMCNC: 35.1 G/DL — SIGNIFICANT CHANGE UP (ref 32–36)
MCHC RBC-ENTMCNC: 35.2 G/DL — SIGNIFICANT CHANGE UP (ref 32–36)
MCHC RBC-ENTMCNC: 35.2 G/DL — SIGNIFICANT CHANGE UP (ref 32–36)
MCHC RBC-ENTMCNC: 35.3 G/DL — SIGNIFICANT CHANGE UP (ref 32–36)
MCHC RBC-ENTMCNC: 35.3 G/DL — SIGNIFICANT CHANGE UP (ref 32–36)
MCHC RBC-ENTMCNC: 35.4 G/DL — SIGNIFICANT CHANGE UP (ref 32–36)
MCHC RBC-ENTMCNC: 35.5 G/DL — SIGNIFICANT CHANGE UP (ref 32–36)
MCHC RBC-ENTMCNC: 35.7 G/DL — SIGNIFICANT CHANGE UP (ref 32–36)
MCHC RBC-ENTMCNC: 35.8 G/DL — SIGNIFICANT CHANGE UP (ref 32–36)
MCHC RBC-ENTMCNC: 36.5 G/DL — HIGH (ref 32–36)
MCV RBC AUTO: 76.8 FL — LOW (ref 80–100)
MCV RBC AUTO: 77.6 FL — LOW (ref 80–100)
MCV RBC AUTO: 78.3 FL — LOW (ref 80–100)
MCV RBC AUTO: 78.5 FL — LOW (ref 80–100)
MCV RBC AUTO: 78.5 FL — LOW (ref 80–100)
MCV RBC AUTO: 78.6 FL — LOW (ref 80–100)
MCV RBC AUTO: 78.7 FL — LOW (ref 80–100)
MCV RBC AUTO: 78.9 FL — LOW (ref 80–100)
MCV RBC AUTO: 78.9 FL — LOW (ref 80–100)
MCV RBC AUTO: 79 FL — LOW (ref 80–100)
MCV RBC AUTO: 79 FL — LOW (ref 80–100)
MCV RBC AUTO: 79.1 FL — LOW (ref 80–100)
MCV RBC AUTO: 79.2 FL — LOW (ref 80–100)
MCV RBC AUTO: 79.2 FL — LOW (ref 80–100)
MCV RBC AUTO: 79.3 FL — LOW (ref 80–100)
MCV RBC AUTO: 79.5 FL — LOW (ref 80–100)
MCV RBC AUTO: 79.7 FL — LOW (ref 80–100)
MCV RBC AUTO: 79.8 FL — LOW (ref 80–100)
MCV RBC AUTO: 79.9 FL — LOW (ref 80–100)
MCV RBC AUTO: 80 FL — SIGNIFICANT CHANGE UP (ref 80–100)
MCV RBC AUTO: 80.2 FL — SIGNIFICANT CHANGE UP (ref 80–100)
MCV RBC AUTO: 80.2 FL — SIGNIFICANT CHANGE UP (ref 80–100)
MCV RBC AUTO: 80.3 FL — SIGNIFICANT CHANGE UP (ref 80–100)
MCV RBC AUTO: 80.5 FL — SIGNIFICANT CHANGE UP (ref 80–100)
MCV RBC AUTO: 80.6 FL — SIGNIFICANT CHANGE UP (ref 80–100)
MCV RBC AUTO: 80.7 FL — SIGNIFICANT CHANGE UP (ref 80–100)
MCV RBC AUTO: 80.7 FL — SIGNIFICANT CHANGE UP (ref 80–100)
MCV RBC AUTO: 80.8 FL — SIGNIFICANT CHANGE UP (ref 80–100)
MCV RBC AUTO: 80.9 FL — SIGNIFICANT CHANGE UP (ref 80–100)
MCV RBC AUTO: 81.1 FL — SIGNIFICANT CHANGE UP (ref 80–100)
MCV RBC AUTO: 81.2 FL — SIGNIFICANT CHANGE UP (ref 80–100)
MCV RBC AUTO: 81.4 FL — SIGNIFICANT CHANGE UP (ref 80–100)
MCV RBC AUTO: 81.5 FL — SIGNIFICANT CHANGE UP (ref 80–100)
MCV RBC AUTO: 81.7 FL — SIGNIFICANT CHANGE UP (ref 80–100)
MCV RBC AUTO: 82.1 FL — SIGNIFICANT CHANGE UP (ref 80–100)
MCV RBC AUTO: 82.3 FL — SIGNIFICANT CHANGE UP (ref 80–100)
MCV RBC AUTO: 82.5 FL — SIGNIFICANT CHANGE UP (ref 80–100)
MCV RBC AUTO: 82.6 FL — SIGNIFICANT CHANGE UP (ref 80–100)
MCV RBC AUTO: 82.8 FL — SIGNIFICANT CHANGE UP (ref 80–100)
MCV RBC AUTO: 82.9 FL — SIGNIFICANT CHANGE UP (ref 80–100)
MCV RBC AUTO: 83.3 FL — SIGNIFICANT CHANGE UP (ref 80–100)
MCV RBC AUTO: 83.5 FL — SIGNIFICANT CHANGE UP (ref 80–100)
MCV RBC AUTO: 83.5 FL — SIGNIFICANT CHANGE UP (ref 80–100)
MCV RBC AUTO: 85 FL — SIGNIFICANT CHANGE UP (ref 80–100)
METAMYELOCYTES # FLD: 1 % — HIGH (ref 0–0)
METAMYELOCYTES # FLD: 3 % — HIGH (ref 0–0)
MICROCYTES BLD QL: SLIGHT — SIGNIFICANT CHANGE UP
MONOCYTES # BLD AUTO: 0.01 K/UL — SIGNIFICANT CHANGE UP (ref 0–0.9)
MONOCYTES # BLD AUTO: 0.02 K/UL — SIGNIFICANT CHANGE UP (ref 0–0.9)
MONOCYTES # BLD AUTO: 0.03 K/UL — SIGNIFICANT CHANGE UP (ref 0–0.9)
MONOCYTES # BLD AUTO: 0.04 K/UL — SIGNIFICANT CHANGE UP (ref 0–0.9)
MONOCYTES # BLD AUTO: 0.05 K/UL — SIGNIFICANT CHANGE UP (ref 0–0.9)
MONOCYTES # BLD AUTO: 0.06 K/UL — SIGNIFICANT CHANGE UP (ref 0–0.9)
MONOCYTES # BLD AUTO: 0.06 K/UL — SIGNIFICANT CHANGE UP (ref 0–0.9)
MONOCYTES # BLD AUTO: 0.07 K/UL — SIGNIFICANT CHANGE UP (ref 0–0.9)
MONOCYTES # BLD AUTO: 0.07 K/UL — SIGNIFICANT CHANGE UP (ref 0–0.9)
MONOCYTES # BLD AUTO: 0.08 K/UL — SIGNIFICANT CHANGE UP (ref 0–0.9)
MONOCYTES # BLD AUTO: 0.09 K/UL — SIGNIFICANT CHANGE UP (ref 0–0.9)
MONOCYTES # BLD AUTO: 0.09 K/UL — SIGNIFICANT CHANGE UP (ref 0–0.9)
MONOCYTES # BLD AUTO: 0.11 K/UL — SIGNIFICANT CHANGE UP (ref 0–0.9)
MONOCYTES # BLD AUTO: 0.12 K/UL — SIGNIFICANT CHANGE UP (ref 0–0.9)
MONOCYTES # BLD AUTO: 0.12 K/UL — SIGNIFICANT CHANGE UP (ref 0–0.9)
MONOCYTES # BLD AUTO: 0.13 K/UL — SIGNIFICANT CHANGE UP (ref 0–0.9)
MONOCYTES # BLD AUTO: 0.15 K/UL — SIGNIFICANT CHANGE UP (ref 0–0.9)
MONOCYTES # BLD AUTO: 0.16 K/UL — SIGNIFICANT CHANGE UP (ref 0–0.9)
MONOCYTES # BLD AUTO: 0.16 K/UL — SIGNIFICANT CHANGE UP (ref 0–0.9)
MONOCYTES # BLD AUTO: 0.19 K/UL — SIGNIFICANT CHANGE UP (ref 0–0.9)
MONOCYTES NFR BLD AUTO: 10.7 % — SIGNIFICANT CHANGE UP (ref 2–14)
MONOCYTES NFR BLD AUTO: 11 % — SIGNIFICANT CHANGE UP (ref 2–14)
MONOCYTES NFR BLD AUTO: 12 % — SIGNIFICANT CHANGE UP (ref 2–14)
MONOCYTES NFR BLD AUTO: 12 % — SIGNIFICANT CHANGE UP (ref 2–14)
MONOCYTES NFR BLD AUTO: 13 % — SIGNIFICANT CHANGE UP (ref 2–14)
MONOCYTES NFR BLD AUTO: 13 % — SIGNIFICANT CHANGE UP (ref 2–14)
MONOCYTES NFR BLD AUTO: 15.1 % — HIGH (ref 2–14)
MONOCYTES NFR BLD AUTO: 16 % — HIGH (ref 2–14)
MONOCYTES NFR BLD AUTO: 17.3 % — HIGH (ref 2–14)
MONOCYTES NFR BLD AUTO: 2 % — SIGNIFICANT CHANGE UP (ref 2–14)
MONOCYTES NFR BLD AUTO: 20 % — HIGH (ref 2–14)
MONOCYTES NFR BLD AUTO: 3 % — SIGNIFICANT CHANGE UP (ref 2–14)
MONOCYTES NFR BLD AUTO: 3 % — SIGNIFICANT CHANGE UP (ref 2–14)
MONOCYTES NFR BLD AUTO: 4 % — SIGNIFICANT CHANGE UP (ref 2–14)
MONOCYTES NFR BLD AUTO: 5 % — SIGNIFICANT CHANGE UP (ref 2–14)
MONOCYTES NFR BLD AUTO: 6 % — SIGNIFICANT CHANGE UP (ref 2–14)
MONOCYTES NFR BLD AUTO: 7 % — SIGNIFICANT CHANGE UP (ref 2–14)
MONOCYTES NFR BLD AUTO: 7 % — SIGNIFICANT CHANGE UP (ref 2–14)
MONOCYTES NFR BLD AUTO: 8 % — SIGNIFICANT CHANGE UP (ref 2–14)
MONOCYTES NFR BLD AUTO: 8.5 % — SIGNIFICANT CHANGE UP (ref 2–14)
MONOCYTES NFR BLD AUTO: 9 % — SIGNIFICANT CHANGE UP (ref 2–14)
MYELOCYTES NFR BLD: 1 % — HIGH (ref 0–0)
MYELOCYTES NFR BLD: 1 % — HIGH (ref 0–0)
MYELOCYTES NFR BLD: 2 % — HIGH (ref 0–0)
MYELOCYTES NFR BLD: 2 % — HIGH (ref 0–0)
NEUTROPHILS # BLD AUTO: 0.08 K/UL — LOW (ref 1.8–7.4)
NEUTROPHILS # BLD AUTO: 0.1 K/UL — LOW (ref 1.8–7.4)
NEUTROPHILS # BLD AUTO: 0.11 K/UL — LOW (ref 1.8–7.4)
NEUTROPHILS # BLD AUTO: 0.11 K/UL — LOW (ref 1.8–7.4)
NEUTROPHILS # BLD AUTO: 0.14 K/UL — LOW (ref 1.8–7.4)
NEUTROPHILS # BLD AUTO: 0.15 K/UL — LOW (ref 1.8–7.4)
NEUTROPHILS # BLD AUTO: 0.16 K/UL — LOW (ref 1.8–7.4)
NEUTROPHILS # BLD AUTO: 0.16 K/UL — LOW (ref 1.8–7.4)
NEUTROPHILS # BLD AUTO: 0.17 K/UL — LOW (ref 1.8–7.4)
NEUTROPHILS # BLD AUTO: 0.18 K/UL — LOW (ref 1.8–7.4)
NEUTROPHILS # BLD AUTO: 0.22 K/UL — LOW (ref 1.8–7.4)
NEUTROPHILS # BLD AUTO: 0.24 K/UL — LOW (ref 1.8–7.4)
NEUTROPHILS # BLD AUTO: 0.25 K/UL — LOW (ref 1.8–7.4)
NEUTROPHILS # BLD AUTO: 0.25 K/UL — LOW (ref 1.8–7.4)
NEUTROPHILS # BLD AUTO: 0.26 K/UL — LOW (ref 1.8–7.4)
NEUTROPHILS # BLD AUTO: 0.26 K/UL — LOW (ref 1.8–7.4)
NEUTROPHILS # BLD AUTO: 0.28 K/UL — LOW (ref 1.8–7.4)
NEUTROPHILS # BLD AUTO: 0.28 K/UL — LOW (ref 1.8–7.4)
NEUTROPHILS # BLD AUTO: 0.29 K/UL — LOW (ref 1.8–7.4)
NEUTROPHILS # BLD AUTO: 0.34 K/UL — LOW (ref 1.8–7.4)
NEUTROPHILS # BLD AUTO: 0.35 K/UL — LOW (ref 1.8–7.4)
NEUTROPHILS # BLD AUTO: 0.36 K/UL — LOW (ref 1.8–7.4)
NEUTROPHILS # BLD AUTO: 0.36 K/UL — LOW (ref 1.8–7.4)
NEUTROPHILS # BLD AUTO: 0.39 K/UL — LOW (ref 1.8–7.4)
NEUTROPHILS # BLD AUTO: 0.4 K/UL — LOW (ref 1.8–7.4)
NEUTROPHILS # BLD AUTO: 0.41 K/UL — LOW (ref 1.8–7.4)
NEUTROPHILS # BLD AUTO: 0.41 K/UL — LOW (ref 1.8–7.4)
NEUTROPHILS # BLD AUTO: 0.43 K/UL — LOW (ref 1.8–7.4)
NEUTROPHILS # BLD AUTO: 0.44 K/UL — LOW (ref 1.8–7.4)
NEUTROPHILS # BLD AUTO: 0.52 K/UL — LOW (ref 1.8–7.4)
NEUTROPHILS # BLD AUTO: 0.52 K/UL — LOW (ref 1.8–7.4)
NEUTROPHILS # BLD AUTO: 0.59 K/UL — LOW (ref 1.8–7.4)
NEUTROPHILS # BLD AUTO: 0.66 K/UL — LOW (ref 1.8–7.4)
NEUTROPHILS # BLD AUTO: 0.7 K/UL — LOW (ref 1.8–7.4)
NEUTROPHILS # BLD AUTO: 0.75 K/UL — LOW (ref 1.8–7.4)
NEUTROPHILS # BLD AUTO: 0.76 K/UL — LOW (ref 1.8–7.4)
NEUTROPHILS # BLD AUTO: 0.81 K/UL — LOW (ref 1.8–7.4)
NEUTROPHILS # BLD AUTO: 0.86 K/UL — LOW (ref 1.8–7.4)
NEUTROPHILS # BLD AUTO: 0.92 K/UL — LOW (ref 1.8–7.4)
NEUTROPHILS # BLD AUTO: 0.95 K/UL — LOW (ref 1.8–7.4)
NEUTROPHILS NFR BLD AUTO: 12 % — LOW (ref 43–77)
NEUTROPHILS NFR BLD AUTO: 14 % — LOW (ref 43–77)
NEUTROPHILS NFR BLD AUTO: 15 % — LOW (ref 43–77)
NEUTROPHILS NFR BLD AUTO: 20 % — LOW (ref 43–77)
NEUTROPHILS NFR BLD AUTO: 21.1 % — LOW (ref 43–77)
NEUTROPHILS NFR BLD AUTO: 23 % — LOW (ref 43–77)
NEUTROPHILS NFR BLD AUTO: 26 % — LOW (ref 43–77)
NEUTROPHILS NFR BLD AUTO: 26 % — LOW (ref 43–77)
NEUTROPHILS NFR BLD AUTO: 28 % — LOW (ref 43–77)
NEUTROPHILS NFR BLD AUTO: 28 % — LOW (ref 43–77)
NEUTROPHILS NFR BLD AUTO: 29 % — LOW (ref 43–77)
NEUTROPHILS NFR BLD AUTO: 33 % — LOW (ref 43–77)
NEUTROPHILS NFR BLD AUTO: 36 % — LOW (ref 43–77)
NEUTROPHILS NFR BLD AUTO: 38 % — LOW (ref 43–77)
NEUTROPHILS NFR BLD AUTO: 39 % — LOW (ref 43–77)
NEUTROPHILS NFR BLD AUTO: 42 % — LOW (ref 43–77)
NEUTROPHILS NFR BLD AUTO: 44 % — SIGNIFICANT CHANGE UP (ref 43–77)
NEUTROPHILS NFR BLD AUTO: 46 % — SIGNIFICANT CHANGE UP (ref 43–77)
NEUTROPHILS NFR BLD AUTO: 46.6 % — SIGNIFICANT CHANGE UP (ref 43–77)
NEUTROPHILS NFR BLD AUTO: 48 % — SIGNIFICANT CHANGE UP (ref 43–77)
NEUTROPHILS NFR BLD AUTO: 49 % — SIGNIFICANT CHANGE UP (ref 43–77)
NEUTROPHILS NFR BLD AUTO: 50 % — SIGNIFICANT CHANGE UP (ref 43–77)
NEUTROPHILS NFR BLD AUTO: 51.4 % — SIGNIFICANT CHANGE UP (ref 43–77)
NEUTROPHILS NFR BLD AUTO: 54 % — SIGNIFICANT CHANGE UP (ref 43–77)
NEUTROPHILS NFR BLD AUTO: 54 % — SIGNIFICANT CHANGE UP (ref 43–77)
NEUTROPHILS NFR BLD AUTO: 56 % — SIGNIFICANT CHANGE UP (ref 43–77)
NEUTROPHILS NFR BLD AUTO: 57 % — SIGNIFICANT CHANGE UP (ref 43–77)
NEUTROPHILS NFR BLD AUTO: 59 % — SIGNIFICANT CHANGE UP (ref 43–77)
NEUTROPHILS NFR BLD AUTO: 61.4 % — SIGNIFICANT CHANGE UP (ref 43–77)
NEUTROPHILS NFR BLD AUTO: 62 % — SIGNIFICANT CHANGE UP (ref 43–77)
NEUTROPHILS NFR BLD AUTO: 63 % — SIGNIFICANT CHANGE UP (ref 43–77)
NEUTROPHILS NFR BLD AUTO: 66.9 % — SIGNIFICANT CHANGE UP (ref 43–77)
NEUTROPHILS NFR BLD AUTO: 67 % — SIGNIFICANT CHANGE UP (ref 43–77)
NITRITE UR-MCNC: NEGATIVE — SIGNIFICANT CHANGE UP
NRBC # BLD: 0 /100 WBCS — SIGNIFICANT CHANGE UP (ref 0–0)
NRBC # BLD: 0 /100 — SIGNIFICANT CHANGE UP (ref 0–0)
NRBC # BLD: 1 /100 — HIGH (ref 0–0)
NRBC # BLD: 2 /100 — HIGH (ref 0–0)
NRBC # BLD: 3 /100 — HIGH (ref 0–0)
NRBC # BLD: 4 /100 WBCS — HIGH (ref 0–0)
NRBC # BLD: 4 /100 WBCS — HIGH (ref 0–0)
NRBC # BLD: 5 /100 WBCS — HIGH (ref 0–0)
NRBC # BLD: 5 /100 WBCS — HIGH (ref 0–0)
NRBC # BLD: 5 /100 — HIGH (ref 0–0)
NRBC # BLD: 5 /100 — HIGH (ref 0–0)
NRBC # BLD: 6 /100 WBCS — HIGH (ref 0–0)
NRBC # BLD: 6 /100 — HIGH (ref 0–0)
NRBC # BLD: 7 /100 WBCS — HIGH (ref 0–0)
NRBC # BLD: SIGNIFICANT CHANGE UP /100 WBCS (ref 0–0)
PCO2 BLDV: 34 MMHG — LOW (ref 42–55)
PCO2 BLDV: 49 MMHG — SIGNIFICANT CHANGE UP (ref 42–55)
PH BLDV: 7.3 — LOW (ref 7.32–7.43)
PH BLDV: 7.46 — HIGH (ref 7.32–7.43)
PH UR: 6 — SIGNIFICANT CHANGE UP (ref 5–8)
PLAT MORPH BLD: NORMAL — SIGNIFICANT CHANGE UP
PLATELET # BLD AUTO: 115 K/UL — LOW (ref 150–400)
PLATELET # BLD AUTO: 14 K/UL — CRITICAL LOW (ref 150–400)
PLATELET # BLD AUTO: 14 K/UL — CRITICAL LOW (ref 150–400)
PLATELET # BLD AUTO: 146 K/UL — LOW (ref 150–400)
PLATELET # BLD AUTO: 156 K/UL — SIGNIFICANT CHANGE UP (ref 150–400)
PLATELET # BLD AUTO: 16 K/UL — CRITICAL LOW (ref 150–400)
PLATELET # BLD AUTO: 18 K/UL — CRITICAL LOW (ref 150–400)
PLATELET # BLD AUTO: 19 K/UL — CRITICAL LOW (ref 150–400)
PLATELET # BLD AUTO: 20 K/UL — CRITICAL LOW (ref 150–400)
PLATELET # BLD AUTO: 21 K/UL — LOW (ref 150–400)
PLATELET # BLD AUTO: 22 K/UL — LOW (ref 150–400)
PLATELET # BLD AUTO: 23 K/UL — LOW (ref 150–400)
PLATELET # BLD AUTO: 26 K/UL — LOW (ref 150–400)
PLATELET # BLD AUTO: 27 K/UL — LOW (ref 150–400)
PLATELET # BLD AUTO: 28 K/UL — LOW (ref 150–400)
PLATELET # BLD AUTO: 30 K/UL — LOW (ref 150–400)
PLATELET # BLD AUTO: 31 K/UL — LOW (ref 150–400)
PLATELET # BLD AUTO: 32 K/UL — LOW (ref 150–400)
PLATELET # BLD AUTO: 34 K/UL — LOW (ref 150–400)
PLATELET # BLD AUTO: 34 K/UL — LOW (ref 150–400)
PLATELET # BLD AUTO: 35 K/UL — LOW (ref 150–400)
PLATELET # BLD AUTO: 36 K/UL — LOW (ref 150–400)
PLATELET # BLD AUTO: 39 K/UL — LOW (ref 150–400)
PLATELET # BLD AUTO: 39 K/UL — LOW (ref 150–400)
PLATELET # BLD AUTO: 40 K/UL — LOW (ref 150–400)
PLATELET # BLD AUTO: 40 K/UL — LOW (ref 150–400)
PLATELET # BLD AUTO: 41 K/UL — LOW (ref 150–400)
PLATELET # BLD AUTO: 41 K/UL — LOW (ref 150–400)
PLATELET # BLD AUTO: 46 K/UL — LOW (ref 150–400)
PLATELET # BLD AUTO: 52 K/UL — LOW (ref 150–400)
PLATELET # BLD AUTO: 52 K/UL — LOW (ref 150–400)
PLATELET # BLD AUTO: 53 K/UL — LOW (ref 150–400)
PLATELET # BLD AUTO: 56 K/UL — LOW (ref 150–400)
PLATELET # BLD AUTO: 56 K/UL — LOW (ref 150–400)
PLATELET # BLD AUTO: 60 K/UL — LOW (ref 150–400)
PLATELET # BLD AUTO: 60 K/UL — LOW (ref 150–400)
PLATELET # BLD AUTO: 61 K/UL — LOW (ref 150–400)
PLATELET # BLD AUTO: 62 K/UL — LOW (ref 150–400)
PLATELET # BLD AUTO: 62 K/UL — LOW (ref 150–400)
PLATELET # BLD AUTO: 65 K/UL — LOW (ref 150–400)
PLATELET # BLD AUTO: 67 K/UL — LOW (ref 150–400)
PLATELET # BLD AUTO: 76 K/UL — LOW (ref 150–400)
PLATELET # BLD AUTO: 84 K/UL — LOW (ref 150–400)
PLATELET # BLD AUTO: 93 K/UL — LOW (ref 150–400)
PLATELET # BLD AUTO: 96 K/UL — LOW (ref 150–400)
PLATELET # BLD AUTO: 99 K/UL — LOW (ref 150–400)
PLATELET MAPPING ACTF MAX AMPLITUDE: >30 MM (ref 2–19)
PLATELET MAPPING ADP MAXIMUM AMPLITUDE: 42.4 MM (ref 45–69)
PLATELET MAPPING ADP PERCENT INHIBITION: ABNORMAL % (ref 0–17)
PLATELET MAPPING HKH MAXIMUM AMPLITUDE: <42 MM (ref 53–68)
PO2 BLDV: 50 MMHG — HIGH (ref 25–45)
PO2 BLDV: 74 MMHG — HIGH (ref 25–45)
POIKILOCYTOSIS BLD QL AUTO: SLIGHT — SIGNIFICANT CHANGE UP
POTASSIUM BLDV-SCNC: 3.5 MMOL/L — SIGNIFICANT CHANGE UP (ref 3.5–5.1)
POTASSIUM BLDV-SCNC: 4.4 MMOL/L — SIGNIFICANT CHANGE UP (ref 3.5–5.1)
POTASSIUM SERPL-MCNC: 3.5 MMOL/L — SIGNIFICANT CHANGE UP (ref 3.5–5.3)
POTASSIUM SERPL-MCNC: 4.5 MMOL/L — SIGNIFICANT CHANGE UP (ref 3.5–5.3)
POTASSIUM SERPL-MCNC: 4.9 MMOL/L — SIGNIFICANT CHANGE UP (ref 3.5–5.3)
POTASSIUM SERPL-SCNC: 3.5 MMOL/L — SIGNIFICANT CHANGE UP (ref 3.5–5.3)
POTASSIUM SERPL-SCNC: 4.1 MMOL/L
POTASSIUM SERPL-SCNC: 4.3 MMOL/L
POTASSIUM SERPL-SCNC: 4.5 MMOL/L
POTASSIUM SERPL-SCNC: 4.5 MMOL/L — SIGNIFICANT CHANGE UP (ref 3.5–5.3)
POTASSIUM SERPL-SCNC: 4.7 MMOL/L
POTASSIUM SERPL-SCNC: 4.9 MMOL/L — SIGNIFICANT CHANGE UP (ref 3.5–5.3)
POTASSIUM SERPL-SCNC: 5 MMOL/L
PROMYELOCYTES # FLD: 1 % — HIGH (ref 0–0)
PROMYELOCYTES # FLD: 2 % — HIGH (ref 0–0)
PROT SERPL-MCNC: 6.3 G/DL — SIGNIFICANT CHANGE UP (ref 6–8.3)
PROT SERPL-MCNC: 6.6 G/DL
PROT SERPL-MCNC: 6.7 G/DL
PROT SERPL-MCNC: 6.8 G/DL — SIGNIFICANT CHANGE UP (ref 6–8.3)
PROT SERPL-MCNC: 6.9 G/DL
PROT SERPL-MCNC: 6.9 G/DL
PROT SERPL-MCNC: 7 G/DL
PROT SERPL-MCNC: 7 G/DL — SIGNIFICANT CHANGE UP (ref 6–8.3)
PROT SERPL-MCNC: 7.3 G/DL
PROT UR-MCNC: ABNORMAL
PROTHROM AB SERPL-ACNC: 15.5 SEC — HIGH (ref 10.5–13.4)
RAPID RVP RESULT: DETECTED
RBC # BLD: 1.98 M/UL — LOW (ref 4.2–5.8)
RBC # BLD: 2.19 M/UL — LOW (ref 4.2–5.8)
RBC # BLD: 2.34 M/UL — LOW (ref 4.2–5.8)
RBC # BLD: 2.36 M/UL — LOW (ref 4.2–5.8)
RBC # BLD: 2.36 M/UL — LOW (ref 4.2–5.8)
RBC # BLD: 2.46 M/UL — LOW (ref 4.2–5.8)
RBC # BLD: 2.48 M/UL — LOW (ref 4.2–5.8)
RBC # BLD: 2.52 M/UL — LOW (ref 4.2–5.8)
RBC # BLD: 2.53 M/UL — LOW (ref 4.2–5.8)
RBC # BLD: 2.54 M/UL — LOW (ref 4.2–5.8)
RBC # BLD: 2.56 M/UL — LOW (ref 4.2–5.8)
RBC # BLD: 2.56 M/UL — LOW (ref 4.2–5.8)
RBC # BLD: 2.62 M/UL — LOW (ref 4.2–5.8)
RBC # BLD: 2.63 M/UL — LOW (ref 4.2–5.8)
RBC # BLD: 2.63 M/UL — LOW (ref 4.2–5.8)
RBC # BLD: 2.64 M/UL — LOW (ref 4.2–5.8)
RBC # BLD: 2.65 M/UL — LOW (ref 4.2–5.8)
RBC # BLD: 2.66 M/UL — LOW (ref 4.2–5.8)
RBC # BLD: 2.66 M/UL — LOW (ref 4.2–5.8)
RBC # BLD: 2.68 M/UL — LOW (ref 4.2–5.8)
RBC # BLD: 2.69 M/UL — LOW (ref 4.2–5.8)
RBC # BLD: 2.69 M/UL — LOW (ref 4.2–5.8)
RBC # BLD: 2.72 M/UL — LOW (ref 4.2–5.8)
RBC # BLD: 2.73 M/UL — LOW (ref 4.2–5.8)
RBC # BLD: 2.73 M/UL — LOW (ref 4.2–5.8)
RBC # BLD: 2.74 M/UL — LOW (ref 4.2–5.8)
RBC # BLD: 2.74 M/UL — LOW (ref 4.2–5.8)
RBC # BLD: 2.79 M/UL — LOW (ref 4.2–5.8)
RBC # BLD: 2.81 M/UL — LOW (ref 4.2–5.8)
RBC # BLD: 2.86 M/UL — LOW (ref 4.2–5.8)
RBC # BLD: 2.88 M/UL — LOW (ref 4.2–5.8)
RBC # BLD: 2.89 M/UL — LOW (ref 4.2–5.8)
RBC # BLD: 2.93 M/UL — LOW (ref 4.2–5.8)
RBC # BLD: 2.94 M/UL — LOW (ref 4.2–5.8)
RBC # BLD: 2.95 M/UL — LOW (ref 4.2–5.8)
RBC # BLD: 2.97 M/UL — LOW (ref 4.2–5.8)
RBC # BLD: 3.01 M/UL — LOW (ref 4.2–5.8)
RBC # BLD: 3.02 M/UL — LOW (ref 4.2–5.8)
RBC # BLD: 3.04 M/UL — LOW (ref 4.2–5.8)
RBC # BLD: 3.04 M/UL — LOW (ref 4.2–5.8)
RBC # BLD: 3.05 M/UL — LOW (ref 4.2–5.8)
RBC # BLD: 3.05 M/UL — LOW (ref 4.2–5.8)
RBC # BLD: 3.06 M/UL — LOW (ref 4.2–5.8)
RBC # BLD: 3.08 M/UL — LOW (ref 4.2–5.8)
RBC # BLD: 3.08 M/UL — LOW (ref 4.2–5.8)
RBC # BLD: 3.1 M/UL — LOW (ref 4.2–5.8)
RBC # BLD: 3.13 M/UL — LOW (ref 4.2–5.8)
RBC # BLD: 3.44 M/UL — LOW (ref 4.2–5.8)
RBC # FLD: 11.9 % — SIGNIFICANT CHANGE UP (ref 10.3–14.5)
RBC # FLD: 12 % — SIGNIFICANT CHANGE UP (ref 10.3–14.5)
RBC # FLD: 12 % — SIGNIFICANT CHANGE UP (ref 10.3–14.5)
RBC # FLD: 12.1 % — SIGNIFICANT CHANGE UP (ref 10.3–14.5)
RBC # FLD: 12.3 % — SIGNIFICANT CHANGE UP (ref 10.3–14.5)
RBC # FLD: 12.4 % — SIGNIFICANT CHANGE UP (ref 10.3–14.5)
RBC # FLD: 12.4 % — SIGNIFICANT CHANGE UP (ref 10.3–14.5)
RBC # FLD: 12.5 % — SIGNIFICANT CHANGE UP (ref 10.3–14.5)
RBC # FLD: 12.6 % — SIGNIFICANT CHANGE UP (ref 10.3–14.5)
RBC # FLD: 12.7 % — SIGNIFICANT CHANGE UP (ref 10.3–14.5)
RBC # FLD: 12.7 % — SIGNIFICANT CHANGE UP (ref 10.3–14.5)
RBC # FLD: 12.8 % — SIGNIFICANT CHANGE UP (ref 10.3–14.5)
RBC # FLD: 12.9 % — SIGNIFICANT CHANGE UP (ref 10.3–14.5)
RBC # FLD: 12.9 % — SIGNIFICANT CHANGE UP (ref 10.3–14.5)
RBC # FLD: 13 % — SIGNIFICANT CHANGE UP (ref 10.3–14.5)
RBC # FLD: 13.2 % — SIGNIFICANT CHANGE UP (ref 10.3–14.5)
RBC # FLD: 13.3 % — SIGNIFICANT CHANGE UP (ref 10.3–14.5)
RBC # FLD: 13.4 % — SIGNIFICANT CHANGE UP (ref 10.3–14.5)
RBC # FLD: 13.5 % — SIGNIFICANT CHANGE UP (ref 10.3–14.5)
RBC # FLD: 13.6 % — SIGNIFICANT CHANGE UP (ref 10.3–14.5)
RBC # FLD: 13.7 % — SIGNIFICANT CHANGE UP (ref 10.3–14.5)
RBC # FLD: 13.8 % — SIGNIFICANT CHANGE UP (ref 10.3–14.5)
RBC # FLD: 14 % — SIGNIFICANT CHANGE UP (ref 10.3–14.5)
RBC # FLD: 14.1 % — SIGNIFICANT CHANGE UP (ref 10.3–14.5)
RBC # FLD: 14.3 % — SIGNIFICANT CHANGE UP (ref 10.3–14.5)
RBC # FLD: 14.4 % — SIGNIFICANT CHANGE UP (ref 10.3–14.5)
RBC # FLD: 15.2 % — HIGH (ref 10.3–14.5)
RBC BLD AUTO: ABNORMAL
RBC BLD AUTO: SIGNIFICANT CHANGE UP
RBC CASTS # UR COMP ASSIST: 88 /HPF — HIGH (ref 0–4)
SAO2 % BLDV: 78.9 % — SIGNIFICANT CHANGE UP (ref 67–88)
SAO2 % BLDV: 98.9 % — HIGH (ref 67–88)
SARS-COV-2 RNA PNL RESP NAA+PROBE: NOT DETECTED
SCHISTOCYTES BLD QL AUTO: SLIGHT — SIGNIFICANT CHANGE UP
SODIUM SERPL-SCNC: 136 MMOL/L
SODIUM SERPL-SCNC: 136 MMOL/L
SODIUM SERPL-SCNC: 137 MMOL/L — SIGNIFICANT CHANGE UP (ref 135–145)
SODIUM SERPL-SCNC: 137 MMOL/L — SIGNIFICANT CHANGE UP (ref 135–145)
SODIUM SERPL-SCNC: 138 MMOL/L
SODIUM SERPL-SCNC: 138 MMOL/L
SODIUM SERPL-SCNC: 139 MMOL/L
SODIUM SERPL-SCNC: 139 MMOL/L
SODIUM SERPL-SCNC: 139 MMOL/L — SIGNIFICANT CHANGE UP (ref 135–145)
SODIUM SERPL-SCNC: 140 MMOL/L
SP GR SPEC: 1.03 — HIGH (ref 1.01–1.02)
SPECIMEN SOURCE: SIGNIFICANT CHANGE UP
SPECIMEN SOURCE: SIGNIFICANT CHANGE UP
TROPONIN T, HIGH SENSITIVITY RESULT: 152 NG/L — HIGH (ref 0–51)
UROBILINOGEN FLD QL: ABNORMAL
VARIANT LYMPHS # BLD: 2 % — SIGNIFICANT CHANGE UP (ref 0–6)
WBC # BLD: 0.31 K/UL — CRITICAL LOW (ref 3.8–10.5)
WBC # BLD: 0.36 K/UL — CRITICAL LOW (ref 3.8–10.5)
WBC # BLD: 0.38 K/UL — CRITICAL LOW (ref 3.8–10.5)
WBC # BLD: 0.44 K/UL — CRITICAL LOW (ref 3.8–10.5)
WBC # BLD: 0.45 K/UL — CRITICAL LOW (ref 3.8–10.5)
WBC # BLD: 0.45 K/UL — CRITICAL LOW (ref 3.8–10.5)
WBC # BLD: 0.47 K/UL — CRITICAL LOW (ref 3.8–10.5)
WBC # BLD: 0.48 K/UL — CRITICAL LOW (ref 3.8–10.5)
WBC # BLD: 0.48 K/UL — CRITICAL LOW (ref 3.8–10.5)
WBC # BLD: 0.49 K/UL — CRITICAL LOW (ref 3.8–10.5)
WBC # BLD: 0.51 K/UL — CRITICAL LOW (ref 3.8–10.5)
WBC # BLD: 0.51 K/UL — CRITICAL LOW (ref 3.8–10.5)
WBC # BLD: 0.52 K/UL — CRITICAL LOW (ref 3.8–10.5)
WBC # BLD: 0.55 K/UL — CRITICAL LOW (ref 3.8–10.5)
WBC # BLD: 0.57 K/UL — CRITICAL LOW (ref 3.8–10.5)
WBC # BLD: 0.57 K/UL — CRITICAL LOW (ref 3.8–10.5)
WBC # BLD: 0.6 K/UL — CRITICAL LOW (ref 3.8–10.5)
WBC # BLD: 0.61 K/UL — CRITICAL LOW (ref 3.8–10.5)
WBC # BLD: 0.62 K/UL — CRITICAL LOW (ref 3.8–10.5)
WBC # BLD: 0.65 K/UL — CRITICAL LOW (ref 3.8–10.5)
WBC # BLD: 0.66 K/UL — CRITICAL LOW (ref 3.8–10.5)
WBC # BLD: 0.66 K/UL — CRITICAL LOW (ref 3.8–10.5)
WBC # BLD: 0.67 K/UL — CRITICAL LOW (ref 3.8–10.5)
WBC # BLD: 0.69 K/UL — CRITICAL LOW (ref 3.8–10.5)
WBC # BLD: 0.7 K/UL — CRITICAL LOW (ref 3.8–10.5)
WBC # BLD: 0.73 K/UL — CRITICAL LOW (ref 3.8–10.5)
WBC # BLD: 0.77 K/UL — CRITICAL LOW (ref 3.8–10.5)
WBC # BLD: 0.78 K/UL — CRITICAL LOW (ref 3.8–10.5)
WBC # BLD: 0.8 K/UL — CRITICAL LOW (ref 3.8–10.5)
WBC # BLD: 0.81 K/UL — CRITICAL LOW (ref 3.8–10.5)
WBC # BLD: 0.84 K/UL — CRITICAL LOW (ref 3.8–10.5)
WBC # BLD: 0.85 K/UL — CRITICAL LOW (ref 3.8–10.5)
WBC # BLD: 0.85 K/UL — CRITICAL LOW (ref 3.8–10.5)
WBC # BLD: 0.88 K/UL — CRITICAL LOW (ref 3.8–10.5)
WBC # BLD: 0.93 K/UL — CRITICAL LOW (ref 3.8–10.5)
WBC # BLD: 0.93 K/UL — CRITICAL LOW (ref 3.8–10.5)
WBC # BLD: 1.01 K/UL — LOW (ref 3.8–10.5)
WBC # BLD: 1.04 K/UL — LOW (ref 3.8–10.5)
WBC # BLD: 1.22 K/UL — LOW (ref 3.8–10.5)
WBC # BLD: 1.23 K/UL — LOW (ref 3.8–10.5)
WBC # BLD: 1.24 K/UL — LOW (ref 3.8–10.5)
WBC # BLD: 1.29 K/UL — LOW (ref 3.8–10.5)
WBC # BLD: 1.29 K/UL — LOW (ref 3.8–10.5)
WBC # BLD: 1.32 K/UL — LOW (ref 3.8–10.5)
WBC # BLD: 1.41 K/UL — LOW (ref 3.8–10.5)
WBC # BLD: 1.42 K/UL — LOW (ref 3.8–10.5)
WBC # BLD: 1.42 K/UL — LOW (ref 3.8–10.5)
WBC # BLD: 1.49 K/UL — LOW (ref 3.8–10.5)
WBC # FLD AUTO: 0.31 K/UL — CRITICAL LOW (ref 3.8–10.5)
WBC # FLD AUTO: 0.36 K/UL — CRITICAL LOW (ref 3.8–10.5)
WBC # FLD AUTO: 0.38 K/UL — CRITICAL LOW (ref 3.8–10.5)
WBC # FLD AUTO: 0.44 K/UL — CRITICAL LOW (ref 3.8–10.5)
WBC # FLD AUTO: 0.45 K/UL — CRITICAL LOW (ref 3.8–10.5)
WBC # FLD AUTO: 0.45 K/UL — CRITICAL LOW (ref 3.8–10.5)
WBC # FLD AUTO: 0.47 K/UL — CRITICAL LOW (ref 3.8–10.5)
WBC # FLD AUTO: 0.48 K/UL — CRITICAL LOW (ref 3.8–10.5)
WBC # FLD AUTO: 0.48 K/UL — CRITICAL LOW (ref 3.8–10.5)
WBC # FLD AUTO: 0.49 K/UL — CRITICAL LOW (ref 3.8–10.5)
WBC # FLD AUTO: 0.51 K/UL — CRITICAL LOW (ref 3.8–10.5)
WBC # FLD AUTO: 0.51 K/UL — CRITICAL LOW (ref 3.8–10.5)
WBC # FLD AUTO: 0.52 K/UL — CRITICAL LOW (ref 3.8–10.5)
WBC # FLD AUTO: 0.55 K/UL — CRITICAL LOW (ref 3.8–10.5)
WBC # FLD AUTO: 0.57 K/UL — CRITICAL LOW (ref 3.8–10.5)
WBC # FLD AUTO: 0.57 K/UL — CRITICAL LOW (ref 3.8–10.5)
WBC # FLD AUTO: 0.6 K/UL — CRITICAL LOW (ref 3.8–10.5)
WBC # FLD AUTO: 0.61 K/UL — CRITICAL LOW (ref 3.8–10.5)
WBC # FLD AUTO: 0.62 K/UL — CRITICAL LOW (ref 3.8–10.5)
WBC # FLD AUTO: 0.65 K/UL — CRITICAL LOW (ref 3.8–10.5)
WBC # FLD AUTO: 0.66 K/UL — CRITICAL LOW (ref 3.8–10.5)
WBC # FLD AUTO: 0.66 K/UL — CRITICAL LOW (ref 3.8–10.5)
WBC # FLD AUTO: 0.67 K/UL — CRITICAL LOW (ref 3.8–10.5)
WBC # FLD AUTO: 0.69 K/UL — CRITICAL LOW (ref 3.8–10.5)
WBC # FLD AUTO: 0.7 K/UL — CRITICAL LOW (ref 3.8–10.5)
WBC # FLD AUTO: 0.73 K/UL — CRITICAL LOW (ref 3.8–10.5)
WBC # FLD AUTO: 0.77 K/UL — CRITICAL LOW (ref 3.8–10.5)
WBC # FLD AUTO: 0.78 K/UL — CRITICAL LOW (ref 3.8–10.5)
WBC # FLD AUTO: 0.8 K/UL — CRITICAL LOW (ref 3.8–10.5)
WBC # FLD AUTO: 0.81 K/UL — CRITICAL LOW (ref 3.8–10.5)
WBC # FLD AUTO: 0.84 K/UL — CRITICAL LOW (ref 3.8–10.5)
WBC # FLD AUTO: 0.85 K/UL — CRITICAL LOW (ref 3.8–10.5)
WBC # FLD AUTO: 0.85 K/UL — CRITICAL LOW (ref 3.8–10.5)
WBC # FLD AUTO: 0.88 K/UL — CRITICAL LOW (ref 3.8–10.5)
WBC # FLD AUTO: 0.93 K/UL — CRITICAL LOW (ref 3.8–10.5)
WBC # FLD AUTO: 0.93 K/UL — CRITICAL LOW (ref 3.8–10.5)
WBC # FLD AUTO: 1.01 K/UL — LOW (ref 3.8–10.5)
WBC # FLD AUTO: 1.04 K/UL — LOW (ref 3.8–10.5)
WBC # FLD AUTO: 1.22 K/UL — LOW (ref 3.8–10.5)
WBC # FLD AUTO: 1.23 K/UL — LOW (ref 3.8–10.5)
WBC # FLD AUTO: 1.24 K/UL — LOW (ref 3.8–10.5)
WBC # FLD AUTO: 1.29 K/UL — LOW (ref 3.8–10.5)
WBC # FLD AUTO: 1.29 K/UL — LOW (ref 3.8–10.5)
WBC # FLD AUTO: 1.32 K/UL — LOW (ref 3.8–10.5)
WBC # FLD AUTO: 1.41 K/UL — LOW (ref 3.8–10.5)
WBC # FLD AUTO: 1.42 K/UL — LOW (ref 3.8–10.5)
WBC # FLD AUTO: 1.42 K/UL — LOW (ref 3.8–10.5)
WBC # FLD AUTO: 1.49 K/UL — LOW (ref 3.8–10.5)
WBC UR QL: 2 /HPF — SIGNIFICANT CHANGE UP (ref 0–5)

## 2023-01-01 PROCEDURE — 70450 CT HEAD/BRAIN W/O DYE: CPT | Mod: 26,77

## 2023-01-01 PROCEDURE — 82803 BLOOD GASES ANY COMBINATION: CPT

## 2023-01-01 PROCEDURE — 86900 BLOOD TYPING SEROLOGIC ABO: CPT

## 2023-01-01 PROCEDURE — 99215 OFFICE O/P EST HI 40 MIN: CPT

## 2023-01-01 PROCEDURE — 86922 COMPATIBILITY TEST ANTIGLOB: CPT

## 2023-01-01 PROCEDURE — 99204 OFFICE O/P NEW MOD 45 MIN: CPT | Mod: 95

## 2023-01-01 PROCEDURE — 84295 ASSAY OF SERUM SODIUM: CPT

## 2023-01-01 PROCEDURE — 81001 URINALYSIS AUTO W/SCOPE: CPT

## 2023-01-01 PROCEDURE — 99223 1ST HOSP IP/OBS HIGH 75: CPT

## 2023-01-01 PROCEDURE — 93308 TTE F-UP OR LMTD: CPT | Mod: 26

## 2023-01-01 PROCEDURE — 86901 BLOOD TYPING SEROLOGIC RH(D): CPT

## 2023-01-01 PROCEDURE — 82550 ASSAY OF CK (CPK): CPT

## 2023-01-01 PROCEDURE — 99291 CRITICAL CARE FIRST HOUR: CPT

## 2023-01-01 PROCEDURE — 96374 THER/PROPH/DIAG INJ IV PUSH: CPT

## 2023-01-01 PROCEDURE — 86850 RBC ANTIBODY SCREEN: CPT

## 2023-01-01 PROCEDURE — 85027 COMPLETE CBC AUTOMATED: CPT

## 2023-01-01 PROCEDURE — 70498 CT ANGIOGRAPHY NECK: CPT | Mod: 26,MA

## 2023-01-01 PROCEDURE — P9040: CPT

## 2023-01-01 PROCEDURE — 82435 ASSAY OF BLOOD CHLORIDE: CPT

## 2023-01-01 PROCEDURE — 86902 BLOOD TYPE ANTIGEN DONOR EA: CPT

## 2023-01-01 PROCEDURE — P9100: CPT

## 2023-01-01 PROCEDURE — 85396 CLOTTING ASSAY WHOLE BLOOD: CPT

## 2023-01-01 PROCEDURE — 85025 COMPLETE CBC W/AUTO DIFF WBC: CPT

## 2023-01-01 PROCEDURE — 83605 ASSAY OF LACTIC ACID: CPT

## 2023-01-01 PROCEDURE — 84484 ASSAY OF TROPONIN QUANT: CPT

## 2023-01-01 PROCEDURE — 84132 ASSAY OF SERUM POTASSIUM: CPT

## 2023-01-01 PROCEDURE — 70450 CT HEAD/BRAIN W/O DYE: CPT

## 2023-01-01 PROCEDURE — 99497 ADVNCD CARE PLAN 30 MIN: CPT | Mod: 25

## 2023-01-01 PROCEDURE — 99233 SBSQ HOSP IP/OBS HIGH 50: CPT

## 2023-01-01 PROCEDURE — 70496 CT ANGIOGRAPHY HEAD: CPT | Mod: 26,MA

## 2023-01-01 PROCEDURE — 87040 BLOOD CULTURE FOR BACTERIA: CPT

## 2023-01-01 PROCEDURE — 85018 HEMOGLOBIN: CPT

## 2023-01-01 PROCEDURE — C9254: CPT

## 2023-01-01 PROCEDURE — 87070 CULTURE OTHR SPECIMN AEROBIC: CPT

## 2023-01-01 PROCEDURE — 71045 X-RAY EXAM CHEST 1 VIEW: CPT | Mod: 26

## 2023-01-01 PROCEDURE — 36415 COLL VENOUS BLD VENIPUNCTURE: CPT

## 2023-01-01 PROCEDURE — 70496 CT ANGIOGRAPHY HEAD: CPT | Mod: MA

## 2023-01-01 PROCEDURE — 93308 TTE F-UP OR LMTD: CPT

## 2023-01-01 PROCEDURE — 71045 X-RAY EXAM CHEST 1 VIEW: CPT

## 2023-01-01 PROCEDURE — 82947 ASSAY GLUCOSE BLOOD QUANT: CPT

## 2023-01-01 PROCEDURE — P9037: CPT

## 2023-01-01 PROCEDURE — 70498 CT ANGIOGRAPHY NECK: CPT | Mod: MA

## 2023-01-01 PROCEDURE — 76937 US GUIDE VASCULAR ACCESS: CPT | Mod: 26

## 2023-01-01 PROCEDURE — 94003 VENT MGMT INPAT SUBQ DAY: CPT

## 2023-01-01 PROCEDURE — 99285 EMERGENCY DEPT VISIT HI MDM: CPT | Mod: 25

## 2023-01-01 PROCEDURE — 96375 TX/PRO/DX INJ NEW DRUG ADDON: CPT

## 2023-01-01 PROCEDURE — 86923 COMPATIBILITY TEST ELECTRIC: CPT

## 2023-01-01 PROCEDURE — 82330 ASSAY OF CALCIUM: CPT

## 2023-01-01 PROCEDURE — 36430 TRANSFUSION BLD/BLD COMPNT: CPT

## 2023-01-01 PROCEDURE — 85014 HEMATOCRIT: CPT

## 2023-01-01 PROCEDURE — 82962 GLUCOSE BLOOD TEST: CPT

## 2023-01-01 PROCEDURE — 85610 PROTHROMBIN TIME: CPT

## 2023-01-01 PROCEDURE — 80053 COMPREHEN METABOLIC PANEL: CPT

## 2023-01-01 PROCEDURE — 85730 THROMBOPLASTIN TIME PARTIAL: CPT

## 2023-01-01 PROCEDURE — 76937 US GUIDE VASCULAR ACCESS: CPT

## 2023-01-01 PROCEDURE — 83036 HEMOGLOBIN GLYCOSYLATED A1C: CPT

## 2023-01-01 RX ORDER — LABETALOL HCL 100 MG
5 TABLET ORAL ONCE
Refills: 0 | Status: COMPLETED | OUTPATIENT
Start: 2023-01-01 | End: 2023-01-01

## 2023-01-01 RX ORDER — HYDROMORPHONE HYDROCHLORIDE 2 MG/ML
1 INJECTION INTRAMUSCULAR; INTRAVENOUS; SUBCUTANEOUS
Refills: 0 | Status: DISCONTINUED | OUTPATIENT
Start: 2023-01-01 | End: 2023-01-01

## 2023-01-01 RX ORDER — ROBINUL 0.2 MG/ML
0.4 INJECTION INTRAMUSCULAR; INTRAVENOUS
Refills: 0 | Status: DISCONTINUED | OUTPATIENT
Start: 2023-01-01 | End: 2023-01-01

## 2023-01-01 RX ORDER — ROSUVASTATIN CALCIUM 10 MG/1
10 TABLET, FILM COATED ORAL
Qty: 90 | Refills: 0 | Status: DISCONTINUED | COMMUNITY
Start: 2022-01-01 | End: 2023-01-01

## 2023-01-01 RX ORDER — ONDANSETRON 8 MG/1
4 TABLET, FILM COATED ORAL ONCE
Refills: 0 | Status: COMPLETED | OUTPATIENT
Start: 2023-01-01 | End: 2023-01-01

## 2023-01-01 RX ORDER — LEVETIRACETAM 250 MG/1
2000 TABLET, FILM COATED ORAL ONCE
Refills: 0 | Status: COMPLETED | OUTPATIENT
Start: 2023-01-01 | End: 2023-01-01

## 2023-01-01 RX ORDER — MIRTAZAPINE 15 MG/1
15 TABLET, FILM COATED ORAL
Qty: 30 | Refills: 6 | Status: ACTIVE | COMMUNITY
Start: 2023-01-01 | End: 1900-01-01

## 2023-01-01 RX ORDER — NYSTATIN 100000 [USP'U]/ML
100000 SUSPENSION ORAL
Qty: 200 | Refills: 2 | Status: DISCONTINUED | COMMUNITY
Start: 2023-01-01 | End: 2023-01-01

## 2023-01-01 RX ORDER — SIMVASTATIN 20 MG/1
20 TABLET, FILM COATED ORAL
Qty: 90 | Refills: 0 | Status: DISCONTINUED | COMMUNITY
Start: 2022-01-01 | End: 2023-01-01

## 2023-01-01 RX ORDER — CHLORHEXIDINE GLUCONATE 213 G/1000ML
15 SOLUTION TOPICAL
Refills: 0 | Status: DISCONTINUED | OUTPATIENT
Start: 2023-01-01 | End: 2023-01-01

## 2023-01-01 RX ORDER — INSULIN LISPRO 100/ML
VIAL (ML) SUBCUTANEOUS EVERY 6 HOURS
Refills: 0 | Status: DISCONTINUED | OUTPATIENT
Start: 2023-01-01 | End: 2023-01-01

## 2023-01-01 RX ORDER — NYSTATIN 100000 [USP'U]/ML
100000 SUSPENSION ORAL
Qty: 200 | Refills: 2 | Status: ACTIVE | COMMUNITY
Start: 2023-01-01 | End: 1900-01-01

## 2023-01-01 RX ORDER — ROSUVASTATIN CALCIUM 5 MG/1
1 TABLET ORAL
Qty: 0 | Refills: 0 | DISCHARGE

## 2023-01-01 RX ORDER — ACETAMINOPHEN 500 MG
1000 TABLET ORAL ONCE
Refills: 0 | Status: COMPLETED | OUTPATIENT
Start: 2023-01-01 | End: 2023-01-01

## 2023-01-01 RX ORDER — VALACYCLOVIR HYDROCHLORIDE 500 MG/1
500 TABLET, FILM COATED ORAL TWICE DAILY
Refills: 0 | Status: ACTIVE | COMMUNITY
Start: 2023-01-01

## 2023-01-01 RX ORDER — SODIUM CHLORIDE 9 MG/ML
500 INJECTION INTRAMUSCULAR; INTRAVENOUS; SUBCUTANEOUS ONCE
Refills: 0 | Status: DISCONTINUED | OUTPATIENT
Start: 2023-01-01 | End: 2023-01-01

## 2023-01-01 RX ORDER — VENETOCLAX 100 MG/1
100 TABLET, FILM COATED ORAL
Qty: 120 | Refills: 5 | Status: ACTIVE | COMMUNITY
Start: 2022-01-01 | End: 1900-01-01

## 2023-01-01 RX ORDER — VALACYCLOVIR 500 MG/1
500 TABLET, FILM COATED ORAL
Qty: 60 | Refills: 6 | Status: ACTIVE | COMMUNITY
Start: 2023-01-01 | End: 1900-01-01

## 2023-01-01 RX ORDER — DOCUSATE SODIUM 100 MG/1
100 CAPSULE, LIQUID FILLED ORAL
Qty: 60 | Refills: 0 | Status: ACTIVE | COMMUNITY
Start: 2023-01-01

## 2023-01-01 RX ORDER — POSACONAZOLE 100 MG/1
100 TABLET, DELAYED RELEASE ORAL
Qty: 90 | Refills: 6 | Status: ACTIVE | COMMUNITY
Start: 2023-01-01 | End: 1900-01-01

## 2023-01-01 RX ORDER — ONDANSETRON 8 MG/1
4 TABLET, FILM COATED ORAL EVERY 6 HOURS
Refills: 0 | Status: DISCONTINUED | OUTPATIENT
Start: 2023-01-01 | End: 2023-01-01

## 2023-01-01 RX ORDER — FERROUS SULFATE 325(65) MG
1 TABLET ORAL
Qty: 0 | Refills: 0 | DISCHARGE

## 2023-01-01 RX ORDER — ETOMIDATE 2 MG/ML
20 INJECTION INTRAVENOUS ONCE
Refills: 0 | Status: COMPLETED | OUTPATIENT
Start: 2023-01-01 | End: 2023-01-01

## 2023-01-01 RX ORDER — PANTOPRAZOLE SODIUM 20 MG/1
40 TABLET, DELAYED RELEASE ORAL ONCE
Refills: 0 | Status: DISCONTINUED | OUTPATIENT
Start: 2023-01-01 | End: 2023-01-01

## 2023-01-01 RX ORDER — POSACONAZOLE 100 MG/1
100 TABLET, DELAYED RELEASE ORAL
Refills: 0 | Status: ACTIVE | COMMUNITY
Start: 2023-01-01

## 2023-01-01 RX ORDER — DRONABINOL 2.5 MG/1
2.5 CAPSULE ORAL
Qty: 60 | Refills: 0 | Status: ACTIVE | COMMUNITY
Start: 2023-01-01 | End: 1900-01-01

## 2023-01-01 RX ORDER — ENALAPRIL MALEATE 20 MG/1
20 TABLET ORAL
Qty: 90 | Refills: 0 | Status: DISCONTINUED | COMMUNITY
Start: 2022-01-01 | End: 2023-01-01

## 2023-01-01 RX ORDER — ROCURONIUM BROMIDE 10 MG/ML
80 VIAL (ML) INTRAVENOUS ONCE
Refills: 0 | Status: COMPLETED | OUTPATIENT
Start: 2023-01-01 | End: 2023-01-01

## 2023-01-01 RX ORDER — LEVOFLOXACIN 500 MG/1
500 TABLET, FILM COATED ORAL DAILY
Refills: 0 | Status: ACTIVE | COMMUNITY
Start: 2023-01-01

## 2023-01-01 RX ORDER — LEVOFLOXACIN 500 MG/1
500 TABLET, FILM COATED ORAL
Qty: 30 | Refills: 6 | Status: ACTIVE | COMMUNITY
Start: 2023-01-01 | End: 1900-01-01

## 2023-01-01 RX ORDER — SODIUM CHLORIDE 5 G/100ML
250 INJECTION, SOLUTION INTRAVENOUS ONCE
Refills: 0 | Status: COMPLETED | OUTPATIENT
Start: 2023-01-01 | End: 2023-01-01

## 2023-01-01 RX ORDER — MANNITOL
60 POWDER (GRAM) MISCELLANEOUS ONCE
Refills: 0 | Status: DISCONTINUED | OUTPATIENT
Start: 2023-01-01 | End: 2023-01-01

## 2023-01-01 RX ORDER — ONDANSETRON 4 MG/1
4 TABLET ORAL
Qty: 45 | Refills: 3 | Status: ACTIVE | COMMUNITY
Start: 2023-01-01 | End: 1900-01-01

## 2023-01-01 RX ORDER — METFORMIN HYDROCHLORIDE 850 MG/1
2 TABLET ORAL
Qty: 0 | Refills: 0 | DISCHARGE

## 2023-01-01 RX ORDER — MANNITOL
70 POWDER (GRAM) MISCELLANEOUS ONCE
Refills: 0 | Status: COMPLETED | OUTPATIENT
Start: 2023-01-01 | End: 2023-01-01

## 2023-01-01 RX ORDER — LACOSAMIDE 50 MG/1
100 TABLET ORAL EVERY 12 HOURS
Refills: 0 | Status: DISCONTINUED | OUTPATIENT
Start: 2023-01-01 | End: 2023-01-01

## 2023-01-01 RX ORDER — HYDROMORPHONE HYDROCHLORIDE 2 MG/ML
1 INJECTION INTRAMUSCULAR; INTRAVENOUS; SUBCUTANEOUS
Qty: 100 | Refills: 0 | Status: DISCONTINUED | OUTPATIENT
Start: 2023-01-01 | End: 2023-01-01

## 2023-01-01 RX ORDER — PROPOFOL 10 MG/ML
20 INJECTION, EMULSION INTRAVENOUS
Qty: 500 | Refills: 0 | Status: DISCONTINUED | OUTPATIENT
Start: 2023-01-01 | End: 2023-01-01

## 2023-01-01 RX ADMIN — ETOMIDATE 20 MILLIGRAM(S): 2 INJECTION INTRAVENOUS at 12:35

## 2023-01-01 RX ADMIN — LACOSAMIDE 120 MILLIGRAM(S): 50 TABLET ORAL at 17:36

## 2023-01-01 RX ADMIN — CHLORHEXIDINE GLUCONATE 15 MILLILITER(S): 213 SOLUTION TOPICAL at 05:55

## 2023-01-01 RX ADMIN — Medication 5 MILLIGRAM(S): at 18:15

## 2023-01-01 RX ADMIN — HYDROMORPHONE HYDROCHLORIDE 1 MG/HR: 2 INJECTION INTRAMUSCULAR; INTRAVENOUS; SUBCUTANEOUS at 17:35

## 2023-01-01 RX ADMIN — CHLORHEXIDINE GLUCONATE 15 MILLILITER(S): 213 SOLUTION TOPICAL at 18:25

## 2023-01-01 RX ADMIN — Medication 400 MILLIGRAM(S): at 15:00

## 2023-01-01 RX ADMIN — PROPOFOL 9.36 MICROGRAM(S)/KG/MIN: 10 INJECTION, EMULSION INTRAVENOUS at 13:21

## 2023-01-01 RX ADMIN — Medication 1 MILLIGRAM(S): at 01:25

## 2023-01-01 RX ADMIN — ROBINUL 0.4 MILLIGRAM(S): 0.2 INJECTION INTRAMUSCULAR; INTRAVENOUS at 00:50

## 2023-01-01 RX ADMIN — PROPOFOL 9.36 MICROGRAM(S)/KG/MIN: 10 INJECTION, EMULSION INTRAVENOUS at 21:26

## 2023-01-01 RX ADMIN — HYDROMORPHONE HYDROCHLORIDE 1 MG/HR: 2 INJECTION INTRAMUSCULAR; INTRAVENOUS; SUBCUTANEOUS at 21:26

## 2023-01-01 RX ADMIN — LACOSAMIDE 120 MILLIGRAM(S): 50 TABLET ORAL at 05:55

## 2023-01-01 RX ADMIN — CHLORHEXIDINE GLUCONATE 15 MILLILITER(S): 213 SOLUTION TOPICAL at 17:37

## 2023-01-01 RX ADMIN — SODIUM CHLORIDE 750 MILLILITER(S): 5 INJECTION, SOLUTION INTRAVENOUS at 13:10

## 2023-01-01 RX ADMIN — Medication 80 MILLIGRAM(S): at 12:35

## 2023-01-01 RX ADMIN — Medication 1 MILLIGRAM(S): at 14:11

## 2023-01-01 RX ADMIN — Medication 1000 MILLIGRAM(S): at 15:15

## 2023-01-01 RX ADMIN — LEVETIRACETAM 600 MILLIGRAM(S): 250 TABLET, FILM COATED ORAL at 12:00

## 2023-01-01 RX ADMIN — LACOSAMIDE 120 MILLIGRAM(S): 50 TABLET ORAL at 16:16

## 2023-01-01 RX ADMIN — Medication 4: at 18:25

## 2023-01-01 RX ADMIN — HYDROMORPHONE HYDROCHLORIDE 1 MG/HR: 2 INJECTION INTRAMUSCULAR; INTRAVENOUS; SUBCUTANEOUS at 18:00

## 2023-01-01 RX ADMIN — Medication 700 GRAM(S): at 13:35

## 2023-01-01 RX ADMIN — ONDANSETRON 4 MILLIGRAM(S): 8 TABLET, FILM COATED ORAL at 11:01

## 2023-01-01 RX ADMIN — Medication 400 MILLIGRAM(S): at 18:00

## 2023-01-01 RX ADMIN — Medication 1000 MILLIGRAM(S): at 18:30

## 2023-01-03 NOTE — ASSESSMENT
[FreeTextEntry1] : 71 yo M with a PMH of DM2, HTN, and HLD who presents with 3 weeks of fatigue, poor PO intake and exertional dyspnea, found to have anemia/thrombocytopenia diagnosed with Myelodysplastic syndrome with low blasts and increase p53 s/p Azacitidine and Venetoclex Induction now Day +19 presenting for f/u visit. COVID Pos as of 12/30.  \par \par Patient no longer with runny nose.Continues with decreased appetite and fatigue especially with ADLs. Last BM today. Continues on Venetoclax treatment. Denies fever, chills, n/v/d, cp, sob, sore throat, abdominal pain, bruising. On exam VSS, Lungs CTA b/l, HR:RRR. \par \par \par Hgb:9.5, Plts:46K , ANC: 920\par \par Plan:\par PLT Goal > 10,000; Hgb goal > 7.0g/dl\par Venetoclax 400mg daily \par ID: Started on Levaquin, Posaconazole, Valtrex ppx \par COVID PCR pos 12/30\par Nutrition: Tolerating po , decreased appetite \par DVT ppx: hold LWHx secondary to low PLT count \par Instructed patient to measure temperature three times a day and to call Clinic if temp >38C and go to the Emergency Room. \par Patient to be discharged from [facility] early d/c program and scheduled to f/u with  on 1/6 and possible plts. \par Scheduled for possible blood transfusion and/or plts on 1/7.\par

## 2023-01-03 NOTE — HISTORY OF PRESENT ILLNESS
[de-identified] : 73 yo M with a PMH of DM2, HTN, and HLD who presents with 3 weeks of fatigue, poor PO intake and exertional dyspnea, found to have anemia/thrombocytopenia diagnosed with Myelodysplastic syndrome with low blasts and increase p53 s/p Azacitidine and Venetoclex Induction now Day +19 presenting for f/u visit. COVID Pos as of 12/30.  \par \par Patient no longer with runny nose.Continues with decreased appetite and fatigue especially with ADLs. Last BM today. Continues on Venetoclax treatment. Denies fever, chills, n/v/d, cp, sob, sore throat, abdominal pain, bruising. On exam VSS, Lungs CTA b/l, HR:RRR. \par \par \par

## 2023-01-04 PROBLEM — U07.1 COVID-19 VIRUS DETECTED: Status: ACTIVE | Noted: 2023-01-01

## 2023-01-06 NOTE — ASSESSMENT
[FreeTextEntry1] : 73 yo M with a PMH of DM2, HTN, and HLD who presents with 3 weeks of fatigue, poor PO intake and exertional dyspnea, found to have bi-cytopenia.\par \par Patient was recently diagnosed with MDS TP 53 mutation, found to have bi-cytopenia. 11/29/22-Bone Marrow bx revealed MDS ( 3% blasts) with TP53 mutation. \par \par ·  12/16 - Started Vidaza 75mg/m2= 148mg subcutaneously qd x 7d, Venetoclax 100mg x1d, 200mg x1d, 400mg thereafter. \par Patient is now neutropenic and the dose of Venetoclax   was reduced to 100 mg daily. \par \par Neutropenic : On Posaconazole, levo and acyclovir. \par Continue Venetoclax 100 mg daily, Azacitidine 75 mg m2 daily x 7 days ( due on 1/16/23)\par Will check CBC twice  per week. \par WBC 1.04, Hb 8.6 g/dl, Hct 25.7%, PLT 31.\par \par T2 DM on Metformin\par \par HLD: Hold rosuvastatin. \par \par \par RTC 3 weeks\par \par

## 2023-01-06 NOTE — HISTORY OF PRESENT ILLNESS
[1 - Distress Level] : Distress Level: 1 [80: Normal activity with effort; some signs or symptoms of disease.] : 80: Normal activity with effort; some signs or symptoms of disease.  [de-identified] : 71 yo M with a PMH of DM2, HTN, and HLD who presents with 3 weeks of fatigue, poor PO intake and exertional dyspnea, found to have bi-cytopenia.\par \par #Bi-Cytopenia\par - Patient developing ZAPATA and new anemia/thrombocytopenia over the past couple months\par - Hb 12.3 (08/2022) --> 10.7 (10/2022) --> 7.9 (admission)\par - Plts 216 (08/2022) --> 193 (10/2022) --> 57 (admission)\par - Patient has been on PO iron outpatient and received iron sucrose in ED\par - Iron studies show normal iron with TIBC (pre-transfusion) but ferritin markedly elevated at 1218\par - B12 and folate normal, and labs not c/w hemolysis. Reticulocyte count inappropriately normal\par - Patient uses alcohol but no evidence of liver disease, s/p US showing no HSM\par - Peripheral smear personally reviewed and interpreted, showing slightly microcytic and hypochromic cells, occasional teardrop cells, rare platelet clumps, but no schistocytes\par - S/p 1U PRBCs in ED\par \par 11/29/22- Bone marrow biopsy c/w Myelodysplastic syndrome with low blasts and increase p53 staining by immunohistochemistry. Cytogenetics, FISH pending. \par \par Patient complaints of having sob with minimal exertion, denies chest pain, bleeding, fevers, night sweats or weight loss.  [de-identified] : Patient was recently diagnosed with MDS TP 53 mutation, found to have bi-cytopenia. 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 \par \par ·  12/16 - Started Vidaza 75mg/m2= 148mg subcutaneously qd x 7d, Venetoclax 100mg x1d, 200mg x1d, 400mg thereafter. \par Patient is now neutropenic and the dose of Venetoclax   was reduced to 100 mg daily. \par \par Denies fevers, night sweats or weight loss. Denies bleeding. \par \par No other changes in medical, surgical or social history since  1/3/23. \par \par \par \par \par \par \par

## 2023-01-06 NOTE — REVIEW OF SYSTEMS
[Negative] : Allergic/Immunologic [Fatigue] : fatigue [SOB on Exertion] : shortness of breath during exertion [Abdominal Pain] : abdominal pain [FreeTextEntry7] : nausea

## 2023-01-13 PROBLEM — B37.0 THRUSH: Status: ACTIVE | Noted: 2023-01-01

## 2023-01-13 NOTE — HISTORY OF PRESENT ILLNESS
[1 - Distress Level] : Distress Level: 1 [80: Normal activity with effort; some signs or symptoms of disease.] : 80: Normal activity with effort; some signs or symptoms of disease.  [de-identified] : 71 yo M with a PMH of DM2, HTN, and HLD who presents with 3 weeks of fatigue, poor PO intake and exertional dyspnea, found to have bi-cytopenia.\par \par #Bi-Cytopenia\par - Patient developing ZAPATA and new anemia/thrombocytopenia over the past couple months\par - Hb 12.3 (08/2022) --> 10.7 (10/2022) --> 7.9 (admission)\par - Plts 216 (08/2022) --> 193 (10/2022) --> 57 (admission)\par - Patient has been on PO iron outpatient and received iron sucrose in ED\par - Iron studies show normal iron with TIBC (pre-transfusion) but ferritin markedly elevated at 1218\par - B12 and folate normal, and labs not c/w hemolysis. Reticulocyte count inappropriately normal\par - Patient uses alcohol but no evidence of liver disease, s/p US showing no HSM\par - Peripheral smear personally reviewed and interpreted, showing slightly microcytic and hypochromic cells, occasional teardrop cells, rare platelet clumps, but no schistocytes\par - S/p 1U PRBCs in ED\par \par 11/29/22- Bone marrow biopsy c/w Myelodysplastic syndrome with low blasts and increase p53 staining by immunohistochemistry. Cytogenetics, FISH pending. \par \par Patient complaints of having sob with minimal exertion, denies chest pain, bleeding, fevers, night sweats or weight loss.  [de-identified] : Patient was recently diagnosed with MDS TP 53 mutation, found to have bi-cytopenia. 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 \par \par ·  12/16 - Started Vidaza 75mg/m2= 148mg subcutaneously qd x 7d, Venetoclax 100mg x1d, 200mg x1d, 400mg thereafter. \par Patient is now neutropenic and the dose of Venetoclax   was reduced to 100 mg daily. \par \par Denies fevers, night sweats or weight loss. Denies bleeding. Patient complaints of new onset of dizziness and decreased appetite. \par \par No other changes in medical, surgical or social history since  1/6/23. \par \par \par \par \par \par \par

## 2023-01-13 NOTE — REVIEW OF SYSTEMS
[Fatigue] : fatigue [SOB on Exertion] : shortness of breath during exertion [Negative] : Allergic/Immunologic [Abdominal Pain] : no abdominal pain [FreeTextEntry7] : decrease appetite

## 2023-01-13 NOTE — ASSESSMENT
[FreeTextEntry1] : 71 yo M with a PMH of DM2, HTN, and HLD who presents with 3 weeks of fatigue, poor PO intake and exertional dyspnea, found to have bi-cytopenia.\par \par Patient was recently diagnosed with MDS TP 53 mutation, found to have bi-cytopenia. 11/29/22-Bone Marrow bx revealed MDS ( 3% blasts) with TP53 mutation. \par \par ·  12/16 - Started Vidaza 75mg/m2= 148mg subcutaneously qd x 7d, Venetoclax 100mg x1d, 200mg x1d, 400mg thereafter. \par Patient is now neutropenic and the dose of Venetoclax   was reduced to 100 mg daily. \par \par Neutropenic : On Posaconazole, levo and acyclovir. \par Continue Venetoclax 100 mg daily, Azacitidine 75 mg m2 daily x 7 days ( due on 1/16/23)\par Will check CBC twice  per week. \par WBC 0.60, Hb 7.3 g/dl, Hct 21.5%, PLT 40; Transfuse one unit of PRBCs\par \par Thrush: Started on Nystatin 100,000 switch and swallow.\par \par T2 DM on Metformin\par \par HLD: Hold rosuvastatin. \par \par \par RTC 3 weeks\par \par

## 2023-02-03 NOTE — HISTORY OF PRESENT ILLNESS
[1 - Distress Level] : Distress Level: 1 [80: Normal activity with effort; some signs or symptoms of disease.] : 80: Normal activity with effort; some signs or symptoms of disease.  [de-identified] : 73 yo M with a PMH of DM2, HTN, and HLD who presents with 3 weeks of fatigue, poor PO intake and exertional dyspnea, found to have bi-cytopenia.\par \par #Bi-Cytopenia\par - Patient developing ZAPATA and new anemia/thrombocytopenia over the past couple months\par - Hb 12.3 (08/2022) --> 10.7 (10/2022) --> 7.9 (admission)\par - Plts 216 (08/2022) --> 193 (10/2022) --> 57 (admission)\par - Patient has been on PO iron outpatient and received iron sucrose in ED\par - Iron studies show normal iron with TIBC (pre-transfusion) but ferritin markedly elevated at 1218\par - B12 and folate normal, and labs not c/w hemolysis. Reticulocyte count inappropriately normal\par - Patient uses alcohol but no evidence of liver disease, s/p US showing no HSM\par - Peripheral smear personally reviewed and interpreted, showing slightly microcytic and hypochromic cells, occasional teardrop cells, rare platelet clumps, but no schistocytes\par - S/p 1U PRBCs in ED\par \par 11/29/22- Bone marrow biopsy c/w Myelodysplastic syndrome with low blasts and increase p53 staining by immunohistochemistry. Cytogenetics, FISH pending. \par \par Patient complaints of having sob with minimal exertion, denies chest pain, bleeding, fevers, night sweats or weight loss.  [de-identified] : Patient was recently diagnosed with MDS TP 53 mutation, found to have bi-cytopenia. 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 \par \par 12/16 - Started Vidaza 75mg/m2= 148mg subcutaneously qd x 7d, Venetoclax 100mg x1d, 200mg x1d, 400mg thereafter. \par Patient was  neutropenic and the dose of Venetoclax   was reduced to 100 mg daily. \par \par Denies fevers, night sweats or weight loss. Denies bleeding. \par \par No other changes in medical, surgical or social history since  1/13/23. \par \par \par \par \par \par \par

## 2023-03-10 NOTE — HISTORY OF PRESENT ILLNESS
[1 - Distress Level] : Distress Level: 1 [80: Normal activity with effort; some signs or symptoms of disease.] : 80: Normal activity with effort; some signs or symptoms of disease.  [de-identified] : 73 yo M with a PMH of DM2, HTN, and HLD who presents with 3 weeks of fatigue, poor PO intake and exertional dyspnea, found to have bi-cytopenia.\par \par #Bi-Cytopenia\par - Patient developing ZAPATA and new anemia/thrombocytopenia over the past couple months\par - Hb 12.3 (08/2022) --> 10.7 (10/2022) --> 7.9 (admission)\par - Plts 216 (08/2022) --> 193 (10/2022) --> 57 (admission)\par - Patient has been on PO iron outpatient and received iron sucrose in ED\par - Iron studies show normal iron with TIBC (pre-transfusion) but ferritin markedly elevated at 1218\par - B12 and folate normal, and labs not c/w hemolysis. Reticulocyte count inappropriately normal\par - Patient uses alcohol but no evidence of liver disease, s/p US showing no HSM\par - Peripheral smear personally reviewed and interpreted, showing slightly microcytic and hypochromic cells, occasional teardrop cells, rare platelet clumps, but no schistocytes\par - S/p 1U PRBCs in ED\par \par 11/29/22- Bone marrow biopsy c/w Myelodysplastic syndrome with low blasts and increase p53 staining by immunohistochemistry. Cytogenetics, FISH pending. \par \par Patient complaints of having sob with minimal exertion, denies chest pain, bleeding, fevers, night sweats or weight loss.  [de-identified] : Patient was recently diagnosed with MDS TP 53 mutation, found to have bi-cytopenia. 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 \par \par 12/16 - Started Vidaza 75mg/m2= 148mg subcutaneously qd x 7d, Venetoclax 100mg x1d, 200mg x1d, 400mg thereafter. \par Patient was  neutropenic and the dose of Venetoclax   was reduced to 100 mg daily. \par \par Denies fevers, night sweats or weight loss. Denies bleeding. \par \par No other changes in medical, surgical or social history since  2/3/23. \par \par \par \par \par \par \par

## 2023-03-10 NOTE — ASSESSMENT
[FreeTextEntry1] : 72 yo M with a PMH of DM2, HTN, and HLD who presents with 3 weeks of fatigue, poor PO intake and exertional dyspnea, found to have bi-cytopenia.\par \par Patient was recently diagnosed with MDS TP 53 mutation, found to have bi-cytopenia. 11/29/22-Bone Marrow bx revealed MDS ( 3% blasts) with TP53 mutation. \par \par 12/16 - Started Vidaza 75mg/m2= 148mg subcutaneously qd x 7d, Venetoclax 100mg x1d, 200mg x1d, 400mg thereafter. \par Patient was neutropenic and the dose of Venetoclax   was reduced to 100 mg daily. \par \par Plan to Dc Azicitidine to decitabine with next cycle. \par \par Neutropenic : On Posaconazole, levo and acyclovir. \par Continue Venetoclax 100 mg daily,  S/P C3 Azacitidine 75 mg m2 daily x 7 days ( 1/16/23)\par Will check CBC twice  per week. \par WBC 0.45, Hb 8.7 g/dl, PLT 65. \par \par T2 DM on Metformin\par \par HLD: Hold rosuvastatin. \par \par \par RTC 4 weeks\par \par

## 2023-04-07 NOTE — HISTORY OF PRESENT ILLNESS
[1 - Distress Level] : Distress Level: 1 [80: Normal activity with effort; some signs or symptoms of disease.] : 80: Normal activity with effort; some signs or symptoms of disease.  [de-identified] : 73 yo M with a PMH of DM2, HTN, and HLD who presents with 3 weeks of fatigue, poor PO intake and exertional dyspnea, found to have bi-cytopenia.\par \par #Bi-Cytopenia\par - Patient developing ZAPATA and new anemia/thrombocytopenia over the past couple months\par - Hb 12.3 (08/2022) --> 10.7 (10/2022) --> 7.9 (admission)\par - Plts 216 (08/2022) --> 193 (10/2022) --> 57 (admission)\par - Patient has been on PO iron outpatient and received iron sucrose in ED\par - Iron studies show normal iron with TIBC (pre-transfusion) but ferritin markedly elevated at 1218\par - B12 and folate normal, and labs not c/w hemolysis. Reticulocyte count inappropriately normal\par - Patient uses alcohol but no evidence of liver disease, s/p US showing no HSM\par - Peripheral smear personally reviewed and interpreted, showing slightly microcytic and hypochromic cells, occasional teardrop cells, rare platelet clumps, but no schistocytes\par - S/p 1U PRBCs in ED\par \par 11/29/22- Bone marrow biopsy c/w Myelodysplastic syndrome with low blasts and increase p53 staining by immunohistochemistry. Cytogenetics, FISH pending. \par \par Patient complaints of having sob with minimal exertion, denies chest pain, bleeding, fevers, night sweats or weight loss.  [de-identified] : Patient was recently diagnosed with MDS TP 53 mutation, found to have bi-cytopenia. 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 \par \par 12/16 - Started Vidaza 75mg/m2= 148mg subcutaneously qd x 7d, Venetoclax 100mg x1d, 200mg x1d, 400mg thereafter. \par Patient was  neutropenic and the dose of Venetoclax   was reduced to 100 mg daily. \par \par Denies fevers, night sweats or weight loss. Denies bleeding. \par \par No other changes in medical, surgical or social history since  3/10/23. \par \par \par \par \par \par \par

## 2023-04-07 NOTE — ASSESSMENT
[FreeTextEntry1] : 72 yo M with a PMH of DM2, HTN, and HLD who presents with 3 weeks of fatigue, poor PO intake and exertional dyspnea, found to have bi-cytopenia.\par \par Patient was recently diagnosed with MDS TP 53 mutation, found to have bi-cytopenia. 11/29/22-Bone Marrow bx revealed MDS ( 3% blasts) with TP53 mutation. \par \par 12/16 - Started Vidaza 75mg/m2= 148mg subcutaneously qd x 7d, Venetoclax 100mg x1d, 200mg x1d, 400mg thereafter. \par Patient was neutropenic and the dose of Venetoclax   was reduced to 100 mg daily. \par \par Plan to DC Azicitidine to decitabine 20 mg /m2 IV days 1-5 every 28 days.  Plan to start treatment on 4/10/23. \par \par Neutropenic : On Posaconazole, levo and acyclovir. \par Continue Venetoclax 100 mg daily, \par Will check CBC twice  per week. \par WBC 0.38, Hb 7.4 g/dl, . Received one unit of PRBCs today.  \par \par T2 DM on Metformin\par \par HLD: Hold rosuvastatin. \par \par \par RTC 4 weeks\par \par

## 2023-05-10 NOTE — PATIENT PROFILE ADULT - FOOD INSECURITY
Brief Operative Note    Patient: Maryan Cote 41 year old female    MRN: [id number]    Surgeon(s): Tyron Zayas DO  Phone Number: [phone]                       Surgeon(s) and Role:     * Tyron Zayas DO - Primary    Assistant(s): amauri    Pre-Op Diagnosis: Urinary retention [R33.9]  Pelvic pain in female [R10.2]  Complication of implanted vaginal mesh, initial encounter [T85.9XXA]  Bladder pain [R39.89]  Voiding dysfunction [N39.8]     Post-Op Diagnosis: same     Procedure: Procedure(s):  EXCISION OF VAGINAL MESH    Anesthesia Type: General                                   Complications: None         Specimens Removed:   ID Type Source Tests Collected by Time   A : sling mesh Tissue Vagina SURGICAL PATHOLOGY Tyron Zayas DO 5/10/2023 1201   B : vaginal mesh anterior wall Tissue Vagina SURGICAL PATHOLOGY Tyron Zayas,  5/10/2023 1206        Estimated Blood Loss: 50 cc          Implants: * No implants in log *      I was present for the key portions of the procedure and was immediately available for the non-key portions      
no

## 2023-05-16 PROBLEM — R50.9 ELEVATED TEMPERATURE: Status: ACTIVE | Noted: 2023-01-01

## 2023-05-16 NOTE — PHYSICAL EXAM
[No Acute Distress] : no acute distress [Well-Appearing] : well-appearing [No Respiratory Distress] : no respiratory distress  [de-identified] : EXAM: NAD, no nasal flaring, no tachypnea, well apearing

## 2023-05-16 NOTE — HISTORY OF PRESENT ILLNESS
[Home] : at home, [unfilled] , at the time of the visit. [Other Location: e.g. Home (Enter Location, City,State)___] : at [unfilled] [Verbal consent obtained from patient] : the patient, [unfilled] [FreeTextEntry8] : 73yoM; with PMH signif for MDS (s/p C2 Dacogen), HTN, HLD, DM; now p/w warm sensation x2 hours. states he took temperature and had Tm of 100.2.  states he took tylenol 30min prior to call and now has temp of 100.1.  denies chills or sweats. denies headache or neck pain. denies n/v. denies dizziness. denies cp/sob/palp. denies cough. denies abd pain. denies dysuria, frequency, urgency. denies sick contacts.  found to be neutropenic this week. was given blood transfusion and chemo 3 days ago.  \par PMH: MDS, HTN, HLD\par

## 2023-05-16 NOTE — REVIEW OF SYSTEMS
[Fever] : no fever [Chills] : no chills [Fatigue] : no fatigue [Earache] : no earache [Nasal Discharge] : no nasal discharge [Chest Pain] : no chest pain [Shortness Of Breath] : no shortness of breath [Cough] : no cough [Abdominal Pain] : no abdominal pain [Nausea] : no nausea [Diarrhea] : no diarrhea [Vomiting] : no vomiting [Dysuria] : no dysuria [Back Pain] : no back pain [Headache] : no headache [Dizziness] : no dizziness

## 2023-05-16 NOTE — ASSESSMENT
[FreeTextEntry1] : 73yoM; with PMH signif for MDS (currently on chemo and neutropenic, on prophylactic abx: Levaquin and Acyclovir); now p/w temperature of 100.2.\par -spoke with heme/onc fellow who states patient does not need to come to ED unless temp is >101. also advised that patient can continue with tylenol and only if temperature >101F with antipyretic, does patient need to go to ED for blood cultures\par -spoke with patient regarding these instructions\par -seek immediate attention in the ER if any fever >101, chest pain, sob, dizziness, nausea, vomiting, headache, neck pain, abd pain, or any other acute causes of concern.

## 2023-05-19 NOTE — ASSESSMENT
[FreeTextEntry1] : 72 yo M with a PMH of DM2, HTN, and HLD who presents with 3 weeks of fatigue, poor PO intake and exertional dyspnea, found to have bi-cytopenia.\par \par Patient was recently diagnosed with MDS TP 53 mutation, found to have bi-cytopenia. 11/29/22-Bone Marrow bx revealed MDS ( 3% blasts) with TP53 mutation. \par \par 12/16 - Started Vidaza 75mg/m2= 148mg subcutaneously qd x 7d, Venetoclax 100mg x1d, 200mg x1d, 400mg thereafter. \par Patient was neutropenic and the dose of Venetoclax   was reduced to 100 mg daily. \par \par Plan to DC Azicitidine to decitabine 20 mg /m2 IV days 1-5 every 28 days.  Plan to start treatment on 4/10/23. \par \par Neutropenic : On Posaconazole, levo and acyclovir. \par Continue Venetoclax 100 mg daily, \par Will check CBC twice  per week. \par Hb 8.5g/dl, PLTs 21K\par \par T2 DM on Metformin\par \par HLD: Hold rosuvastatin. \par \par Poor appetite: Mirtazapine 15 mg at bedtime. \par \par \par RTC 4 weeks\par \par

## 2023-05-19 NOTE — HISTORY OF PRESENT ILLNESS
[1 - Distress Level] : Distress Level: 1 [80: Normal activity with effort; some signs or symptoms of disease.] : 80: Normal activity with effort; some signs or symptoms of disease.  [de-identified] : 73 yo M with a PMH of DM2, HTN, and HLD who presents with 3 weeks of fatigue, poor PO intake and exertional dyspnea, found to have bi-cytopenia.\par \par #Bi-Cytopenia\par - Patient developing ZAPATA and new anemia/thrombocytopenia over the past couple months\par - Hb 12.3 (08/2022) --> 10.7 (10/2022) --> 7.9 (admission)\par - Plts 216 (08/2022) --> 193 (10/2022) --> 57 (admission)\par - Patient has been on PO iron outpatient and received iron sucrose in ED\par - Iron studies show normal iron with TIBC (pre-transfusion) but ferritin markedly elevated at 1218\par - B12 and folate normal, and labs not c/w hemolysis. Reticulocyte count inappropriately normal\par - Patient uses alcohol but no evidence of liver disease, s/p US showing no HSM\par - Peripheral smear personally reviewed and interpreted, showing slightly microcytic and hypochromic cells, occasional teardrop cells, rare platelet clumps, but no schistocytes\par - S/p 1U PRBCs in ED\par \par 11/29/22- Bone marrow biopsy c/w Myelodysplastic syndrome with low blasts and increase p53 staining by immunohistochemistry. Cytogenetics, FISH pending. \par \par Patient complaints of having sob with minimal exertion, denies chest pain, bleeding, fevers, night sweats or weight loss.  [de-identified] : Patient was recently diagnosed with MDS TP 53 mutation, found to have bi-cytopenia. 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 \par \par 12/16 - Started Vidaza 75mg/m2= 148mg subcutaneously qd x 7d, Venetoclax 100mg x1d, 200mg x1d, 400mg thereafter. \par Patient was  neutropenic and the dose of Venetoclax   was reduced to 100 mg daily. \par \par Denies fevers, night sweats or weight loss. Denies bleeding. \par \par No other changes in medical, surgical or social history since  4/7/23. \par \par \par \par \par \par \par

## 2023-05-26 PROBLEM — D46.9 MYELODYSPLASTIC SYNDROME: Status: ACTIVE | Noted: 2022-01-01

## 2023-05-27 NOTE — CONSULT NOTE ADULT - ASSESSMENT
73 yo M with a PMH of DM2, HTN, and HLD, with MDS   sent in from the clinic for stroke rule out.     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 11/29/22-Bone Marrow bx revealed MDS ( 3% blasts) with TP53 mutation.   - 12/16/22 - Started Vidaza 75mg/m2= 148mg subcutaneously qd x 7d, Venetoclax 100mg x1d, 200mg x1d, 400mg thereafter.   - Patient was neutropenic and the dose of Venetoclax was reduced to 100 mg daily. Aza switched to decitabine 20 mg /m2 IV days 1-5 every 28 days on 4/10/23.   - Last decitabine dose on 5/12  - He's been requiring transfusion and last transfusion appointment was on 5/27. While in clinic, he developed stroke like symptoms for which he was sent to the ER.  - Please keep Hb>7 and platelet goal>100   - Recommend giving 1U of prbc and 2 U of plt    # acute on chronic SDH  - given the new developing SDH, patient was intubated for airway protection  - appreciate neuro rec  - patient is planned for neuroSx as well  - keep plt >100

## 2023-05-27 NOTE — ED ADULT TRIAGE NOTE - CHIEF COMPLAINT QUOTE
[address] [place] side weakness  Team called before pt arrival 953 [address] [place] side weakness    Team called before pt arrival 953 [address] unset   R side weakness  / slurred  Team called before pt arrival

## 2023-05-27 NOTE — H&P ADULT - NSHPPHYSICALEXAM_GEN_ALL_CORE
MS: Fluent; Follows crossed commands; No hemineglect  CN: VFF; EOMI; Severe R. facial droop; T/u midline  Motor: RUE no drift; LUE no drift; RLE some effort against gravity falls to bed within 2 seconds; LLE no drift  Sensation: Intact to LT throughout  Coordination: No ataxia on FNF or H2S bilaterally   Gait: Unable to assess given degree of paresis

## 2023-05-27 NOTE — ED PROVIDER NOTE - PHYSICAL EXAMINATION
PHYSICAL EXAM:  GENERAL: Sitting comfortable in bed, in no acute distress,  HENMT: Atraumatic, moist mucous membranes, no oropharyngeal exudates or vesicles, uvula is midline EYES: Clear bilaterally, PERRL, EOMs intact b/l  HEART: RRR, S1/S2, no murmur/gallops/rubs  RESPIRATORY: Clear to auscultation bilaterally, no wheezes/rhonchi/rales  ABDOMEN: +BS, soft, nontender, nondistended  EXTREMITIES: No lower extremity edema, +2 radial pulses b/l  NEURO:  A&Ox4, Right-sided eyelid droop right-sided nasolabial fold droop, right-sided mouth droop.  Remainder of cranial nerves grossly intact.  No pronator drift.  Initially some right-sided  and right lower extremity weakness however on repeat examination with neurology.  Patient seems to be moving bilateral arms and legs equally.  Finger-nose grossly intact  Heme/LYMPH: No ecchymosis or bruising, no anterior/posterior cervical or supraclavicular LAD  SKIN:  Skin normal, warm, dry and intact. No evidence of rash. PHYSICAL EXAM:  GENERAL: Sitting comfortable in bed, in no acute distress,  HENMT: Atraumatic, moist mucous membranes, no oropharyngeal exudates or vesicles, uvula is midline EYES: Clear bilaterally, PERRL, EOMs intact b/l  HEART: RRR, S1/S2, no murmur/gallops/rubs  RESPIRATORY: Clear to auscultation bilaterally, no wheezes/rhonchi/rales  ABDOMEN: +BS, soft, nontender, nondistended  EXTREMITIES: No lower extremity edema, +2 radial pulses b/l  NEURO:  A&Ox4, Right-sided eyelid droop right-sided nasolabial fold droop, right-sided mouth droop.  Remainder of cranial nerves grossly intact.  No pronator drift.  Initially some right-sided  and right lower extremity weakness however on repeat examination with neurology.  Patient seems to be moving bilateral arms and legs equally.  Finger-nose grossly intact  Heme/LYMPH: No ecchymosis or bruising, no anterior/posterior cervical or supraclavicular LAD  SKIN:  Skin normal, warm, dry and intact. No evidence of rash.  Attending Yvonne Simms: Gen: awake and following command, heent :atraumatic, mmm, cv: rrr, lungs; bl breath sounds, and: sof nontender, nondistended, ext wwp, neuro: left sided weakness, right sdied facial droop.

## 2023-05-27 NOTE — H&P ADULT - ASSESSMENT
73 year old man w/ PMH of Myelodysplastic Syndrome with significant pancytopenia, HTN, HLD, DM, presents w/ acute onset R. hemiparesis.     NIHSS 4  MRS 1  CTH neg  CTA h/n neg  CTP neg    Impression: Acute onset RHP secondary to

## 2023-05-27 NOTE — ED ADULT NURSE NOTE - OBJECTIVE STATEMENT
73Y male, PMH of Myelodysplastic Syndrome with significant pancytopenia, HTN, HLD, DM pt presents to the ED w/ acute onset right facial droop and right sided weakness. Per EMS patient was at his hematology office about to receive a platelet and red blood cell transfusion for his pancytopenia when he was last seen normal around 920-9 30.  Staff then went back into the room at 953 and it was noted that he had developed acute slurred speech, stuttering, right-sided facial droop, subjective right-sided weakness in his arm and leg.  Otherwise denies any headache, blurry vision, chest pain, shortness of breath, palpitations, abdominal pain, nausea or other complaints at this time.  EMS called in the case as a code stroke and he was rapidly assessed in the ED by primary ER team and neurology. abd soft and non tender, pt diaphoretic on exam. skin warm, moist, color normal to race.

## 2023-05-27 NOTE — ED PROVIDER NOTE - NS ED ROS FT
Gen: See HPI  Eyes: No eye irritation or discharge  ENT: No ear pain, congestion, sore throat  Resp: No cough or trouble breathing  Cardiovascular: No chest pain or palpitation  Gastroenteric: No nausea/vomiting, diarrhea, constipation  :  No change in urine output; no dysuria  MS: No joint or muscle pain  Skin: No rashes  Neuro: See HPI  Remainder negative, except as per the HPI

## 2023-05-27 NOTE — H&P ADULT - HISTORY OF PRESENT ILLNESS
INCOMPLETE    73 year old man w/ PMH of Myelodysplastic Syndrome with significant pancytopenia, HTN, HLD, DM, presents w/ acute onset right facial droop and right sided weakness.     Patient woke up this morning in usual state of health. He went to [facility] to receive platelet and blood transfusions. Upon arrival, he was normal with a symmetric face. While there, at around 0920, patient was witnessed to have R. facial droop and difficulty ambulating. EMS was called. He was transferred to [facility] for these symptoms.     Code stroke called upon arrival  NIHSS 4  MRS 1  CTH neg  CTA h/n neg  CTP neg

## 2023-05-27 NOTE — CONSULT NOTE ADULT - SUBJECTIVE AND OBJECTIVE BOX
HPI:  73 yo M with a PMH of DM2, HTN, and HLD, with MDS, sent in from the clinic for stroke rule out. Patient was in the clinic today for 1unit pRBC and possible platelet.   Initial evaluation, patient walking, symmetrical facial features. however, developed  right sided drooping, right side upper extremity weakness while at the infusion center and was sent in for stroke rule out. In the ED, his CTH shows acute on chronic SDH.     CBC: wbc of 0.73, plts of 14, hemoglobin of 7.3.        PAST MEDICAL & SURGICAL HISTORY:  DM (diabetes mellitus)      HTN (hypertension)      HLD (hyperlipidemia)      Iron deficiency anemia      History of hernia repair          Allergies    No Known Allergies    Intolerances        MEDICATIONS  (STANDING):  acetaminophen   IVPB .. 1000 milliGRAM(s) IV Intermittent once  etomidate Injectable 20 milliGRAM(s) IV Push Once  LORazepam   Injectable 1 milliGRAM(s) IV Push once  mannitol 20% IVPB 70 Gram(s) IV Intermittent once  propofol Infusion 20 MICROgram(s)/kG/Min (9.36 mL/Hr) IV Continuous <Continuous>  rocuronium Injectable 80 milliGRAM(s) IV Push Once  sodium chloride 0.9% Bolus 500 milliLiter(s) IV Bolus once  sodium chloride 3% Bolus 250 milliLiter(s) IV Bolus once    MEDICATIONS  (PRN):      FAMILY HISTORY:  FH: heart disease (Father)        SOCIAL HISTORY: No EtOH, no tobacco    REVIEW OF SYSTEMS:    CONSTITUTIONAL: No weakness, fevers or chills  EYES/ENT: No visual changes;  No vertigo or throat pain   NECK: No pain or stiffness  RESPIRATORY: No cough, wheezing, hemoptysis; No shortness of breath  CARDIOVASCULAR: No chest pain or palpitations  GASTROINTESTINAL: No abdominal or epigastric pain. No nausea, vomiting, or hematemesis; No diarrhea or constipation. No melena or hematochezia.  GENITOURINARY: No dysuria, frequency or hematuria  NEUROLOGICAL: No numbness or weakness  SKIN: No itching, burning, rashes, or lesions   All other review of systems is negative unless indicated above.    Height (cm): 174 (05-27 @ 10:31)    T(F): 97.9 (05-27-23 @ 10:31), Max: 97.9 (05-27-23 @ 10:31)  HR: 97 (05-27-23 @ 10:31)  BP: 118/76 (05-27-23 @ 10:31)  RR: 18 (05-27-23 @ 10:31)  SpO2: 100% (05-27-23 @ 10:31)  Wt(kg): --    GENERAL: NAD, well-developed  HEAD:  Atraumatic, Normocephalic  EYES: EOMI, PERRLA, conjunctiva and sclera clear  NECK: Supple, No JVD  CHEST/LUNG: Clear to auscultation bilaterally; No wheeze  HEART: Regular rate and rhythm; No murmurs, rubs, or gallops  ABDOMEN: Soft, Nontender, Nondistended; Bowel sounds present  EXTREMITIES:  2+ Peripheral Pulses, No clubbing, cyanosis, or edema  NEUROLOGY: non-focal  SKIN: No rashes or lesions                          6.7    0.81  )-----------( 16       ( 27 May 2023 10:45 )             19.9       05-27    137  |  100  |  23  ----------------------------<  198<H>  4.5   |  22  |  0.97    Ca    9.5      27 May 2023 10:45    TPro  7.0  /  Alb  3.4  /  TBili  2.2<H>  /  DBili  x   /  AST  109<H>  /  ALT  40  /  AlkPhos  114  05-27          PT/INR - ( 27 May 2023 10:45 )   PT: 15.5 sec;   INR: 1.33 ratio         PTT - ( 27 May 2023 10:45 )  PTT:19.2 sec     HPI:  73 yo M with a PMH of DM2, HTN, and HLD, with MDS ( on dacogen) , sent in from the clinic for stroke rule out. Patient was in the clinic today for 1unit pRBC and platelet trasfusion. On Initial evaluation, patient walking, symmetrical facial features. however, developed  right sided drooping, right side upper extremity weakness while at the infusion center and was sent in for stroke rule out. In the ED, his CTH shows acute on chronic SDH.  CBC: wbc of 0.73, plts of 14, hemoglobin of 7.3.    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 11/29/22-Bone Marrow bx revealed MDS ( 3% blasts) with TP53 mutation.    12/16 - Started Vidaza 75mg/m2= 148mg subcutaneously qd x 7d, Venetoclax 100mg x1d, 200mg x1d, 400mg thereafter.   Patient was neutropenic and the dose of Venetoclax was reduced to 100 mg daily.   DC Azicitidine to decitabine 20 mg /m2 IV days 1-5 every 28 days. Plan to start treatment on 4/10/23.   Last decitabine dose on 5/12      PAST MEDICAL & SURGICAL HISTORY:  DM (diabetes mellitus)      HTN (hypertension)      HLD (hyperlipidemia)      Iron deficiency anemia      History of hernia repair          Allergies    No Known Allergies    Intolerances        MEDICATIONS  (STANDING):  acetaminophen   IVPB .. 1000 milliGRAM(s) IV Intermittent once  etomidate Injectable 20 milliGRAM(s) IV Push Once  LORazepam   Injectable 1 milliGRAM(s) IV Push once  mannitol 20% IVPB 70 Gram(s) IV Intermittent once  propofol Infusion 20 MICROgram(s)/kG/Min (9.36 mL/Hr) IV Continuous <Continuous>  rocuronium Injectable 80 milliGRAM(s) IV Push Once  sodium chloride 0.9% Bolus 500 milliLiter(s) IV Bolus once  sodium chloride 3% Bolus 250 milliLiter(s) IV Bolus once    MEDICATIONS  (PRN):      FAMILY HISTORY:  FH: heart disease (Father)        SOCIAL HISTORY: No EtOH, no tobacco    REVIEW OF SYSTEMS:    CONSTITUTIONAL: No weakness, fevers or chills  EYES/ENT: No visual changes;  No vertigo or throat pain   NECK: No pain or stiffness  RESPIRATORY: No cough, wheezing, hemoptysis; No shortness of breath  CARDIOVASCULAR: No chest pain or palpitations  GASTROINTESTINAL: No abdominal or epigastric pain. No nausea, vomiting, or hematemesis; No diarrhea or constipation. No melena or hematochezia.  GENITOURINARY: No dysuria, frequency or hematuria  NEUROLOGICAL: No numbness or weakness  SKIN: No itching, burning, rashes, or lesions   All other review of systems is negative unless indicated above.    Height (cm): 174 (05-27 @ 10:31)    T(F): 97.9 (05-27-23 @ 10:31), Max: 97.9 (05-27-23 @ 10:31)  HR: 97 (05-27-23 @ 10:31)  BP: 118/76 (05-27-23 @ 10:31)  RR: 18 (05-27-23 @ 10:31)  SpO2: 100% (05-27-23 @ 10:31)  Wt(kg): --    GENERAL: NAD, well-developed  HEAD:  Atraumatic, Normocephalic  EYES: EOMI, PERRLA, conjunctiva and sclera clear  NECK: Supple, No JVD  CHEST/LUNG: Clear to auscultation bilaterally; No wheeze  HEART: Regular rate and rhythm; No murmurs, rubs, or gallops  ABDOMEN: Soft, Nontender, Nondistended; Bowel sounds present  EXTREMITIES:  2+ Peripheral Pulses, No clubbing, cyanosis, or edema  NEUROLOGY: non-focal  SKIN: No rashes or lesions                          6.7    0.81  )-----------( 16       ( 27 May 2023 10:45 )             19.9       05-27    137  |  100  |  23  ----------------------------<  198<H>  4.5   |  22  |  0.97    Ca    9.5      27 May 2023 10:45    TPro  7.0  /  Alb  3.4  /  TBili  2.2<H>  /  DBili  x   /  AST  109<H>  /  ALT  40  /  AlkPhos  114  05-27          PT/INR - ( 27 May 2023 10:45 )   PT: 15.5 sec;   INR: 1.33 ratio         PTT - ( 27 May 2023 10:45 )  PTT:19.2 sec

## 2023-05-27 NOTE — CONSULT NOTE ADULT - SUBJECTIVE AND OBJECTIVE BOX
73M, PMH MDS, on active chemotherapy treatment. Presented from [facility] this AM, where he was getting blood product transfusions, with slurred speech. First CT showed acute Left SDH with 6mm MLS. 1.5hr repeat CT for Left blown pupil shows increased SDH with uncal herniation.   Exam prior to intubation: No EO, Left pupil 5NR, Right pupil 3R, RUE localizes, LUE and BLE wd. LUE twitching c/f seizure activity.

## 2023-05-27 NOTE — ED PROVIDER NOTE - CARE PLAN
Principal Discharge DX:	Subdural hemorrhage, nontraumatic  Secondary Diagnosis:	Thrombocytopenia   1

## 2023-05-27 NOTE — ED PROVIDER NOTE - ATTENDING CONTRIBUTION TO CARE
Attending MD Yvonne Simms:  I personally have seen and examined this patient.  Resident note reviewed and agree on plan of care and except where noted.  See HPI, PE, and MDM for details.

## 2023-05-27 NOTE — CONSULT NOTE ADULT - ASSESSMENT
73M, PMH MDS, on active chemotherapy treatment. Presented from [facility] this AM, where he was getting blood product transfusions, with slurred speech. First CT showed acute Left SDH with 6mm MLS. 1.5hr repeat CT for Left blown pupil shows increased SDH with uncal herniation.   Exam prior to intubation: No EO, Left pupil 5NR, Right pupil 3R, RUE localizes, LUE and BLE wd. LUE twitching c/f seizure activity.   -Adm NSCU under neurointensivist, q1h neurochecks  -Transfuse plt to goal of >75. No neurosurgical intervention until then  -Na goal 145-155

## 2023-05-27 NOTE — ED ADULT NURSE NOTE - NSFALLHARMRISKINTERV_ED_ALL_ED

## 2023-05-27 NOTE — ED PROVIDER NOTE - PROGRESS NOTE DETAILS
Attending Yvonne Simms: ct with evidence of subdural. pt denies any falls or trauma. neurosurgery consulted. pt ordered for platelets. will continue to monitor neuro status Attending Yvonne Simms: pt clinically wosening, more somnolent, diaphoretic, neurosurgery at bedside. family who is health care proxy here, pt is full code. plateltes started. will take to ct Attending Yvonne Simms: ct shows increased subdural with shift. pt receiving platelets. pt more altered, not following commands. decision made to intubate. pt ordered for mannitol and hypertonic. with need for multiple medications and difficult access emergent central line placed. pt receiving additoinal platelets

## 2023-05-27 NOTE — CONSULT NOTE ADULT - ASSESSMENT
73 year old man w/ PMH of Myelodysplastic Syndrome with significant pancytopenia, HTN, HLD, DM, presents w/ acute onset R. hemiparesis.     NIHSS 4  MRS 1  CTH neg  CTA h/n neg  CTP neg    Impression: Acute onset RHP secondary to  73 year old man w/ PMH of Myelodysplastic Syndrome with significant pancytopenia, HTN, HLD, DM, presents w/ acute onset R. hemiparesis.     Code stroke called upon arrival  NIHSS 4  MRS 1  CTH demonstrates an acute on chronic appearing L. SDH measuring 1.4cm in greatest diameter with significant L. to R. midline shift.   CTA h/n neg    Impression: Acute onset RHP secondary to L. SDH with midline shift. SDH appears to be acute on chronic in nature and likely spontaneous atraumatic SDH due to thrombocytopenia.     Plan:   [] NSG eval   [] Repeat CTH in 4 hours; Repeat CTH in 24 hours  [] Keep SBP <140   [] Hold all antiplatelet agents; Consider platelet and blood transfusions   73 year old man w/ PMH of Myelodysplastic Syndrome with significant pancytopenia, HTN, HLD, DM, presents w/ acute onset R. hemiparesis found to have L. SDH w/ midline shift.     Code stroke called upon arrival  NIHSS 4  MRS 1  CTH demonstrates an acute on chronic appearing L. SDH measuring 1.4cm in greatest diameter with significant L. to R. midline shift.   CTA h/n neg    No TNK given acute SDH and thrmobocytopenia  No MT as no LVO     Impression: Acute onset RHP secondary to L. SDH with midline shift. SDH appears to be acute on chronic in nature and likely spontaneous atraumatic SDH due to thrombocytopenia.     Plan:   [] NSG eval   [] Repeat CTH in 4 hours; Repeat CTH in 24 hours  [] Keep SBP <140   [] Hold all antiplatelet agents; Consider platelet and blood transfusions

## 2023-05-27 NOTE — ED ADULT TRIAGE NOTE - NS ED NURSE DIRECT TO ROOM YN
Anesthesia Pre Eval Note    Anesthesia ROS/Med Hx    Overall Review:  EKG was reviewed     Anesthetic Complication History:  Patient does not have a history of anesthetic complications      Pulmonary Review:    Positive for sleep apnea History of: asthma -     Cardiovascular Review:    Positive for dysrhythmias - Chronic A-fib  Positive for hypertension  Positive for hyperlipidemia    GI/HEPATIC/RENAL Review:  Patient does not have a GI/hepatic/renalhistory       End/Other Review:  Positive for diabetes  Positive for obesity class I - 30.00 - 34.99  Positive for arthritis      Relevant Problems   No relevant active problems       Physical Exam     Airway   Mallampati: II  TM Distance: >3 FB  Neck ROM: Full  Neck: Able to place in sniff position    Cardiovascular    Cardio Rhythm: Irregular  Cardio Rate: Normal    Head Assessment  Head assessment: Normocephalic    General Assessment  General Assessment: Alert and oriented    Pulmonary Exam  Pulmonary exam normal  Breath sounds clear to auscultation:  Yes    Abdominal Exam    Patient Demonstrates:  Obese      Anesthesia Plan    ASA Status: 3    Anesthesia Type: MAC    Induction: Intravenous    Checklist  Reviewed: Lab Results, Allergies, Medications, Care Everywhere, EKG, Problem list, Patient Summary, Past Med History and NPO Status    Informed Consent  The proposed anesthetic plan, including its risks and benefits, have been discussed with the Patient  - along with the risks and benefits of alternatives.  Questions were encouraged and answered and the patient and/or representative understands and agrees to proceed.     Blood Products: Not Anticipated     Yes

## 2023-05-27 NOTE — CONSULT NOTE ADULT - TIME BILLING
coordinating care
Preparation to see the patient, including reviewing the brain imaging in past one year, obtaining and/or reviewing the resident/ or PA note, separately obtained history, performing a medically appropriate examination and/or evaluation as documented in this encounter note, counseling and educating of the patient, ordering tests, documenting clinical information in patients electronic record , interpreting results (not separately reported) and communicating results to the patient.   Evaluation was performed on 5/28/23    Dysarthria/ Rt hemiplegia  Large left SDH w midline shift   Clinically stable   Severe Thrombocytopenia   NSG on board  RECS;  Platelet transfusion   Follow NSG recommendations  Repeat CTH

## 2023-05-27 NOTE — PROGRESS NOTE ADULT - SUBJECTIVE AND OBJECTIVE BOX
HPI: 73 year old man w/ PMH of Myelodysplastic Syndrome with significant pancytopenia, HTN, HLD, DM, presents w/ acute onset right facial droop and right sided weakness. Per EMS patient was at his hematology office about to receive a platelet and red blood cell transfusion for his pancytopenia when he was last seen normal around 920-9 30.  Staff then went back into the room at 953 and it was noted that he had developed acute slurred speech, stuttering, right-sided facial droop, subjective right-sided weakness in his arm and leg.  Otherwise denies any headache, blurry vision, chest pain, shortness of breath, palpitations, abdominal pain, nausea or other complaints at this time.  EMS called in the case as a code stroke and he was rapidly assessed in the ED by primary ER team and neurology.      On admission, the patient was:  GCS: 10T    24H events: Pt received 3U plts and 1 PRBC, febrile, cultured, in the evening I had discussion w family 2 daughters and 2 cousins at bedside after they had discussion w nsgy team. Pt made comfort measures only    VITALS/LABS/IMAGING:    reviewed.    MEDICATIONS:  reviewed    EXAMINATION: on sedation  General:  calm  HEENT:  MMM  Neuro:  eyes closed, does not follow commands, extends RUE, triple flex RLE, LLE triple flex, LUE withdraws, overbreathing the vent, cough/gag+  Cards:  RRR  Respiratory:  intubated  Adomen:  soft  Extremities:  no edema  Skin:  warm/dry

## 2023-05-27 NOTE — PROGRESS NOTE ADULT - SUBJECTIVE AND OBJECTIVE BOX
HPI: 73 year old man w/ PMH of Myelodysplastic Syndrome with significant pancytopenia, HTN, HLD, DM, presents w/ acute onset right facial droop and right sided weakness. Per EMS patient was at his hematology office about to receive a platelet and red blood cell transfusion for his pancytopenia when he was last seen normal around 920-9 30.  Staff then went back into the room at 953 and it was noted that he had developed acute slurred speech, stuttering, right-sided facial droop, subjective right-sided weakness in his arm and leg.  Otherwise denies any headache, blurry vision, chest pain, shortness of breath, palpitations, abdominal pain, nausea or other complaints at this time.  EMS called in the case as a code stroke and he was rapidly assessed in the ED by primary ER team and neurology.      On admission, the patient was:  GCS: 10T    *** HIGH RISK OF DVT PRESENT ON ADMISSION ***    VITALS:  T(C): , Max: 36.6 (05-27-23 @ 10:31)  HR:  (80 - 97)  BP:  (118/76 - 166/91)  ABP: --  RR:  (18 - 22)  SpO2:  (100% - 100%)  Wt(kg): --  Device: Avea, Mode: AC/ CMV (Assist Control/ Continuous Mandatory Ventilation), RR (machine): 20, TV (machine): 450, FiO2: 50, PEEP: 5, ITime: 1, MAP: 11, PIP: 24    LABS:  Na: 139 (05-27 @ 13:05), 137 (05-27 @ 10:45)  K: 3.5 (05-27 @ 13:05), 4.5 (05-27 @ 10:45)  Cl: 101 (05-27 @ 13:05), 100 (05-27 @ 10:45)  CO2: 21 (05-27 @ 13:05), 22 (05-27 @ 10:45)  BUN: 22 (05-27 @ 13:05), 23 (05-27 @ 10:45)  Cr: 0.86 (05-27 @ 13:05), 0.97 (05-27 @ 10:45)  Glu: 242(05-27 @ 13:05), 198(05-27 @ 10:45)    Hgb: 6.8 (05-27 @ 16:23), 5.6 (05-27 @ 13:05), 6.7 (05-27 @ 10:45), 7.3 (05-27 @ 08:45), 7.7 (05-26 @ 14:37)  Hct: 19.7 (05-27 @ 16:23), 16.5 (05-27 @ 13:05), 19.9 (05-27 @ 10:45), 20.7 (05-27 @ 08:45), 21.5 (05-26 @ 14:37)  WBC: 1.24 (05-27 @ 16:23), 0.57 (05-27 @ 13:05), 0.81 (05-27 @ 10:45), 0.73 (05-27 @ 08:45), 0.70 (05-26 @ 14:37)  Plt: 60 (05-27 @ 16:23), 39 (05-27 @ 13:05), 16 (05-27 @ 10:45), 14 (05-27 @ 08:45), 14 (05-26 @ 14:37)  PT: 15.5 (05-27 @ 10:45)  INR: 1.33 (05-27 @ 10:45)  aPTT: 19.2 (05-27 @ 10:45)    IMAGING:   Recent imaging studies were reviewed.    MEDICATIONS:  acetaminophen   IVPB .. 1000 milliGRAM(s) IV Intermittent once  chlorhexidine 0.12% Liquid 15 milliLiter(s) Oral Mucosa two times a day  insulin lispro (ADMELOG) corrective regimen sliding scale   SubCutaneous every 6 hours  lacosamide IVPB 100 milliGRAM(s) IV Intermittent every 12 hours  pantoprazole  Injectable 40 milliGRAM(s) IV Push once  propofol Infusion 20 MICROgram(s)/kG/Min IV Continuous <Continuous>  sodium chloride 0.9% Bolus 500 milliLiter(s) IV Bolus once    EXAMINATION:  General:  calm  HEENT:  MMM  Neuro:  eyes open, does not follow commands, extends RUE, triple flex RLE, LLE triple flex, LUE withdraws  Cards:  RRR  Respiratory:  intubated  Adomen:  soft  Extremities:  no edema  Skin:  warm/dry

## 2023-05-27 NOTE — ED PROVIDER NOTE - CLINICAL SUMMARY MEDICAL DECISION MAKING FREE TEXT BOX
Akbar Whitley MD (PGY-3): 73 year old man w/ PMH of Myelodysplastic Syndrome with significant pancytopenia, HTN, HLD, DM, presents w/ acute onset right facial droop and right sided weakness.  Presents as code stroke with concern for possible head bleed in the setting of thrombocytopenia Akbar Whitley MD (PGY-3): 73 year old man w/ PMH of Myelodysplastic Syndrome with significant pancytopenia, HTN, HLD, DM, presents w/ acute onset right facial droop and right sided weakness.  Presents as code stroke with concern for possible head bleed in the setting of thrombocytopenia  Attending Yvonne Simms: 73-year-old male with complex past medical history including history of myelodysplastic syndrome followed by [facility] with history of subsequent significant pancytopenia presenting with concern for right facial droop and right-sided weakness.  Code stroke was called upon arrival and patient was taken to CT.  CT showed evidence of a subdural hematoma.  On recent blood work patient with significant thrombocytopenia.  Patient denies any falls or any trauma.  Does report some bleeding of his gums recently.  Neurosurgery consulted immediately.  Patient moving all his extremities with some mild weakness on his left side.  Patient ordered for platelets given that bleed could be spontaneous due to significant thrombocytopenia and no report or evidence of any trauma.  On repeat exam patient found to be more somnolent with a significant change compared to upon arrival.  Patient taken back to CT scan showing worsening of his subdural hematoma now with shift.  Neurosurgery at baseline and evaluating the patient.  Discussed with family and at this time patient was full code and decision made to intubate the patient.  As concerned that patient will require a lot of medications including mannitol as well as hypertonic saline for worsening intracranial hemorrhage and patient with limited access decision made to place emergent central access to the right femoral vein.  Patient given additional unit of platelets.  Additionally while in the ER patient did have some tremors of his upper extremities concerning for seizures as well as patient was diaphoretic.  Patient was loaded on Keppra.  Ativan initially held due to concern for worsening mental status and need for intubation.  Discussed with neurosurgery after repeat CBC showed some improvement however not enough for emergent surgery at this time and will continue to monitor pending repeat platelets.  Patient is not on any other anticoagulation.  Patient will need admission to the ICU.  Patient is critically ill.

## 2023-05-27 NOTE — CONSULT NOTE ADULT - SUBJECTIVE AND OBJECTIVE BOX
73 year old man w/ PMH of Myelodysplastic Syndrome with significant pancytopenia, HTN, HLD, DM, presents w/ acute onset right facial droop and right sided weakness.     Patient woke up this morning in usual state of health. He went to [facility] to receive platelet and blood transfusions. Upon arrival, he was normal with a symmetric face. While there, at around 0920, patient was witnessed to have R. facial droop and difficulty ambulating. EMS was called. He was transferred to [facility] for these symptoms.     Code stroke called upon arrival  NIHSS 4  MRS 1  CTH neg  CTA h/n neg  CTP neg     MS: Fluent; Follows crossed commands; No hemineglect  CN: VFF; EOMI; Severe R. facial droop; T/u midline  Motor: RUE no drift; LUE no drift; RLE some effort against gravity falls to bed within 2 seconds; LLE no drift  Sensation: Intact to LT throughout  Coordination: No ataxia on FNF or H2S bilaterally   Gait: Unable to assess given degree of paresis ARNALDO RUIZ  Male  [id number]    HPI:    73 year old man w/ PMH of Myelodysplastic Syndrome with significant pancytopenia, HTN, HLD, DM, presents w/ acute onset right facial droop and right sided weakness.     Patient woke up this morning in usual state of health. He went to [facility] to receive platelet and blood transfusions. Upon arrival, he was normal with a symmetric face. While there, at around 0920, patient was witnessed to have R. facial droop and difficulty ambulating. EMS was called. He was transferred to [facility] for these symptoms.     Code stroke called upon arrival  NIHSS 4  MRS 1  CTH demonstrates an acute on chronic appearing L. SDH measuring 1.4cm in greatest diameter with significant L. to R. midline shift.   CTA h/n neg    ROS: All negative except as mentioned in HPI    PAST MEDICAL & SURGICAL HISTORY:  DM (diabetes mellitus)      HTN (hypertension)      HLD (hyperlipidemia)      Iron deficiency anemia      History of hernia repair        MEDICATIONS  (STANDING):  levETIRAcetam  IVPB 2000 milliGRAM(s) IV Intermittent once  ondansetron Injectable 4 milliGRAM(s) IV Push once    MEDICATIONS  (PRN):    Allergies    No Known Allergies    Intolerances        VITAL SIGNS:  T(F): 97.9  HR: 97  BP: 118/76  RR: 18  SpO2: 100%     MS: Fluent; Follows crossed commands; No hemineglect  CN: VFF; EOMI; Severe R. facial droop; T/u midline  Motor: RUE no drift; LUE no drift; RLE some effort against gravity falls to bed within 2 seconds; LLE no drift  Sensation: Intact to LT throughout  Coordination: No ataxia on FNF or H2S bilaterally   Gait: Unable to assess given degree of paresis    LABS:                          7.3    0.73  )-----------( 14       ( 27 May 2023 08:45 )             20.7               RADIOLOGY & ADDITIONAL STUDIES:                     ARNALDO RUIZ  Male  MRN-[id number]    HPI:    73 year old man w/ PMH of Myelodysplastic Syndrome with significant pancytopenia, HTN, HLD, DM, presents w/ acute onset right facial droop and right sided weakness.     Patient woke up this morning in usual state of health. He went to [facility] to receive platelet and blood transfusions. Upon arrival, he was normal with a symmetric face. While there, at around 0920, patient was witnessed to have R. facial droop and difficulty ambulating. EMS was called. He was transferred to [facility] for these symptoms.     Code stroke called upon arrival  NIHSS 4  MRS 1  CTH demonstrates an acute on chronic appearing L. SDH measuring 1.4cm in greatest diameter with significant L. to R. midline shift.   CTA h/n neg    No TNK given acute SDH and thrmobocytopenia  No MT as no LVO     ROS: All negative except as mentioned in HPI    PAST MEDICAL & SURGICAL HISTORY:  DM (diabetes mellitus)      HTN (hypertension)      HLD (hyperlipidemia)      Iron deficiency anemia      History of hernia repair        MEDICATIONS  (STANDING):  levETIRAcetam  IVPB 2000 milliGRAM(s) IV Intermittent once  ondansetron Injectable 4 milliGRAM(s) IV Push once    MEDICATIONS  (PRN):    Allergies    No Known Allergies    Intolerances        VITAL SIGNS:  T(F): 97.9  HR: 97  BP: 118/76  RR: 18  SpO2: 100%     MS: Fluent; Follows crossed commands; No hemineglect  CN: VFF; EOMI; Severe R. facial droop; T/u midline  Motor: RUE no drift; LUE no drift; RLE some effort against gravity falls to bed within 2 seconds; LLE no drift  Sensation: Intact to LT throughout  Coordination: No ataxia on FNF or H2S bilaterally   Gait: Unable to assess given degree of paresis    LABS:                          7.3    0.73  )-----------( 14       ( 27 May 2023 08:45 )             20.7               RADIOLOGY & ADDITIONAL STUDIES:

## 2023-05-27 NOTE — CHART NOTE - NSCHARTNOTEFT_GEN_A_CORE
CAPRINI SCORE [CLOT] Score on Admission for     AGE RELATED RISK FACTORS                                                       MOBILITY RELATED FACTORS  [ ] Age 41-60 years                                            (1 Point)                  [ ] Bed rest                                                        (1 Point)  [x ] Age: 61-74 years                                           (2 Points)                 [ ] Plaster cast                                                   (2 Points)  [ ] Age= 75 years                                              (3 Points)                 [ ] Bed bound for more than 72 hours                 (2 Points)    DISEASE RELATED RISK FACTORS                                               GENDER SPECIFIC FACTORS  [ ] Edema in the lower extremities                       (1 Point)                  [ ] Pregnancy                                                     (1 Point)  [ ] Varicose veins                                               (1 Point)                  [ ] Post-partum < 6 weeks                                   (1 Point)             [x ] BMI > 25 Kg/m2                                            (1 Point)                  [ ] Hormonal therapy  or oral contraception          (1 Point)                 [ ] Sepsis (in the previous month)                        (1 Point)                  [ ] History of pregnancy complications                 (1 point)  [ ] Pneumonia or serious lung disease                                               [ ] Unexplained or recurrent                     (1 Point)           (in the previous month)                               (1 Point)  [ ] Abnormal pulmonary function test                     (1 Point)                 SURGERY RELATED RISK FACTORS (include planned surgeries)  [ ] Acute myocardial infarction                              (1 Point)                 [ ]  Section                                             (1 Point)  [ ] Congestive heart failure (in the previous month)  (1 Point)         [ ] Minor surgery                                                  (1 Point)   [ ] Inflammatory bowel disease                             (1 Point)                 [ ] Arthroscopic surgery                                        (2 Points)  [ ] Central venous access                                      (2 Points)                [ ] General surgery lasting more than 45 minutes   (2 Points)       [ ] Stroke (in the previous month)                          (5 Points)               [ ] Elective arthroplasty                                         (5 Points)            [ ] current or past malignancy                              (2 Points)                                                                                                       HEMATOLOGY RELATED FACTORS                                                 TRAUMA RELATED RISK FACTORS  [ ] Prior episodes of VTE                                     (3 Points)                [ ] Fracture of the hip, pelvis, or leg                       (5 Points)  [ ] Positive family history for VTE                         (3 Points)                 [ ] Acute spinal cord injury (in the previous month)  (5 Points)  [ ] Prothrombin 34195 A                                     (3 Points)                 [ ] Paralysis  (less than 1 month)                             (5 Points)  [ ] Factor V Leiden                                             (3 Points)                  [ ] Multiple Trauma within 1 month                        (5 Points)  [ ] Lupus anticoagulants                                     (3 Points)                                                           [ ] Anticardiolipin antibodies                               (3 Points)                                                       [ ] High homocysteine in the blood                      (3 Points)                                             [ ] Other congenital or acquired thrombophilia      (3 Points)                                                [ ] Heparin induced thrombocytopenia                  (3 Points)                                          Total Score [      3    ]    Risk:  Very low 0   Low 1 to 2   Moderate 3 to 4   High =5       VTE Prophylasix Recommednations:  [x ] mechanical pneumatic compression devices                                      [ ] contraindicated: _____________________  [ ] chemo prophylasix                                                                                   [ ] contraindicated _____________________    **** HIGH LIKELIHOOD DVT PRESENT ON ADMISSION  [ ] (please order LE dopplers within 24 hours of admission)

## 2023-05-27 NOTE — CONSULT NOTE ADULT - ATTENDING COMMENTS
73 yo M with a PMH of DM2, HTN, and HLD, with MDS   sent in from the clinic for stroke rule out.     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 11/29/22-Bone Marrow bx revealed MDS ( 3% blasts) with TP53 mutation.   - 12/16/22 - Started Vidaza 75mg/m2= 148mg subcutaneously qd x 7d, Venetoclax 100mg x1d, 200mg x1d, 400mg thereafter.   - Patient was neutropenic and the dose of Venetoclax was reduced to 100 mg daily. Aza switched to decitabine 20 mg /m2 IV days 1-5 every 28 days on 4/10/23.   - Last decitabine dose on 5/12  - He's been requiring transfusion and last transfusion appointment was on 5/27. While in clinic, he developed stroke like symptoms for which he was sent to the ER.  - Please keep Hb>7 and platelet goal>100   - Recommend giving 1U of prbc and 2 U of plt STAT.     # acute on chronic SDH  - given the new developing SDH, patient was intubated for airway protection  - patient is planned for neuroSx as well  - keep plt >100

## 2023-05-28 NOTE — CONSULT NOTE ADULT - PROBLEM SELECTOR RECOMMENDATION 9
CTH: Interval worsening with enlargement of the left holohemispheric subdural hematoma with increasing subfalcian and uncal herniation. Interval enlargement of the right lateral ventricle which is   obstructed from the herniation.   > Appreciate neurosurgical recs   > Not a candidate for surgical intervention   > Goal is for comfort. Plan for compassionate extubation tonight when family arrives.

## 2023-05-28 NOTE — CONSULT NOTE ADULT - PROBLEM SELECTOR RECOMMENDATION 7
DNR, plan for compassionate extubation tonight (~11:30pm) once family arrives   Discussed with patient's family, Beckie (niece), Belkis (niece) and attempted to speak to daughter Angie (but she did not  when we called). Family is in communication with patient's spouse in [facility] who is aware of patient's current clinical condition and are all in agreement with compassionate extubation tonight and transition to comfort care. Discussed PCU transition when bed is available but conditional if patient is stable after late night extubation and once cleared by [facility] [place].

## 2023-05-28 NOTE — CONSULT NOTE ADULT - ASSESSMENT
73M, PMH MDS, on active chemotherapy treatment. Presented from [facility] this AM, where he was getting blood product transfusions, with slurred speech. First CT showed acute Left SDH with 6mm MLS. 1.5hr repeat CT for Left blown pupil shows increased SDH with uncal herniation. Patient not a candidate for Neurosurgical intervention. Palliative consulted for complex decision making regarding goc in setting of devastating SDH.

## 2023-05-28 NOTE — CONSULT NOTE ADULT - SUBJECTIVE AND OBJECTIVE BOX
[facility]  Ayana Lopez Palliative Care Attending  Contact Info: Page [phone] (including Nights/Weekends), message on Microsoft Teams (Ayana Lopez), or leave VM at Palliative Office [phone] (non-urgent)     Date of [id number] @ 13:02  HPI: 73 year old man w/ PMH of Myelodysplastic Syndrome with significant pancytopenia, HTN, HLD, DM, presents w/ acute onset right facial droop and right sided weakness. Per EMS patient was at his hematology office about to receive a platelet and red blood cell transfusion for his pancytopenia when he was last seen normal around 920-9 30.  Staff then went back into the room at 953 and it was noted that he had developed acute slurred speech, stuttering, right-sided facial droop, subjective right-sided weakness in his arm and leg.  Otherwise denies any headache, blurry vision, chest pain, shortness of breath, palpitations, abdominal pain, nausea or other complaints at this time.  EMS called in the case as a code stroke and he was rapidly assessed in the ED by primary ER team and neurology.    PERTINENT PM/SXH:   DM (diabetes mellitus)  HTN (hypertension)  HLD (hyperlipidemia)  Iron deficiency anemia    No significant past surgical history  History of hernia repair    FAMILY HISTORY:  FH: heart disease (Father)    Family Hx substance abuse [ ]yes [ ]no  ITEMS NOT CHECKED ARE NOT PRESENT    SOCIAL HISTORY:   Significant other/partner[ ]  Children[ ]  Taoist/Spirituality:  Substance hx:  [ ]   Tobacco hx:  [ ]   Alcohol hx: [ ]   Home Opioid hx:  [ ] I-Stop Reference No: This report was requested by: Ayana Lopez | Reference #: [id number]    Practitioner Count: 1  Pharmacy Count: 1  Current Opioid Prescriptions: 0  Current Benzodiazepine Prescriptions: 0  Current Stimulant Prescriptions: 0      Patient Demographic Information (PDI)       PDI	First Name	Last Name	Birth Date	Gender	Street Address	City	State	Zip Code  A	Jose Calderón	1950	Male	[address] [facility]	[id number]    Prescription Information      PDI Filter:    PDI	My Rx	Current Rx	Drug Type	Rx Written	Rx Dispensed	Drug	Quantity	Days Supply	Prescriber Name	Prescriber MARTIN #  A	N	Y		2023	dronabinol 2.5 mg capsule	60	30	Mago Damon	[id number]  Payment Method Insurance  Dispenser [facility]  A	N	N		03/10/2023	2023	dronabinol 2.5 mg capsule	60	30	Mago Damon	[id number]  Payment Method Insurance  [facility]  Living Situation: [ ]Home  [ ]Long term care  [ ]Rehab [ ]Other    ADVANCE DIRECTIVES:    DNR/MOLST  [ ]  Living Will  [ ]   DECISION MAKER(s): Daughter, Angie, signed MOLST form but patient with multiple family members involved- Belkis (niece) and Beckie (niece and the person who brought him to all of his appointments). All family members communicate with each other.   [ ] Health Care Proxy(s)  [ ] Surrogate(s)  [ ] Guardian           Name(s): Beckie (niece)  Phone Number(s): [phone]    BASELINE (I)ADL(s) (prior to admission): Pt was independent prior to admission   [place]: [ ]Total  [ ] Moderate [ x]Dependent    Allergies    No Known Allergies    Intolerances    MEDICATIONS  (STANDING):  chlorhexidine 0.12% Liquid 15 milliLiter(s) Oral Mucosa two times a day  HYDROmorphone Infusion 1 mG/Hr (1 mL/Hr) IV Continuous <Continuous>  lacosamide IVPB 100 milliGRAM(s) IV Intermittent every 12 hours    MEDICATIONS  (PRN):  artificial  tears Solution 2 Drop(s) Both EYES every 1 hour PRN Dry Eyes  glycopyrrolate Injectable 0.4 milliGRAM(s) IV Push four times a day PRN Secretions  HYDROmorphone  Injectable 1 milliGRAM(s) IV Push every 2 hours PRN Severe Pain (7 - 10)  LORazepam   Injectable 1 milliGRAM(s) IV Push every 4 hours PRN Anxiety  ondansetron Injectable 4 milliGRAM(s) IV Push every 6 hours PRN Nausea and/or Vomiting    PRESENT SYMPTOMS: [ ]Unable to self-report  [ ] CPOT [x ] PAINADs [x ] RDOS  Source if other than patient:  [ ]Family   [x ]Team     Pain: [ ]yes [ ]no  QOL impact -   Location -                    Aggravating factors -  Quality -  Radiation -  Timing-  Severity (0-10 scale):  Minimal acceptable level/pain goal (0-10 scale):     CPOT:    https://www.sccm.org/getattachment/mov77l05-7y4h-4i4n-3w9a-3802a1925i7j/Critical-Care-Pain-Observation-Tool-(CPOT)    Dyspnea:                           [ ]Mild [ ]Moderate [ ]Severe  Anxiety:                             [ ]Mild [ ]Moderate [ ]Severe  Fatigue:                             [ ]Mild [ ]Moderate [ ]Severe  Nausea:                             [ ]Mild [ ]Moderate [ ]Severe  Loss of appetite:              [ ]Mild [ ]Moderate [ ]Severe  Constipation:                    [ ]Mild [ ]Moderate [ ]Severe    Other Symptoms:  [ ]All other review of systems negative     PCSSQ[Palliative Care Spiritual Screening Question]   Severity (0-10):  Chaplaincy Referral: [ x] yes [ ] refused [ ] following [ ] deferred     Caregiver Bob White? : [ ] yes [x ] no [ ] Deferred [ ] Declined             Social work referral [ ] Patient & Family Centered Care Referral [ ]  Anticipatory Grief present?:  [ ] yes [x ] no  [ ] Deferred                  Social work referral [ ] Patient & Family Centered Care Referral [ ]    PHYSICAL EXAM:  Vital Signs Last 24 Hrs  T(C): 37.5 (28 May 2023 07:00), Max: 39.4 (27 May 2023 18:30)  T(F): 99.5 (28 May 2023 07:00), Max: 103 (27 May 2023 19:00)  HR: 98 (28 May 2023 09:54) (80 - 123)  BP: 100/57 (28 May 2023 07:00) (100/57 - 220/196)  BP(mean): 71 (28 May 2023 07:00) (71 - 203)  RR: 20 (28 May 2023 07:00) (19 - 31)  SpO2: 100% (28 May 2023 09:54) (100% - 100%)    Parameters below as of 28 May 2023 07:00  Patient On (Oxygen Delivery Method): ventilator     I&O's Summary    27 May 2023 07:01  -  28 May 2023 07:00  --------------------------------------------------------  IN: 1411.1 mL / OUT: 1290 mL / NET: 121.1 mL      GENERAL: [ ]Cachexia    [ ]Alert  [ ]Oriented x   [ ]Lethargic  [ x]Unarousable  [ ]Verbal  [ ]Non-Verbal  Behavioral:   [ ] Anxiety  [ ] Delirium [ ] Agitation [ x] Other  HEENT:  [ ]Normal   [ ]Dry mouth   [x ]ET Tube/Trach  [ ]Oral lesions  PULMONARY:   [ x]Clear [ ]Tachypnea  [ ]Audible excessive secretions   [ ]Rhonchi        [ ]Right [ ]Left [ ]Bilateral  [ ]Crackles        [ ]Right [ ]Left [ ]Bilateral  [ ]Wheezing     [ ]Right [ ]Left [ ]Bilateral  [ ]Diminished breath sounds [ ]right [ ]left [ ]bilateral  CARDIOVASCULAR:    [x ]Regular [ ]Irregular [ ]Tachy  [ ]Fuentes [ ]Murmur [ ]Other  GASTROINTESTINAL:  [ x]Soft  [ ]Distended   [ ]+BS  [ x]Non tender [ ]Tender  [ ]Other [ ]PEG [ ]OGT/ NGT  Last BM: ?  GENITOURINARY:  [ ]Normal [ ] Incontinent   [ ]Oliguria/Anuria   [x ]Berger  MUSCULOSKELETAL:   [ ]Normal   [ ]Weakness  [ x]Bed/Wheelchair bound [ ]Edema  NEUROLOGIC:   [ ]No focal deficits  [ ]Cognitive impairment  [ ]Dysphagia [ ]Dysarthria [ ]Paresis [x ]Other   SKIN: Please see flowsheets   [x ]Normal  [ ]Rash  [ ]Other  [ ]Pressure ulcer(s)       Present on admission [ ]y [ ]n    CRITICAL CARE:  [ ] Shock Present  [ ]Septic [ ]Cardiogenic [ ]Neurologic [ ]Hypovolemic  [ ]  Vasopressors [ ]  Inotropes   [ ]Respiratory failure present [x ]Mechanical ventilation [ ]Non-invasive ventilatory support [ ]High flow  Mode: AC/ CMV (Assist Control/ Continuous Mandatory Ventilation), RR (machine): 20, TV (machine): 450, FiO2: 50, PEEP: 5, ITime: 1, MAP: 10, PIP: 20  [ ]Acute  [ ]Chronic [ ]Hypoxic  [ ]Hypercarbic [ ]Other  [ ]Other organ failure     LABS:                        6.8    1.24  )-----------( 60       ( 27 May 2023 16:23 )             19.7   05    139  |  101  |  22  ----------------------------<  242<H>  3.5   |  21<L>  |  0.86    Ca    8.7      27 May 2023 13:05    TPro  6.3  /  Alb  3.2<L>  /  TBili  2.0<H>  /  DBili  x   /  AST  141<H>  /  ALT  41  /  AlkPhos  105    PT/INR - ( 27 May 2023 10:45 )   PT: 15.5 sec;   INR: 1.33 ratio         PTT - ( 27 May 2023 10:45 )  PTT:19.2 sec    Urinalysis Basic - ( 27 May 2023 18:15 )    Color: Yellow / Appearance: Clear / S.035 / pH: x  Gluc: x / Ketone: Negative  / Bili: Negative / Urobili: 2 mg/dL   Blood: x / Protein: Trace / Nitrite: Negative   Leuk Esterase: Negative / RBC: 88 /hpf / WBC 2 /HPF   Sq Epi: x / Non Sq Epi: x / Bacteria: Negative      RADIOLOGY & ADDITIONAL STUDIES: < from: CT Head No Cont (23 @ 17:31) >  1)  interval worsening with enlargement of the left holohemispheric   subdural hematoma with increasing subfalcian and uncal herniation.  2)  interval enlargement of the right lateral ventricle which is   obstructed from the herniation.    < end of copied text >      PROTEIN CALORIE MALNUTRITION PRESENT: [ ]mild [ ]moderate [ ]severe [ ]underweight [ ]morbid obesity  https://www.andeal.org/vault/2440/web/files/ONC/Table_Clinical%20Characteristics%20to%20Document%20Malnutrition-White%20JV%20et%20al%2020.pdf    Height (cm): 174 (23 @ 10:31), 173.99 (23 @ 15:00), 173 (22 @ 10:12)  Weight (kg): 69 (23 @ 17:30), 68.53084642423247 (23 @ 15:00), 82.3 (22 @ 10:12)  BMI (kg/m2): 22.8 (23 @ 17:30), 22.6 (23 @ 10:31), 22.6 (23 @ 15:00)    [ x]PPSV2 < or = to 30% [ ]significant weight loss  [ ]poor nutritional intake  [ ]anasarca[ ]Artificial Nutrition      Other REFERRALS:  [ ]Hospice  [ ]Child Life  [ ]Social Work  [ ]Case management [ ]Holistic Therapy

## 2023-05-28 NOTE — CONSULT NOTE ADULT - PROBLEM SELECTOR RECOMMENDATION 2
- Outpatient hematologist: Dr. 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

## 2023-05-28 NOTE — CONSULT NOTE ADULT - CONVERSATION/DISCUSSION
RN triage   Call from pt mom  Mom states pt was seen Wed -- and growing /doing well -- was given OK to try pacifier  Started using pacifier -- pt likes pacifier and now having trouble latching on and eating -- mom thinks pt has confusion at breast -- pt gets frustrated - and fussy -- but not inconsolable -- pt is hungry   Pt just finished breast fdg now = well - after confusion and fussy   U.O.gd - stooling gd --   Pt is alert when awake -- sleeping OK   T = 98.4 rectal --   Not looking or acting sick   Mom has stopped using pacifier now  Requesting suggestions from PCP -- anything else she can do ?  Please advise   Also gave lactation number as a resource   Felicia Kay RN [facility] RN triage      
Spoke to mom about nipple confusion.  Reviewed importance skin to skin and hand expression.  Try other positions at the breast and offer breast when infant cues reflect, do not wait until infant is crying- a late stage of hunger .  Discussed possibility of growth spurt .   Reviewed urine and stool expectations   
Diagnosis/Treatment Options

## 2023-05-28 NOTE — PROGRESS NOTE ADULT - ASSESSMENT
Summary: 72 yo male with MDS and pancytopenia now with spontaneous SDH in the setting of critical pancytopenia    NEURO:  q1h neuro checks; repeat CT brain with worsening shift/herniation; keppra for sz prophy    CARDS:  -160    PULM:  sat > 92%    RENAL:  NS @ 75    GASTRO:  NPO  ---> Stress ulcer prophylaxis:  PPI    HEME:  house heme following; transfuse PRBCs, platelets; target plt count >75 for possible operative intervention  ---> DVT prophylaxis: SCDs, hold anticoagulation, fresh heme    ENDO:  euglycemia    ID:  afebrile    Code status:  Full code  Disposition:  ICU    This patient was at high risk of neurologic deterioration and/or death due to: SDH    ATTENDING STATEMENT:  Patient is critically ill, requiring critical care services.     Attending: I have personally and independently provided 75 minutes of critical care services.  This excludes any time spent on separate procedures or teaching.
Summary: 72 yo male with MDS and pancytopenia now with spontaneous SDH in the setting of critical pancytopenia. Not surgical candidate after NSGY evaluation    Pt made comfort measures, DNR after discussion with family, pt's wife is in [facility] and was updated by family and agreeing with plan    NEURO:  q12h checks; repeat CT brain with worsening shift/herniation; vimpat  [facility] to be notified  start comfort measures, dilaudid gtt, once that is started will dc propofol  per family discussion will possibly extubate in am, will consult palliative    CARDS:  vitals q12h    PULM:  keep intubated overnight    RENAL:  IVL    GASTRO:  NPO, no ngt    ENDO:  euglycemia    ID:  afebrile    Code status:  comfort measures, DNR  Disposition:  ICU/PCU  
Summary: 72 yo male with MDS and pancytopenia now with spontaneous SDH in the setting of critical pancytopenia. Not surgical candidate after NSGY evaluation    Pt made comfort measures, DNR after discussion with family, pt's wife is in [facility] and was updated by family and agreeing with plan    NEURO:  q12h checks; repeat CT brain with worsening shift/herniation; vimpat  [facility] following  comfort measures, dilaudid gtt  per family discussion will possibly extubate today, will consult palliative    CARDS:  vitals q12h    PULM:  keep intubated until family ready for palliative extubation    RENAL:  IVL    GASTRO:  NPO, no ngt    ENDO:  euglycemia    ID:  afebrile    Code status:  comfort measures, DNR  Disposition:  ICU/PCU

## 2023-05-28 NOTE — PATIENT PROFILE ADULT - CAREGIVER NAME
Subjective:      Patient ID: Alisha Patino is a 12 y.o. male. Headache   This is a new problem. The current episode started 1 to 4 weeks ago (1 week). The problem occurs daily. The pain is located in the frontal region. The pain does not radiate. The quality of the pain is described as stabbing. The pain is at a severity of 0/10. Associated symptoms include blurred vision, dizziness and a loss of balance. Pertinent negatives include no abdominal pain, ear pain, eye pain, fever, nausea, phonophobia, photophobia, rhinorrhea, scalp tenderness, sinus pressure, sore throat, swollen glands, tingling, tinnitus, vomiting or weakness. Associated symptoms comments: He is here today with his mother. He states that with moving his head is when it happens. Exacerbated by: comuputers, TV. Treatments tried: Excedrin. His past medical history is significant for migraines in the family. Review of Systems   Constitutional: Negative. Negative for activity change, appetite change, fever and unexpected weight change. HENT: Negative. Negative for congestion, ear pain, postnasal drip, rhinorrhea, sinus pressure, sore throat and tinnitus. Eyes: Positive for blurred vision. Negative for photophobia, pain, discharge, itching and visual disturbance. Respiratory: Negative. Negative for chest tightness, shortness of breath and wheezing. Cardiovascular: Negative. Negative for chest pain and palpitations. Gastrointestinal: Negative. Negative for abdominal pain, constipation, diarrhea, nausea and vomiting. Endocrine: Negative. Negative for cold intolerance, heat intolerance, polydipsia, polyphagia and polyuria. Genitourinary: Negative. Negative for dysuria, frequency, hematuria and urgency. Musculoskeletal: Negative. Negative for gait problem and myalgias. Skin: Negative for pallor and rash. Allergic/Immunologic: Negative. Neurological: Positive for dizziness, headaches and loss of balance.  Negative for tingling, syncope, weakness and light-headedness. Hematological: Negative. Negative for adenopathy. Does not bruise/bleed easily. Psychiatric/Behavioral: Positive for agitation and dysphoric mood. Negative for behavioral problems, confusion, decreased concentration, hallucinations, sleep disturbance and suicidal ideas. The patient is nervous/anxious. The patient is not hyperactive. Mental fatigue and feeling physically tired with Prozac, so he tapered himself off of it. All other systems reviewed and are negative. Objective:   Physical Exam  Constitutional:       Appearance: Normal appearance. He is well-developed and normal weight. HENT:      Head: Normocephalic and atraumatic. Right Ear: Tympanic membrane and external ear normal.      Left Ear: Tympanic membrane and external ear normal.      Nose: Nose normal.      Mouth/Throat:      Mouth: Mucous membranes are moist.      Pharynx: Oropharynx is clear. No oropharyngeal exudate. Eyes:      General:         Right eye: No discharge. Left eye: No discharge. Conjunctiva/sclera: Conjunctivae normal.      Pupils: Pupils are equal, round, and reactive to light. Neck:      Thyroid: No thyromegaly. Cardiovascular:      Rate and Rhythm: Normal rate and regular rhythm. Heart sounds: Normal heart sounds. No murmur heard. No friction rub. No gallop. Pulmonary:      Effort: Pulmonary effort is normal. No respiratory distress. Breath sounds: Normal breath sounds. No wheezing or rales. Abdominal:      General: There is no distension. Palpations: Abdomen is soft. There is no mass. Tenderness: There is no abdominal tenderness. Musculoskeletal:         General: Normal range of motion. Cervical back: Normal range of motion and neck supple. Lymphadenopathy:      Cervical: No cervical adenopathy. Skin:     General: Skin is warm and dry. Coloration: Skin is not pale.       Findings: No erythema or rash.   Neurological:      General: No focal deficit present. Mental Status: He is alert and oriented to person, place, and time. Psychiatric:         Mood and Affect: Mood normal.         Behavior: Behavior normal.         Thought Content: Thought content normal.         Judgment: Judgment normal.         Assessment:      1. Acute non intractable tension-type headache    2. Anxiety and depression            Plan:       Drinking only 4 cups of water a day. He needs to drink at least twice that or may be even more. Advised to get his eyes checked out since his been over a year now his prescription might have changed. Also advised to start taking riboflavin 100 mg daily. If Excedrin Migraine and Motrin not helping with the headache he does not need to take Imitrex but if not he can take Imitrex at the onset of the headache and then repeat every 2 hours for a maximum of 4 tablets in 24 hours. Gave written instructions on how to avoid headache triggers. He still has depression and anxiety so decided to go with Celexa. He can even double up on the dose after 2 weeks if needed. No orders of the defined types were placed in this encounter. Orders Placed This Encounter   Medications    SUMAtriptan (IMITREX) 50 MG tablet     Sig: Take one tablet at the onset of the headache and take one every 2 hours for a maximum of 4 tablets in 24 hours. Dispense:  30 tablet     Refill:  1    citalopram (CELEXA) 10 MG tablet     Sig: Take 1 tablet by mouth daily     Dispense:  30 tablet     Refill:  3     Patient Instructions     Maintain a headache diary  Need to note down, when they have the headache, how severe it is on a scale of 1-10, duration and time of the day when it occurs, triggers and relieving factors. Avoid obvious triggers like hunger, dehydrations, eye strain, loud music and lack of sleep  Eat 3 square meals a day  Drink 8 cups of water  Don't watch any screen for too long, Follow 20/20/20 rule. Take a break every 20 minutes, for 20 seconds and look at some thing 20 feet away, like through the window or get up and walk around. At least 8 hours of sleep is required. Avoid loud music  Avoid flashy lights  Avoid strong perfumes and colognes if they bring on the headaches  Avoid drinking too much caffeine as you can have withdrawal headaches. Take mild analgesic like Tylenol at the onset of headache and sleep it off if possible. As the symptoms get worse can try Motrin and Excedrin Migraine  Get eyes checked out by Ophthalmologist  Might consider starting on a prophylactic medication or prescription strength medications the headache increase in Intensity, duration or frequency. Bring the headache diary with you at your next appointment. Patient Education        Tension Headache in Teens: Care Instructions  Overview  Most headaches are tension headaches. Some people get them often, especially if they have a lot of stress in their lives. This kind of headache may cause pain or a feeling of pressure all over your head. Sometimes it's hard to know where the center of the pain is. If you get a lot of these kind of headaches, the best way to reduce them is to find out what's causing them. Then you can make changes in those areas. Follow-up care is a key part of your treatment and safety. Be sure to make and go to all appointments, and call your doctor if you are having problems. It's also a good idea to know your test results and keep a list of the medicines you take. How can you care for yourself at home? · Rest in a quiet, dark room. Put a cool cloth on your forehead. Close your eyes, and try to relax or go to sleep. Do not watch TV, read, or use the computer. · Use a warm, moist towel or a heating pad set on low to relax tight shoulder and neck muscles. · Have someone gently massage your neck and shoulders. · Be safe with medicines. Read and follow all instructions on the label.   ? If the doctor gave you a prescription medicine for pain, take it as prescribed. ? If you are not taking a prescription pain medicine, ask your doctor if you can take an over-the-counter medicine. · Be careful not to take more pain medicine than the instructions say. This is because you may get worse or more frequent headaches when the medicine wears off. · If you get a headache, stop what you are doing and sit quietly for a moment. Close your eyes and breathe slowly. Try to relax your head and neck muscles. · Pay attention to any new symptoms you have when you have a headache. These include a fever, weakness or numbness, vision changes, or confusion. They may be signs of a more serious problem. To help prevent headaches  · Keep a headache diary. This can help you and your doctor figure out what triggers your headaches. If you avoid your triggers, you may be able to prevent headaches. · It's good to include several things in your headache diary. Write down when a headache begins and how long it lasts. Try to describe what the pain was like (throbbing, aching, stabbing, or dull). Then add anything you think may have triggered the headache. This could include stress, anxiety, or depression. It could also include hunger, anger, or fatigue. Sometimes, bad posture and muscle strain are triggers for people. · Find healthy ways to deal with stress. Headaches are most common during or right after stressful times. Take time to relax before and after you do something that caused a headache in the past.  · Get plenty of exercise every day. Go for a walk or jog, ride a bike, or find other ways to be active. This can help with stress and muscle tension. · Get regular sleep. · Eat regularly and well. If you wait too long to eat, it can trigger a headache. · If you have the time and money, you may want to try massage. Some people find that regular massages really help relieve tension.   · Try to use good posture and keep the muscles of your jaw, face, neck, and shoulders relaxed. If you sit at a desk, change positions often. Try to stretch for 30 seconds every hour. · If you use a computer a lot, you can do things to make your eyes less tired. Try blinking more and sometimes looking away from the screen. Be sure to use glasses or contacts if you need them. And check that your monitor is about an arm's distance away from you. When should you call for help? Call your doctor now or seek immediate medical care if:    · You have a fever with a stiff neck or a severe headache.     · Light hurts your eye.     · You have new or worse nausea or vomiting. Watch closely for changes in your health, and be sure to contact your doctor if:    · Your headache has not gotten better in 1 or 2 days.     · Your headaches get worse or happen more often. Where can you learn more? Go to https://DotBlu.[employer]. org and sign in to your [employer] account. Enter H660 in the Tapjoy box to learn more about \"Tension Headache in Teens: Care Instructions. \"     If you do not have an account, please click on the \"Sign Up Now\" link. Current as of: April 8, 2021               Content Version: 13.0  © 2997-7949 Healthwise, Incorporated. Care instructions adapted under license by [facility] ([facility]). If you have questions about a medical condition or this instruction, always ask your healthcare professional. [place] [address] any warranty or liability for your use of this information.                    Belem Carrillo MA Vivian Ortiz

## 2023-05-28 NOTE — CONSULT NOTE ADULT - CONVERSATION DETAILS
Referral for complex decision making and symptom management in setting of advanced malignancy c/b subdural hematoma. Discussed with patient's family, Beckie (niece), Belkis (niece) and attempted to speak to daughter Angie (but she did not  when we called using : [id number]). Reviewed patient's clinical course with patient's nieces Beckie and Belkis. Family is in communication with patient's spouse in [facility] who is aware of patient's current clinical condition and are all in agreement with compassionate extubation tonight and transition to comfort care. Discussed PCU transition when bed is available but conditional if patient is stable after late night extubation and once cleared by [facility] [place].    ARMEN reviewed DNR, plan for compassionate extubation tonight.

## 2023-05-28 NOTE — PROGRESS NOTE ADULT - SUBJECTIVE AND OBJECTIVE BOX
HPI: 73 year old man w/ PMH of Myelodysplastic Syndrome with significant pancytopenia, HTN, HLD, DM, presents w/ acute onset right facial droop and right sided weakness. Per EMS patient was at his hematology office about to receive a platelet and red blood cell transfusion for his pancytopenia when he was last seen normal around 920-9 30.  Staff then went back into the room at 953 and it was noted that he had developed acute slurred speech, stuttering, right-sided facial droop, subjective right-sided weakness in his arm and leg.  Otherwise denies any headache, blurry vision, chest pain, shortness of breath, palpitations, abdominal pain, nausea or other complaints at this time.  EMS called in the case as a code stroke and he was rapidly assessed in the ED by primary ER team and neurology.      On admission, the patient was:  GCS: 10T    24H events:  Pt made comfort measures only    VITALS/LABS/IMAGING:    reviewed.    MEDICATIONS:  reviewed    EXAMINATION: on sedation  General:  calm  HEENT:  MMM  Neuro:  eyes closed, does not follow commands, appears comfortable in bed  Cards:  RRR  Respiratory:  intubated  Adomen:  soft  Extremities:  no edema  Skin:  warm/dry

## 2023-05-28 NOTE — CONSULT NOTE ADULT - PROBLEM SELECTOR RECOMMENDATION 8
Thank you for allowing us to participate in your patient's care. We will continue to follow with you. Please page [phone] for any q's or c's. The Geriatric and Palliative Medicine service has coverage 24 hours a day/ 7 days a week to provide medical recommendations regarding symptom management needs via telephone.    Ayana Lopez D.O.   Palliative Medicine

## 2023-05-28 NOTE — CONSULT NOTE ADULT - PROBLEM SELECTOR RECOMMENDATION 3
IV dilaudid gtt@ 1mg/hr   IV dilaudid 1mg q2hr prn   Bowel regimen while on opioids- dulcolax suppository prn

## 2023-05-29 NOTE — DISCHARGE NOTE FOR THE EXPIRED PATIENT - HOSPITAL COURSE
Patient is a 73 year old man with PMH of Myelodysplastic Syndrome with pancytopenia, HTN, HLD, DM, who presented to [facility] ED on 5/27 with acute onset right facial droop and right sided weakness. Patient was at his hematology office about to receive a platelet and red blood cell transfusion for his pancytopenia when he was last seen normal around 2181-4138.  Staff then went back into the room at 953 and it was noted that he had developed acute slurred speech, stuttering, right-sided facial droop, subjective right-sided weakness in his arm and leg.  While in ED code stroke was called.  CT Head with Left holohemispheric convexity subdural hemorrhage with mass effect causing 0.6 cm rightward midline shift.  Focal area with layering blood products is seen, which could represent site of active bleeding.  Patient was intubated and transferred to neurosurgery ICU.  No neurosurgical intervention performed, after discussion with family patient was placed on comfort care with eventual decision by proxy for extubation.

## 2023-05-29 NOTE — AIRWAY REMOVAL NOTE  ADULT & PEDS - ARTIFICAL AIRWAY REMOVAL COMMENTS
Written order for extubation verified. The patient was identified by full name and birth date compared to the identification band. Present during the procedure was DESTINY Hernandez.

## 2023-05-30 ENCOUNTER — APPOINTMENT (OUTPATIENT)
Dept: HEMATOLOGY ONCOLOGY | Facility: CLINIC | Age: 73
End: 2023-05-30

## 2023-06-01 LAB
CULTURE RESULTS: SIGNIFICANT CHANGE UP
CULTURE RESULTS: SIGNIFICANT CHANGE UP
SPECIMEN SOURCE: SIGNIFICANT CHANGE UP
SPECIMEN SOURCE: SIGNIFICANT CHANGE UP

## 2023-06-02 ENCOUNTER — APPOINTMENT (OUTPATIENT)
Dept: HEMATOLOGY ONCOLOGY | Facility: CLINIC | Age: 73
End: 2023-06-02

## 2023-06-05 ENCOUNTER — APPOINTMENT (OUTPATIENT)
Dept: INFUSION THERAPY | Facility: HOSPITAL | Age: 73
End: 2023-06-05

## 2023-06-05 ENCOUNTER — APPOINTMENT (OUTPATIENT)
Dept: HEMATOLOGY ONCOLOGY | Facility: CLINIC | Age: 73
End: 2023-06-05

## 2023-06-06 ENCOUNTER — APPOINTMENT (OUTPATIENT)
Dept: INFUSION THERAPY | Facility: HOSPITAL | Age: 73
End: 2023-06-06

## 2023-06-07 ENCOUNTER — APPOINTMENT (OUTPATIENT)
Dept: INFUSION THERAPY | Facility: HOSPITAL | Age: 73
End: 2023-06-07

## 2023-06-08 ENCOUNTER — APPOINTMENT (OUTPATIENT)
Dept: HEMATOLOGY ONCOLOGY | Facility: CLINIC | Age: 73
End: 2023-06-08

## 2023-06-08 ENCOUNTER — APPOINTMENT (OUTPATIENT)
Dept: INFUSION THERAPY | Facility: HOSPITAL | Age: 73
End: 2023-06-08

## 2023-06-09 ENCOUNTER — APPOINTMENT (OUTPATIENT)
Dept: INFUSION THERAPY | Facility: HOSPITAL | Age: 73
End: 2023-06-09

## 2023-06-16 ENCOUNTER — APPOINTMENT (OUTPATIENT)
Dept: HEMATOLOGY ONCOLOGY | Facility: CLINIC | Age: 73
End: 2023-06-16

## 2023-07-03 ENCOUNTER — APPOINTMENT (OUTPATIENT)
Dept: INFUSION THERAPY | Facility: HOSPITAL | Age: 73
End: 2023-07-03

## 2023-07-03 ENCOUNTER — APPOINTMENT (OUTPATIENT)
Dept: HEMATOLOGY ONCOLOGY | Facility: CLINIC | Age: 73
End: 2023-07-03

## 2023-07-05 ENCOUNTER — APPOINTMENT (OUTPATIENT)
Dept: INFUSION THERAPY | Facility: HOSPITAL | Age: 73
End: 2023-07-05

## 2023-07-06 ENCOUNTER — APPOINTMENT (OUTPATIENT)
Dept: HEMATOLOGY ONCOLOGY | Facility: CLINIC | Age: 73
End: 2023-07-06

## 2023-07-06 ENCOUNTER — APPOINTMENT (OUTPATIENT)
Dept: INFUSION THERAPY | Facility: HOSPITAL | Age: 73
End: 2023-07-06

## 2023-07-07 ENCOUNTER — APPOINTMENT (OUTPATIENT)
Dept: INFUSION THERAPY | Facility: HOSPITAL | Age: 73
End: 2023-07-07

## 2023-07-08 ENCOUNTER — APPOINTMENT (OUTPATIENT)
Dept: INFUSION THERAPY | Facility: HOSPITAL | Age: 73
End: 2023-07-08

## 2023-08-03 NOTE — PATIENT PROFILE ADULT - NSPROPTRIGHTCAREGIVER_GEN_A_NUR
yes Cephalexin Pregnancy And Lactation Text: This medication is Pregnancy Category B and considered safe during pregnancy.  It is also excreted in breast milk but can be used safely for shorter doses.

## 2024-02-07 NOTE — STROKE CODE NOTE - ACTIVATED STROKE TEAM
Detail Level: Zone
Quality 110: Preventive Care And Screening: Influenza Immunization: Influenza immunization was not ordered or administered, reason not given
Yes

## 2024-06-03 NOTE — SBIRT NOTE ADULT - NSSBIRTALCACTION/INTER_GEN_A_CORE
Positive reinforcement Spoke to Donte. VO given for SN & OT to evaluate the patient. I also advised that the patient has a virtual visit scheduled with Dr. Guevara today @ 4:15. He can attempt to look at her heal wound and surgical incision during that visit.

## 2024-10-07 NOTE — ED PROVIDER NOTE - OBJECTIVE STATEMENT
----- Message from Javid Najera sent at 10/7/2024 10:51 AM EDT -----  Please let him know that the monitor does demonstrate about a 2% burden of A-fib.  Most the time he is in a normal rhythm just continue his metoprolol and Eliquis for now but let me know if his palpitations frequency severity or duration worsen over time we could at that point consider an antiarrhythmic medication  
----- Message from Javid Najera sent at 10/7/2024 10:51 AM EDT -----  Please let him know that the monitor does demonstrate about a 2% burden of A-fib.  Most the time he is in a normal rhythm just continue his metoprolol and Eliquis for now but let me know if his palpitations frequency severity or duration worsen over time we could at that point consider an antiarrhythmic medication  
Called pt and left a VM for a return call to [phone].    
Result Communication    Resulted Orders   Holter or Event Cardiac Monitor   Result Value Ref Range    BSA 2.62 m2    Narrative    Refer to scanned images         1:39 PM    Patient returned call to nursing office; patient verified by two identifiers. Provided patient result and plan of care per Dr. Najera notation and patient verbalized understanding.   
73 year old man w/ PMH of Myelodysplastic Syndrome with significant pancytopenia, HTN, HLD, DM, presents w/ acute onset right facial droop and right sided weakness. Per EMS patient was at his hematology office about to receive a platelet and red blood cell transfusion for his pancytopenia when he was last seen normal around 920-9 30.  Staff then went back into the room at 953 and it was noted that he had developed acute slurred speech, stuttering, right-sided facial droop, subjective right-sided weakness in his arm and leg.  Otherwise denies any headache, blurry vision, chest pain, shortness of breath, palpitations, abdominal pain, nausea or other complaints at this time.  EMS called in the case as a code stroke and he was rapidly assessed in the ED by primary ER team and neurology.

## 2025-07-01 NOTE — PROGRESS NOTE ADULT - NS ATTEND AMEND GEN_ALL_CORE FT
Pt reports that roxinal has helped generalized discomfort though he is still a little sore.    .  Primary: Damon    Vital Signs Last 24 Hrs  T(C): 36.9 (21 Dec 2022 05:10), Max: 37.3 (20 Dec 2022 16:52)  T(F): 98.4 (21 Dec 2022 05:10), Max: 99.1 (20 Dec 2022 16:52)  HR: 98 (21 Dec 2022 05:10) (85 - 98)  BP: 110/64 (21 Dec 2022 05:10) (110/64 - 129/77)  BP(mean): --  RR: 18 (21 Dec 2022 05:10) (18 - 18)  SpO2: 100% (21 Dec 2022 05:10) (98% - 100%)    Parameters below as of 21 Dec 2022 05:10  Patient On (Oxygen Delivery Method): room air    MEDICATIONS  (STANDING):  allopurinol 300 milliGRAM(s) Oral daily  azaCITIDine Injectable (eMAR) 49.33 milliGRAM(s) SubCutaneous every 24 hours  azaCITIDine Injectable (eMAR) 49.33 milliGRAM(s) SubCutaneous every 24 hours  azaCITIDine Injectable (eMAR) 49.33 milliGRAM(s) SubCutaneous every 24 hours  Biotene Dry Mouth Oral Rinse 15 milliLiter(s) Swish and Spit three times a day  chlorhexidine 2% Cloths 1 Application(s) Topical daily  dextrose 5%. 1000 milliLiter(s) (100 mL/Hr) IV Continuous <Continuous>  dextrose 5%. 1000 milliLiter(s) (50 mL/Hr) IV Continuous <Continuous>  dextrose 50% Injectable 25 Gram(s) IV Push once  dextrose 50% Injectable 12.5 Gram(s) IV Push once  dextrose 50% Injectable 25 Gram(s) IV Push once  enalapril 20 milliGRAM(s) Oral daily  FIRST- Mouthwash  BLM 30 milliLiter(s) Swish and Spit four times a day  glucagon  Injectable 1 milliGRAM(s) IntraMuscular once  insulin lispro (ADMELOG) corrective regimen sliding scale   SubCutaneous three times a day before meals  insulin lispro (ADMELOG) corrective regimen sliding scale   SubCutaneous at bedtime  ondansetron Injectable 8 milliGRAM(s) IV Push every 24 hours  sodium chloride 0.9% lock flush 3 milliLiter(s) IV Push every 8 hours  sodium chloride 0.9%. 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    PMH: DM, HTN, HLD    Plan:  Heme: PLT goal > 10,000; Please tx one unit of PRBC given expected aplasia and upcoming holiday weekend.   azacitidine 75 mg/m2 x 7 days.   venetoclax 400 mg daily   alllopurinol     ID: does not require primary prophylaxis at this time. When neutropenic: levaquin, posa, valtrex.     Nutrition: tolerating PO    DVT prophylaxis: hold LWHx secondary low PLT count.     Over 35 minutes were spent in direct patient care and care coordination.  Discharge today; follow-up with Catrachito tomorrow at noon. .  Primary: Damon    Vital Signs Last 24 Hrs  T(C): 36.9 (21 Dec 2022 05:10), Max: 37.3 (20 Dec 2022 16:52)  T(F): 98.4 (21 Dec 2022 05:10), Max: 99.1 (20 Dec 2022 16:52)  HR: 98 (21 Dec 2022 05:10) (85 - 98)  BP: 110/64 (21 Dec 2022 05:10) (110/64 - 129/77)  BP(mean): --  RR: 18 (21 Dec 2022 05:10) (18 - 18)  SpO2: 100% (21 Dec 2022 05:10) (98% - 100%)    Parameters below as of 21 Dec 2022 05:10  Patient On (Oxygen Delivery Method): room air    MEDICATIONS  (STANDING):  allopurinol 300 milliGRAM(s) Oral daily  azaCITIDine Injectable (eMAR) 49.33 milliGRAM(s) SubCutaneous every 24 hours  azaCITIDine Injectable (eMAR) 49.33 milliGRAM(s) SubCutaneous every 24 hours  azaCITIDine Injectable (eMAR) 49.33 milliGRAM(s) SubCutaneous every 24 hours  Biotene Dry Mouth Oral Rinse 15 milliLiter(s) Swish and Spit three times a day  chlorhexidine 2% Cloths 1 Application(s) Topical daily  dextrose 5%. 1000 milliLiter(s) (100 mL/Hr) IV Continuous <Continuous>  dextrose 5%. 1000 milliLiter(s) (50 mL/Hr) IV Continuous <Continuous>  dextrose 50% Injectable 25 Gram(s) IV Push once  dextrose 50% Injectable 12.5 Gram(s) IV Push once  dextrose 50% Injectable 25 Gram(s) IV Push once  enalapril 20 milliGRAM(s) Oral daily  FIRST- Mouthwash  BLM 30 milliLiter(s) Swish and Spit four times a day  glucagon  Injectable 1 milliGRAM(s) IntraMuscular once  insulin lispro (ADMELOG) corrective regimen sliding scale   SubCutaneous three times a day before meals  insulin lispro (ADMELOG) corrective regimen sliding scale   SubCutaneous at bedtime  ondansetron Injectable 8 milliGRAM(s) IV Push every 24 hours  sodium chloride 0.9% lock flush 3 milliLiter(s) IV Push every 8 hours  sodium chloride 0.9%. 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    PMH: DM, HTN, HLD    Plan:  Heme: PLT goal > 10,000; Please tx one unit of PRBC given expected aplasia and upcoming holiday weekend.   azacitidine 75 mg/m2 x 7 days.   venetoclax 400 mg daily   alllopurinol     ID: does not require primary prophylaxis at this time. When neutropenic: levaquin, posa, valtrex.     Nutrition: tolerating PO    DVT prophylaxis: hold LWHx secondary low PLT count.     Over 30 minutes were spent in discharge planning today .  Discharge today; follow-up with Catrachito (myself)  tomorrow at noon.